# Patient Record
Sex: FEMALE | Race: WHITE | Employment: OTHER | URBAN - METROPOLITAN AREA
[De-identification: names, ages, dates, MRNs, and addresses within clinical notes are randomized per-mention and may not be internally consistent; named-entity substitution may affect disease eponyms.]

---

## 2017-01-22 PROBLEM — M79.672 PAIN IN LEFT FOOT: Status: ACTIVE | Noted: 2017-01-22

## 2018-02-17 ENCOUNTER — OFFICE VISIT (OUTPATIENT)
Dept: INTERNAL MEDICINE CLINIC | Facility: CLINIC | Age: 63
End: 2018-02-17

## 2018-02-17 ENCOUNTER — HOSPITAL ENCOUNTER (OUTPATIENT)
Dept: GENERAL RADIOLOGY | Age: 63
Discharge: HOME | End: 2018-02-17
Attending: INTERNAL MEDICINE | Admitting: INTERNAL MEDICINE
Payer: MEDICARE

## 2018-02-17 ENCOUNTER — HOSPITAL ENCOUNTER (OUTPATIENT)
Dept: GENERAL RADIOLOGY | Age: 63
Discharge: HOME | End: 2018-02-17
Attending: INTERNAL MEDICINE
Payer: MEDICARE

## 2018-02-17 VITALS
BODY MASS INDEX: 35 KG/M2 | WEIGHT: 200 LBS | SYSTOLIC BLOOD PRESSURE: 125 MMHG | RESPIRATION RATE: 18 BRPM | DIASTOLIC BLOOD PRESSURE: 86 MMHG | HEART RATE: 69 BPM

## 2018-02-17 DIAGNOSIS — G25.0 TREMOR, ESSENTIAL: ICD-10-CM

## 2018-02-17 DIAGNOSIS — N64.4 MASTODYNIA OF LEFT BREAST: ICD-10-CM

## 2018-02-17 DIAGNOSIS — M79.645 THUMB PAIN, LEFT: ICD-10-CM

## 2018-02-17 DIAGNOSIS — R05.9 COUGH: ICD-10-CM

## 2018-02-17 DIAGNOSIS — R53.83 OTHER FATIGUE: ICD-10-CM

## 2018-02-17 DIAGNOSIS — N63.0 BREAST NODULE: ICD-10-CM

## 2018-02-17 DIAGNOSIS — R05.9 COUGH: Primary | ICD-10-CM

## 2018-02-17 PROBLEM — F33.41 RECURRENT MAJOR DEPRESSIVE DISORDER, IN PARTIAL REMISSION (HCC): Status: ACTIVE | Noted: 2018-02-17

## 2018-02-17 PROBLEM — M79.672 PAIN IN LEFT FOOT: Status: RESOLVED | Noted: 2017-01-22 | Resolved: 2018-02-17

## 2018-02-17 PROBLEM — G44.229 CHRONIC TENSION-TYPE HEADACHE, NOT INTRACTABLE: Status: ACTIVE | Noted: 2018-02-17

## 2018-02-17 PROBLEM — F33.41 RECURRENT MAJOR DEPRESSIVE DISORDER, IN PARTIAL REMISSION: Status: ACTIVE | Noted: 2018-02-17

## 2018-02-17 PROCEDURE — 99204 OFFICE O/P NEW MOD 45 MIN: CPT | Performed by: INTERNAL MEDICINE

## 2018-02-17 PROCEDURE — 73140 X-RAY EXAM OF FINGER(S): CPT | Performed by: INTERNAL MEDICINE

## 2018-02-17 PROCEDURE — 71046 X-RAY EXAM CHEST 2 VIEWS: CPT | Performed by: INTERNAL MEDICINE

## 2018-02-17 PROCEDURE — G0463 HOSPITAL OUTPT CLINIC VISIT: HCPCS | Performed by: INTERNAL MEDICINE

## 2018-02-17 RX ORDER — IBUPROFEN 200 MG
200 TABLET ORAL EVERY 6 HOURS PRN
COMMUNITY

## 2018-02-17 RX ORDER — CYCLOBENZAPRINE HCL 10 MG
10 TABLET ORAL 3 TIMES DAILY PRN
Qty: 90 TABLET | Status: SHIPPED | OUTPATIENT
Start: 2018-02-17 | End: 2021-01-25

## 2018-02-17 NOTE — PROGRESS NOTES
HPI:    Patient ID: Kristi Santillan is a 65year old female presents to address several concerns.     HPI  Patient reports that last 4 months she has been coughing, occasionally phlegmonous productive with clear sputum, cough can present as a coughing spells sores, sore throat, tinnitus, trouble swallowing and voice change. Psychiatric/Behavioral: Negative for sleep disturbance and depressed mood. The patient is not nervous/anxious.          Current Outpatient Prescriptions:  ibuprofen 200 MG Oral Tab Take 2 table). No cranial nerve deficit or motor deficit.  Gait normal.              ASSESSMENT/PLAN:   Cough  (primary encounter diagnosis) etiology not clear, will get chest x-ray, possible postnasal drainage due to allergies, advised patient to start Xyzal 5 mg

## 2018-02-19 ENCOUNTER — TELEPHONE (OUTPATIENT)
Dept: INTERNAL MEDICINE CLINIC | Facility: CLINIC | Age: 63
End: 2018-02-19

## 2018-02-19 NOTE — TELEPHONE ENCOUNTER
Per Kaci schafer is there and they would like a copy of her blood request faxed to them at [phone].   Per Casimiro Norris will fax request.

## 2018-02-20 ENCOUNTER — HOSPITAL ENCOUNTER (OUTPATIENT)
Dept: MAMMOGRAPHY | Facility: HOSPITAL | Age: 63
Discharge: HOME | End: 2018-02-20
Attending: INTERNAL MEDICINE
Payer: MEDICARE

## 2018-02-20 ENCOUNTER — HOSPITAL ENCOUNTER (OUTPATIENT)
Dept: ULTRASOUND IMAGING | Facility: HOSPITAL | Age: 63
Discharge: HOME | End: 2018-02-20
Attending: INTERNAL MEDICINE
Payer: MEDICARE

## 2018-02-20 DIAGNOSIS — N64.4 MASTODYNIA OF LEFT BREAST: ICD-10-CM

## 2018-02-20 DIAGNOSIS — N63.0 BREAST NODULE: ICD-10-CM

## 2018-02-20 LAB
ABSOLUTE BASOPHILS: 41 CELLS/UL (ref 0–200)
ABSOLUTE EOSINOPHILS: 180 CELLS/UL (ref 15–500)
ABSOLUTE LYMPHOCYTES: 2593 CELLS/UL (ref 850–3900)
ABSOLUTE MONOCYTES: 499 CELLS/UL (ref 200–950)
ABSOLUTE NEUTROPHILS: 2488 CELLS/UL (ref 1500–7800)
ALBUMIN/GLOBULIN RATIO: 1.6 (CALC) (ref 1–2.5)
ALBUMIN: 4.3 G/DL (ref 3.6–5.1)
ALKALINE PHOSPHATASE: 60 U/L (ref 33–130)
ALT: 22 U/L (ref 6–29)
AST: 19 U/L (ref 10–35)
BASOPHILS: 0.7 %
BILIRUBIN, TOTAL: 0.5 MG/DL (ref 0.2–1.2)
BUN: 17 MG/DL (ref 7–25)
CALCIUM: 9.8 MG/DL (ref 8.6–10.4)
CARBON DIOXIDE: 29 MMOL/L (ref 20–31)
CHLORIDE: 104 MMOL/L (ref 98–110)
CREATININE: 0.85 MG/DL (ref 0.5–0.99)
EGFR IF AFRICN AM: 86 ML/MIN/1.73M2
EGFR IF NONAFRICN AM: 74 ML/MIN/1.73M2
EOSINOPHILS: 3.1 %
GLOBULIN: 2.7 G/DL (CALC) (ref 1.9–3.7)
GLUCOSE: 88 MG/DL (ref 65–99)
HEMATOCRIT: 40.2 % (ref 35–45)
HEMOGLOBIN: 13.3 G/DL (ref 11.7–15.5)
LYMPHOCYTES: 44.7 %
MCH: 28.3 PG (ref 27–33)
MCHC: 33.1 G/DL (ref 32–36)
MCV: 85.5 FL (ref 80–100)
MONOCYTES: 8.6 %
MPV: 10.2 FL (ref 7.5–12.5)
NEUTROPHILS: 42.9 %
PLATELET COUNT: 310 THOUSAND/UL (ref 140–400)
POTASSIUM: 4.8 MMOL/L (ref 3.5–5.3)
PROTEIN, TOTAL: 7 G/DL (ref 6.1–8.1)
RDW: 12.5 % (ref 11–15)
RED BLOOD CELL COUNT: 4.7 MILLION/UL (ref 3.8–5.1)
SODIUM: 140 MMOL/L (ref 135–146)
TSH W/REFLEX TO FT4: 2.29 MIU/L (ref 0.4–4.5)
WHITE BLOOD CELL COUNT: 5.8 THOUSAND/UL (ref 3.8–10.8)

## 2018-02-20 PROCEDURE — 77066 DX MAMMO INCL CAD BI: CPT | Performed by: INTERNAL MEDICINE

## 2018-02-20 PROCEDURE — 76642 ULTRASOUND BREAST LIMITED: CPT | Performed by: INTERNAL MEDICINE

## 2018-02-21 ENCOUNTER — TELEPHONE (OUTPATIENT)
Dept: FAMILY MEDICINE CLINIC | Facility: CLINIC | Age: 63
End: 2018-02-21

## 2018-02-21 NOTE — TELEPHONE ENCOUNTER
Notes Recorded by Salvatore Grace MA on 2/21/2018 at 1:24 PM CST  Left detailed vm notifying patient of normal X-rays results.

## 2018-02-21 NOTE — TELEPHONE ENCOUNTER
----- Message from Mckenzie Truong MD sent at 2/18/2018 10:33 PM CST -----  Call patient negative chest x-ray, negative chest x-ray of the finger

## 2018-02-21 NOTE — TELEPHONE ENCOUNTER
Pt called back regarding results and advised of normal for both chest x-ray and x-ray of finger. Pt reports no improvement in her condition, no worse either. She reports continued cough, chest wall pain, and left thumb pain.  No increased, changed, new,

## 2018-03-01 ENCOUNTER — OFFICE VISIT (OUTPATIENT)
Dept: DERMATOLOGY CLINIC | Facility: CLINIC | Age: 63
End: 2018-03-01

## 2018-03-01 DIAGNOSIS — D23.70 BENIGN NEOPLASM OF SKIN OF LOWER LIMB, INCLUDING HIP, UNSPECIFIED LATERALITY: ICD-10-CM

## 2018-03-01 DIAGNOSIS — D23.5 BENIGN NEOPLASM OF SKIN OF TRUNK, EXCEPT SCROTUM: ICD-10-CM

## 2018-03-01 DIAGNOSIS — L81.4 LENTIGO: ICD-10-CM

## 2018-03-01 DIAGNOSIS — L82.1 SEBORRHEIC KERATOSES: Primary | ICD-10-CM

## 2018-03-01 DIAGNOSIS — D23.30 BENIGN NEOPLASM OF SKIN OF FACE: ICD-10-CM

## 2018-03-01 DIAGNOSIS — D23.60 BENIGN NEOPLASM OF SKIN OF UPPER LIMB, INCLUDING SHOULDER, UNSPECIFIED LATERALITY: ICD-10-CM

## 2018-03-01 DIAGNOSIS — D23.4 BENIGN NEOPLASM OF SCALP AND SKIN OF NECK: ICD-10-CM

## 2018-03-01 PROCEDURE — G0463 HOSPITAL OUTPT CLINIC VISIT: HCPCS | Performed by: DERMATOLOGY

## 2018-03-01 PROCEDURE — 99202 OFFICE O/P NEW SF 15 MIN: CPT | Performed by: DERMATOLOGY

## 2018-03-12 NOTE — PROGRESS NOTES
Matt Vera is a 65year old female. HPI:     CC:  Patient presents with:  Lesion: New patient presents with lesions to outer labia x 10+ years. Waxes and wanes. More lesions appearing. Aggraved when washing and bleed at times.    Derm Problem: \"Moles\ Comment:Other reaction(s): IODINE  Penicillins               Sulfa Antibiotics         Tylenol [Acetaminop*        Past Medical History:   Diagnosis Date   • Bronchitis    • Colon abnormality     spastic   • Depression    • Tremor    • Ulcer    • VHL (von including scalp, head, neck, face,nails, hair, external eyes, including conjunctival mucosa, eyelids, lips external ears, back, chest,/ breasts, axillae,  abdomen, arms, legs, palms.      Multiple light to medium brown, well marginated, uniformly pigmented, nevi, seborrheic  keratoses, cherry angiomas:  Reassurance regarding other benign skin lesions. Signs and symptoms of skin cancer, ABCDE's of melanoma discussed with patient. Sunscreen use, sun protection, self exams reviewed.   Followup as noted RTC routine

## 2019-01-22 ENCOUNTER — NURSE TRIAGE (OUTPATIENT)
Dept: OTHER | Age: 64
End: 2019-01-22

## 2019-01-22 ENCOUNTER — OFFICE VISIT (OUTPATIENT)
Dept: INTERNAL MEDICINE CLINIC | Facility: CLINIC | Age: 64
End: 2019-01-22
Payer: MEDICARE

## 2019-01-22 VITALS
SYSTOLIC BLOOD PRESSURE: 129 MMHG | BODY MASS INDEX: 34 KG/M2 | HEART RATE: 103 BPM | DIASTOLIC BLOOD PRESSURE: 81 MMHG | TEMPERATURE: 99 F | OXYGEN SATURATION: 96 % | WEIGHT: 196 LBS | RESPIRATION RATE: 25 BRPM

## 2019-01-22 DIAGNOSIS — J40 BRONCHITIS: Primary | ICD-10-CM

## 2019-01-22 PROCEDURE — 99214 OFFICE O/P EST MOD 30 MIN: CPT | Performed by: INTERNAL MEDICINE

## 2019-01-22 PROCEDURE — G0463 HOSPITAL OUTPT CLINIC VISIT: HCPCS | Performed by: INTERNAL MEDICINE

## 2019-01-22 RX ORDER — DOXYCYCLINE HYCLATE 100 MG
100 TABLET ORAL 2 TIMES DAILY
Qty: 20 TABLET | Refills: 0 | Status: SHIPPED | OUTPATIENT
Start: 2019-01-22 | End: 2019-11-08

## 2019-01-22 RX ORDER — PROPRANOLOL HYDROCHLORIDE 10 MG/1
10 TABLET ORAL 3 TIMES DAILY
Qty: 90 TABLET | Refills: 1 | Status: SHIPPED | OUTPATIENT
Start: 2019-01-22 | End: 2019-10-16

## 2019-01-22 NOTE — TELEPHONE ENCOUNTER
Action Requested: Summary for Provider     []  Critical Lab, Recommendations Needed  [] Need Additional Advice  []   FYI    []   Need Orders  [] Need Medications Sent to Pharmacy  []  Other     SUMMARY: pt c/o cough x 2 weeks. Sputum clear.  Associate with

## 2019-01-23 NOTE — PROGRESS NOTES
HPI:    Patient ID: Ally Waldrop is a 59year old female.   Presents for evaluation of the cough    HPI  Patient reports that she started having cough and chest congestion about 2 weeks ago, initially had nasal congestion and postnasal drainage, now every cetirizine 10 MG Oral Tab Take 10 mg by mouth daily.  Disp:  Rfl:      Allergies:  Erythromycin            ANGIOEDEMA  Acetaminophen               Comment:Other reaction(s): ACETAMINOPHEN  Adhesive Tape             Gentamicin                  Comment:Othe Oral Tab 20 tablet 0     Sig: Take 1 tablet (100 mg total) by mouth 2 (two) times daily. • Propranolol HCl 10 MG Oral Tab 90 tablet 1     Sig: Take 1 tablet (10 mg total) by mouth 3 (three) times daily.        Imaging & Referrals:  None         [id number]

## 2019-10-15 NOTE — TELEPHONE ENCOUNTER
Patient is requesting a refill of medication Propranolol HCl 10 MG Oral Tab.  Patient states she contacted [facility] regarding her refill request and Pharmacy advised they sent refill request to Neurologist at [facility] when patient states request renetta

## 2019-10-16 RX ORDER — PROPRANOLOL HYDROCHLORIDE 10 MG/1
10 TABLET ORAL 3 TIMES DAILY
Qty: 90 TABLET | Refills: 0 | Status: SHIPPED | OUTPATIENT
Start: 2019-10-16 | End: 2019-11-12

## 2019-10-16 NOTE — TELEPHONE ENCOUNTER
Please advise. rx failed protocol.  Pt need appt and labs due  Hypertensive Medications  Protocol Criteria:  · Appointment scheduled in the past 6 months or in the next 3 months  · BMP or CMP in the past 12 months  · Creatinine result < 2  Recent Outpatient

## 2019-11-08 ENCOUNTER — TELEPHONE (OUTPATIENT)
Dept: INTERNAL MEDICINE CLINIC | Facility: CLINIC | Age: 64
End: 2019-11-08

## 2019-11-08 ENCOUNTER — HOSPITAL ENCOUNTER (OUTPATIENT)
Dept: GENERAL RADIOLOGY | Age: 64
Discharge: HOME | End: 2019-11-08
Attending: INTERNAL MEDICINE
Payer: MEDICARE

## 2019-11-08 ENCOUNTER — NURSE TRIAGE (OUTPATIENT)
Dept: OTHER | Age: 64
End: 2019-11-08

## 2019-11-08 ENCOUNTER — OFFICE VISIT (OUTPATIENT)
Dept: INTERNAL MEDICINE CLINIC | Facility: CLINIC | Age: 64
End: 2019-11-08
Payer: MEDICARE

## 2019-11-08 VITALS
HEART RATE: 103 BPM | BODY MASS INDEX: 36.05 KG/M2 | SYSTOLIC BLOOD PRESSURE: 118 MMHG | HEIGHT: 63.5 IN | DIASTOLIC BLOOD PRESSURE: 82 MMHG | WEIGHT: 206 LBS | RESPIRATION RATE: 25 BRPM

## 2019-11-08 DIAGNOSIS — M25.552 PAIN OF LEFT HIP JOINT: Primary | ICD-10-CM

## 2019-11-08 DIAGNOSIS — M25.552 PAIN OF LEFT HIP JOINT: ICD-10-CM

## 2019-11-08 DIAGNOSIS — G25.0 TREMOR, ESSENTIAL: ICD-10-CM

## 2019-11-08 PROCEDURE — 73502 X-RAY EXAM HIP UNI 2-3 VIEWS: CPT | Performed by: INTERNAL MEDICINE

## 2019-11-08 PROCEDURE — G0463 HOSPITAL OUTPT CLINIC VISIT: HCPCS | Performed by: INTERNAL MEDICINE

## 2019-11-08 PROCEDURE — 99214 OFFICE O/P EST MOD 30 MIN: CPT | Performed by: INTERNAL MEDICINE

## 2019-11-08 NOTE — TELEPHONE ENCOUNTER
Action Requested: Summary for Provider     []  Critical Lab, Recommendations Needed  [] Need Additional Advice  [x]   FYI    []   Need Orders  [] Need Medications Sent to Pharmacy  []  Other     SUMMARY: Patient calling with complaint of falling today and

## 2019-11-09 NOTE — PROGRESS NOTES
HPI:    Patient ID: Ally Waldrop is a 62year old female.   Presents for evaluation of the injuries    HPI  Patient reports that today when she was walking out of the pool she usually walks with a small steps because of Parkinson's disease, today decided 10 MG Oral Tab Take 1 tablet (10 mg total) by mouth 3 (three) times daily as needed for Muscle spasms. 90 tablet q   • Calcium Carbonate Antacid 500 MG Oral Chew Tab Chew 1 tablet by mouth daily. • cetirizine 10 MG Oral Tab Take 10 mg by mouth daily. bedtime careful walking and driving, if symptoms persist will need MRI of the hip orthopedic eval etc.  Tremor continue propranolol per patient medication is very helpful,  Involuntary movements, advised patient to revisit with neurology to be reevaluated

## 2019-11-14 RX ORDER — PROPRANOLOL HYDROCHLORIDE 10 MG/1
TABLET ORAL
Qty: 90 TABLET | Refills: 2 | Status: SHIPPED | OUTPATIENT
Start: 2019-11-14 | End: 2020-02-20

## 2019-11-14 NOTE — TELEPHONE ENCOUNTER
Please review; protocol failed. Overdue for labs    Recent Visits  Date Type Provider Dept   11/08/19 Office Visit Jamshid Wells MD [facility]   01/22/19 Office Visit Jamshid Wells MD [facility]   Showing recent visits within past 540 days with a meds authorizing provider and meeting all other requirements     Future Appointments  No visits were found meeting these conditions.    Showing future appointments within next 150 days with a meds authorizing provider and meeting all other requirements

## 2020-02-20 RX ORDER — PROPRANOLOL HYDROCHLORIDE 10 MG/1
TABLET ORAL
Qty: 270 TABLET | Refills: 1 | Status: SHIPPED | OUTPATIENT
Start: 2020-02-20 | End: 2020-06-24

## 2020-02-20 NOTE — TELEPHONE ENCOUNTER
Requested Prescriptions     Pending Prescriptions Disp Refills   • PROPRANOLOL HCL 10 MG Oral Tab [Pharmacy Med Name: PROPRANOLOL 10 MG TABLET] 270 tablet 1     Sig: TAKE 1 TABLET BY MOUTH THREE TIMES A DAY         Recent Visits  Date Type Provider Dept

## 2020-03-18 ENCOUNTER — TELEPHONE (OUTPATIENT)
Dept: INTERNAL MEDICINE CLINIC | Facility: CLINIC | Age: 65
End: 2020-03-18

## 2020-03-18 NOTE — TELEPHONE ENCOUNTER
Patient states she missed call from Dr. Lupis Terrazas, she said she received her voice mail. Patient states she called insurance and she was told she needed a referral by her primary doctor in order to be covered.

## 2020-03-18 NOTE — TELEPHONE ENCOUNTER
Left message for the patient that she should schedule appointment [phone] [facility] specialist does not matter what clinic.,  If optometrist referral center it should be sufficient

## 2020-03-18 NOTE — TELEPHONE ENCOUNTER
Pt calling as [facility] went to have regular eye exam on 3/11/20 since wears glasses and had noticed a black spot on left eye starting on 3/6/20 (mentions was hit by a volley ball on the face at a health club and black spot started hours later).  [facility] has ha

## 2020-03-25 NOTE — TELEPHONE ENCOUNTER
Spoke to patient 6 days ago, advised her that she should provide new insurance information, and referred her to speak to manage care, clarify need for referral and insurance coverage, placed referral for her to see Dr. Tawanda Winn, she states that her conditio

## 2020-06-18 ENCOUNTER — NURSE TRIAGE (OUTPATIENT)
Dept: INTERNAL MEDICINE CLINIC | Facility: CLINIC | Age: 65
End: 2020-06-18

## 2020-06-18 NOTE — TELEPHONE ENCOUNTER
Action Requested: Summary for Provider     []  Critical Lab, Recommendations Needed  [] Need Additional Advice  [x]   FYI    []   Need Orders  [] Need Medications Sent to Pharmacy  []  Other     SUMMARY: per patient Haylee Armendariz started having problems with speec

## 2020-06-24 RX ORDER — PROPRANOLOL HYDROCHLORIDE 10 MG/1
10 TABLET ORAL 3 TIMES DAILY
Qty: 270 TABLET | Refills: 1 | Status: SHIPPED | OUTPATIENT
Start: 2020-06-24 | End: 2021-02-28

## 2020-06-24 NOTE — TELEPHONE ENCOUNTER
Spoke with pt who states \"I am fine\"  Pt denies any further slurred speech, able to ambulate without difficulty,     Per pt \"I think I was dehydrated and tired.   I drank plenty of fluids and took a nap and then felt fine\"    Denies chest pain, difficul

## 2020-09-11 ENCOUNTER — TELEPHONE (OUTPATIENT)
Dept: CASE MANAGEMENT | Age: 65
End: 2020-09-11

## 2020-09-11 NOTE — TELEPHONE ENCOUNTER
Patient is eligible for a 2020 Medicare Annual Wellness visit. Discussed in detail w/patient. Appt scheduled 10/13/20.

## 2020-12-16 ENCOUNTER — TELEPHONE (OUTPATIENT)
Dept: INTERNAL MEDICINE CLINIC | Facility: CLINIC | Age: 65
End: 2020-12-16

## 2021-01-19 ENCOUNTER — OFFICE VISIT (OUTPATIENT)
Dept: INTERNAL MEDICINE CLINIC | Facility: CLINIC | Age: 66
End: 2021-01-19
Payer: MEDICARE

## 2021-01-19 ENCOUNTER — HOSPITAL ENCOUNTER (OUTPATIENT)
Dept: GENERAL RADIOLOGY | Age: 66
Discharge: HOME | End: 2021-01-19
Attending: INTERNAL MEDICINE
Payer: MEDICARE

## 2021-01-19 ENCOUNTER — NURSE TRIAGE (OUTPATIENT)
Dept: INTERNAL MEDICINE CLINIC | Facility: CLINIC | Age: 66
End: 2021-01-19

## 2021-01-19 VITALS
DIASTOLIC BLOOD PRESSURE: 83 MMHG | HEART RATE: 77 BPM | OXYGEN SATURATION: 97 % | WEIGHT: 200 LBS | HEIGHT: 63.5 IN | SYSTOLIC BLOOD PRESSURE: 128 MMHG | BODY MASS INDEX: 35 KG/M2

## 2021-01-19 DIAGNOSIS — R07.81 RIB PAIN ON LEFT SIDE: ICD-10-CM
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
## 2021-01-19 DIAGNOSIS — R07.81 RIB PAIN ON LEFT SIDE: Primary | ICD-10-CM

## 2021-01-19 DIAGNOSIS — R07.81 RIB PAIN: ICD-10-CM

## 2021-01-19 PROCEDURE — 3008F BODY MASS INDEX DOCD: CPT | Performed by: INTERNAL MEDICINE

## 2021-01-19 PROCEDURE — 3074F SYST BP LT 130 MM HG: CPT | Performed by: INTERNAL MEDICINE

## 2021-01-19 PROCEDURE — 3079F DIAST BP 80-89 MM HG: CPT | Performed by: INTERNAL MEDICINE

## 2021-01-19 PROCEDURE — 71111 X-RAY EXAM RIBS/CHEST4/> VWS: CPT | Performed by: INTERNAL MEDICINE

## 2021-01-19 PROCEDURE — 99213 OFFICE O/P EST LOW 20 MIN: CPT | Performed by: INTERNAL MEDICINE

## 2021-01-19 NOTE — PROGRESS NOTES
HPI:    Patient ID: Rl Hassan is a 60year old female. Patient presents with:  Fall: Stts she was walking her dog this morning and fell on her left side of the body.   Pain is located on her left rib cage, and chest.  Pain is noticed when leaning fo Gastrointestinal: Negative for abdominal pain, bowel incontinence, constipation, diarrhea, nausea and vomiting. Genitourinary: Negative for dysuria and frequency. Musculoskeletal: Negative for back pain, neck pain and neck stiffness.         Left side r Pulmonary/Chest: Effort normal and breath sounds normal. No respiratory distress. She has no decreased breath sounds. She has no wheezes. She has no rhonchi. She has no rales.                Left  Lower  Rib cage tenderness    posterior  Lower Ribs  Tendern Likely is a rib contusion , possible fracture-to evaluate with a chest x-ray first and then we can order further testing if necessary based on her symptoms and x-ray findings.      Patient is going downstairs to have the x-ray completed  She will have jurgen

## 2021-01-19 NOTE — TELEPHONE ENCOUNTER
Action Requested: Summary for Provider     []  Critical Lab, Recommendations Needed  [] Need Additional Advice  []   FYI    []   Need Orders  [] Need Medications Sent to Pharmacy  []  Other     SUMMARY:   Appointment made for today  1/19/21    The patien

## 2021-01-22 ENCOUNTER — TELEPHONE (OUTPATIENT)
Dept: INTERNAL MEDICINE CLINIC | Facility: CLINIC | Age: 66
End: 2021-01-22

## 2021-01-23 NOTE — TELEPHONE ENCOUNTER
To patient, she is in tremendous amount of pain, lifting left arm, leaning forward creates a lot of pain deep in the chest.  Advised that I can place binder order from my chart she is not on my chart, I advised her to create improvised binder at home, can

## 2021-01-23 NOTE — TELEPHONE ENCOUNTER
Called patient. LOV 1/19/21. Patient requested to speak with Dr. Jaziel Jessica. I scheduled patient 1/25/21.  2:40pm.    Patient states there is no improvement. Xray showed no fracture.   Patient feels she has more internal tissue tear (ligament/tendon?)   S

## 2021-01-23 NOTE — TELEPHONE ENCOUNTER
Patient is following up. Patient is requesting a call back today. Patient stated she still in pain. Please advise.

## 2021-01-25 ENCOUNTER — HOSPITAL ENCOUNTER (OUTPATIENT)
Dept: CT IMAGING | Age: 66
Discharge: HOME | End: 2021-01-25
Attending: INTERNAL MEDICINE
Payer: MEDICARE

## 2021-01-25 ENCOUNTER — TELEPHONE (OUTPATIENT)
Dept: INTERNAL MEDICINE CLINIC | Facility: CLINIC | Age: 66
End: 2021-01-25

## 2021-01-25 ENCOUNTER — OFFICE VISIT (OUTPATIENT)
Dept: INTERNAL MEDICINE CLINIC | Facility: CLINIC | Age: 66
End: 2021-01-25
Payer: MEDICARE

## 2021-01-25 VITALS
SYSTOLIC BLOOD PRESSURE: 110 MMHG | DIASTOLIC BLOOD PRESSURE: 77 MMHG | BODY MASS INDEX: 35 KG/M2 | RESPIRATION RATE: 20 BRPM | WEIGHT: 200 LBS | HEART RATE: 78 BPM

## 2021-01-25 DIAGNOSIS — R07.81 PLEURITIC CHEST PAIN: ICD-10-CM

## 2021-01-25 DIAGNOSIS — S20.212S: ICD-10-CM

## 2021-01-25 DIAGNOSIS — R07.81 PLEURITIC CHEST PAIN: Primary | ICD-10-CM

## 2021-01-25 PROCEDURE — 3078F DIAST BP <80 MM HG: CPT | Performed by: INTERNAL MEDICINE

## 2021-01-25 PROCEDURE — 71250 CT THORAX DX C-: CPT | Performed by: INTERNAL MEDICINE

## 2021-01-25 PROCEDURE — 99214 OFFICE O/P EST MOD 30 MIN: CPT | Performed by: INTERNAL MEDICINE

## 2021-01-25 PROCEDURE — 3074F SYST BP LT 130 MM HG: CPT | Performed by: INTERNAL MEDICINE

## 2021-01-25 RX ORDER — IBUPROFEN 600 MG/1
600 TABLET ORAL EVERY 8 HOURS PRN
Qty: 60 TABLET | Refills: 0 | Status: SHIPPED | OUTPATIENT
Start: 2021-01-25

## 2021-01-25 RX ORDER — OMEPRAZOLE 20 MG/1
20 CAPSULE, DELAYED RELEASE ORAL
Qty: 90 CAPSULE | Refills: 0 | Status: SHIPPED | OUTPATIENT
Start: 2021-01-25 | End: 2021-04-18

## 2021-01-25 RX ORDER — CYCLOBENZAPRINE HCL 10 MG
10 TABLET ORAL 3 TIMES DAILY PRN
Qty: 90 TABLET | Refills: 0 | Status: SHIPPED | OUTPATIENT
Start: 2021-01-25

## 2021-01-26 NOTE — PROGRESS NOTES
HPI:    Patient ID: Varun Acosta is a 60year old female.   Presents for follow-up pulmonary pain    HPI  Patient fell 8 days ago walking her dog, develop severe pain in the left lower ribs, when she fell she feels like she elbowed herself in the rib cage ACETAMINOPHEN  Adhesive Tape             Gentamicin                  Comment:Other reaction(s): GENTAMICIN  Iodine (Topical)            Comment:Other reaction(s): IODINE  Penicillins               Sulfa Antibiotics         Tylenol [Acetaminop*        Prescriptions Disp Refills   • ibuprofen 600 MG Oral Tab 60 tablet 0     Sig: Take 1 tablet (600 mg total) by mouth every 8 (eight) hours as needed for Pain.    • omeprazole 20 MG Oral Capsule Delayed Release 90 capsule 0     Sig: Take 1 capsule (20 mg tota

## 2021-02-28 RX ORDER — PROPRANOLOL HYDROCHLORIDE 10 MG/1
TABLET ORAL
Qty: 270 TABLET | Refills: 1 | Status: SHIPPED | OUTPATIENT
Start: 2021-02-28 | End: 2021-08-10

## 2021-03-23 ENCOUNTER — TELEPHONE (OUTPATIENT)
Dept: CASE MANAGEMENT | Age: 66
End: 2021-03-23

## 2021-03-23 NOTE — TELEPHONE ENCOUNTER
Patient is eligible for a 2021 Medicare Annual Wellness visit. Left message to call back [phone].

## 2021-04-18 RX ORDER — OMEPRAZOLE 20 MG/1
CAPSULE, DELAYED RELEASE ORAL
Qty: 90 CAPSULE | Refills: 0 | Status: SHIPPED | OUTPATIENT
Start: 2021-04-18 | End: 2021-07-12

## 2021-04-21 ENCOUNTER — OFFICE VISIT (OUTPATIENT)
Dept: INTERNAL MEDICINE CLINIC | Facility: CLINIC | Age: 66
End: 2021-04-21
Payer: MEDICARE

## 2021-04-21 VITALS
RESPIRATION RATE: 17 BRPM | DIASTOLIC BLOOD PRESSURE: 74 MMHG | WEIGHT: 200 LBS | BODY MASS INDEX: 35 KG/M2 | HEART RATE: 69 BPM | SYSTOLIC BLOOD PRESSURE: 110 MMHG | HEIGHT: 63.5 IN

## 2021-04-21 DIAGNOSIS — Z12.31 BREAST CANCER SCREENING BY MAMMOGRAM: ICD-10-CM

## 2021-04-21 DIAGNOSIS — R25.1 TREMOR OBSERVED ON EXAMINATION: ICD-10-CM

## 2021-04-21 DIAGNOSIS — F33.41 RECURRENT MAJOR DEPRESSIVE DISORDER, IN PARTIAL REMISSION (HCC): ICD-10-CM

## 2021-04-21 DIAGNOSIS — G44.229 CHRONIC TENSION-TYPE HEADACHE, NOT INTRACTABLE: ICD-10-CM

## 2021-04-21 DIAGNOSIS — Z00.00 PHYSICAL EXAM, ANNUAL: Primary | ICD-10-CM

## 2021-04-21 DIAGNOSIS — M17.11 PRIMARY OSTEOARTHRITIS OF RIGHT KNEE: ICD-10-CM

## 2021-04-21 DIAGNOSIS — R27.0 ATAXIA: ICD-10-CM

## 2021-04-21 DIAGNOSIS — Z91.81 AT RISK FOR FALLS: ICD-10-CM

## 2021-04-21 PROCEDURE — G0439 PPPS, SUBSEQ VISIT: HCPCS | Performed by: INTERNAL MEDICINE

## 2021-04-21 PROCEDURE — 99396 PREV VISIT EST AGE 40-64: CPT | Performed by: INTERNAL MEDICINE

## 2021-04-21 PROCEDURE — 3074F SYST BP LT 130 MM HG: CPT | Performed by: INTERNAL MEDICINE

## 2021-04-21 PROCEDURE — 96160 PT-FOCUSED HLTH RISK ASSMT: CPT | Performed by: INTERNAL MEDICINE

## 2021-04-21 PROCEDURE — 3008F BODY MASS INDEX DOCD: CPT | Performed by: INTERNAL MEDICINE

## 2021-04-21 PROCEDURE — 3078F DIAST BP <80 MM HG: CPT | Performed by: INTERNAL MEDICINE

## 2021-04-21 NOTE — PROGRESS NOTES
HPI:   Lord West is a 60year old female who presents for a Medicare Subsequent Annual Wellness visit (Pt already had Initial Annual Wellness). Left rib cage pain is not as severe still did not resolve completely since 2 months ago.   Chronic cough, cognitive assessment- see flowsheet entries    Functional Ability/Status   [address] has some abnormal functions as listed below:  She has difficulties Managing Money/Bills based on screening of functional status.    Managing money/bills: Need some hel requested submit copy of document when it is available     She does NOT have a Power of  for [employer] on file in [address] [place] [address] Rd.    Discussed with patient meaning of power of  for healthcare, provided with educational brochure and forms, requested spasms. ibuprofen 200 MG Oral Tab, Take 200 mg by mouth every 6 (six) hours as needed. Calcium Carbonate Antacid 500 MG Oral Chew Tab, Chew 1 tablet by mouth as needed. cetirizine 10 MG Oral Tab, Take 10 mg by mouth as needed.          MEDICAL INFORMAT calculated from the following:    Height as of this encounter: 5' 3.5\" (1.613 m). Weight as of this encounter: 200 lb (90.7 kg).     Medicare Hearing Assessment  (Required for AWV/SWV)    Hearing Screening    Time taken: 4/21/2021  1:32 PM  Entry User: intact. Conjunctiva/sclera: Conjunctivae normal.      Pupils: Pupils are equal, round, and reactive to light. Cardiovascular:      Rate and Rhythm: Normal rate and regular rhythm. Heart sounds: No murmur heard. No gallop.     Pulmonary:      E neurologist for failure evaluation of the problem  -     NEURO - INTERNAL    At risk for falls, advised patient to use cane or walker on the days when she feels unsteady to prevent  falls  Recurrent major depressive disorder, in partial remission/HCC/patie Fecal Occult Blood Annually No results found for: FOB No flowsheet data found. Glaucoma Screening      Ophthalmology Visit Annually: Diabetics, FHx Glaucoma, AA>50, > 65 No flowsheet data found.     Bone Density Screening      Dexascan Every

## 2021-05-04 ENCOUNTER — HOSPITAL ENCOUNTER (OUTPATIENT)
Dept: MAMMOGRAPHY | Age: 66
Discharge: HOME | End: 2021-05-04
Attending: INTERNAL MEDICINE
Payer: MEDICARE

## 2021-05-04 DIAGNOSIS — Z12.31 BREAST CANCER SCREENING BY MAMMOGRAM: ICD-10-CM

## 2021-05-04 DIAGNOSIS — Z00.00 PHYSICAL EXAM, ANNUAL: ICD-10-CM

## 2021-05-04 PROCEDURE — 77067 SCR MAMMO BI INCL CAD: CPT | Performed by: INTERNAL MEDICINE

## 2021-05-04 PROCEDURE — 77063 BREAST TOMOSYNTHESIS BI: CPT | Performed by: INTERNAL MEDICINE

## 2021-06-02 ENCOUNTER — OFFICE VISIT (OUTPATIENT)
Dept: NEUROLOGY | Facility: CLINIC | Age: 66
End: 2021-06-02
Payer: MEDICARE

## 2021-06-02 VITALS
DIASTOLIC BLOOD PRESSURE: 70 MMHG | RESPIRATION RATE: 16 BRPM | HEART RATE: 76 BPM | SYSTOLIC BLOOD PRESSURE: 112 MMHG | WEIGHT: 208 LBS | BODY MASS INDEX: 36 KG/M2

## 2021-06-02 DIAGNOSIS — R25.2 MUSCLE CRAMPS: ICD-10-CM

## 2021-06-02 DIAGNOSIS — R26.9 GAIT DISORDER: ICD-10-CM

## 2021-06-02 DIAGNOSIS — G25.0 BENIGN ESSENTIAL TREMOR SYNDROME: Primary | ICD-10-CM

## 2021-06-02 PROCEDURE — 3078F DIAST BP <80 MM HG: CPT | Performed by: OTHER

## 2021-06-02 PROCEDURE — 99204 OFFICE O/P NEW MOD 45 MIN: CPT | Performed by: OTHER

## 2021-06-02 PROCEDURE — 3074F SYST BP LT 130 MM HG: CPT | Performed by: OTHER

## 2021-06-02 NOTE — PROGRESS NOTES
HPI:    Patient ID: Capo Keyes is a 73year old female.   PCP: Dr Adelita Quinn    HPI   Abyb Young is a 73year old ambidextrous female with history of benign essential tremors and Lennox Bond Lindau syndrome who presents for evaluation of tremors and gait p Eyes: Negative. Respiratory: Negative. Cardiovascular: Negative. Gastrointestinal: Negative. Endocrine: Negative. Genitourinary: Negative. Musculoskeletal: Positive for gait problem. Skin: Negative. Allergic/Immunologic: Negative. motion. Neck supple. Cardiovascular: Normal rate, regular rhythm and normal heart sounds. Pulmonary/Chest: Effort normal and breath sounds normal.   Abdominal: Soft.  Bowel sounds are normal.   Psych: normal mood and affect    Mental Status Exam: Patien Sinemet but patient politely declined  She would like to continue Propranolol at the current dose 10 mg TID    Obtain all old records. Follow up in about 4-6 weeks        Thank you for allowing us to participate in your patient's care. Please do not hesitat

## 2021-06-02 NOTE — PROGRESS NOTES
Patient states she has had tremors since childhood but have gotten progressively worse over the past 18 years. Is taking Propranolol.

## 2021-06-08 ENCOUNTER — TELEPHONE (OUTPATIENT)
Dept: NEUROLOGY | Facility: CLINIC | Age: 66
End: 2021-06-08

## 2021-06-08 ENCOUNTER — HOSPITAL ENCOUNTER (OUTPATIENT)
Dept: MRI IMAGING | Facility: HOSPITAL | Age: 66
Discharge: HOME | End: 2021-06-08
Attending: Other
Payer: MEDICARE

## 2021-06-08 DIAGNOSIS — R26.9 GAIT DISORDER: ICD-10-CM

## 2021-06-08 DIAGNOSIS — G25.0 BENIGN ESSENTIAL TREMOR SYNDROME: ICD-10-CM

## 2021-06-08 DIAGNOSIS — R25.2 MUSCLE CRAMPS: ICD-10-CM

## 2021-06-08 PROCEDURE — A9575 INJ GADOTERATE MEGLUMI 0.1ML: HCPCS | Performed by: OTHER

## 2021-06-08 PROCEDURE — 70553 MRI BRAIN STEM W/O & W/DYE: CPT | Performed by: OTHER

## 2021-06-08 NOTE — TELEPHONE ENCOUNTER
Spoke to patient and notified her of below. She was understanding. She has a follow-up on 8/4/21 and will discuss next steps at that time.

## 2021-06-08 NOTE — TELEPHONE ENCOUNTER
----- Message from Mendel Morin, MD sent at 6/8/2021  4:16 PM CDT -----  Stable MRI brain. No new lesions or stroke noted.

## 2021-06-08 NOTE — TELEPHONE ENCOUNTER
----- Message from Tiffanie Escamilla MD sent at 6/8/2021  4:16 PM CDT -----  Stable MRI brain. No new lesions or stroke noted.

## 2021-07-12 RX ORDER — OMEPRAZOLE 20 MG/1
CAPSULE, DELAYED RELEASE ORAL
Qty: 90 CAPSULE | Refills: 0 | Status: SHIPPED | OUTPATIENT
Start: 2021-07-12 | End: 2021-10-10

## 2021-08-10 ENCOUNTER — OFFICE VISIT (OUTPATIENT)
Dept: NEUROLOGY | Facility: CLINIC | Age: 66
End: 2021-08-10
Payer: MEDICARE

## 2021-08-10 ENCOUNTER — LAB ENCOUNTER (OUTPATIENT)
Dept: LAB | Facility: HOSPITAL | Age: 66
End: 2021-08-10
Attending: Other
Payer: MEDICARE

## 2021-08-10 VITALS
SYSTOLIC BLOOD PRESSURE: 120 MMHG | DIASTOLIC BLOOD PRESSURE: 70 MMHG | HEIGHT: 63.5 IN | BODY MASS INDEX: 36.4 KG/M2 | WEIGHT: 208 LBS | RESPIRATION RATE: 16 BRPM | HEART RATE: 70 BPM

## 2021-08-10 DIAGNOSIS — G25.0 BENIGN ESSENTIAL TREMOR SYNDROME: Primary | ICD-10-CM

## 2021-08-10 DIAGNOSIS — R25.2 MUSCLE CRAMPS: ICD-10-CM

## 2021-08-10 DIAGNOSIS — M54.81 OCCIPITAL NEURALGIA OF LEFT SIDE: ICD-10-CM

## 2021-08-10 DIAGNOSIS — R26.9 GAIT DISORDER: ICD-10-CM

## 2021-08-10 LAB
HAV IGM SER QL: 2.7 MG/DL (ref 1.6–2.6)
POTASSIUM SERPL-SCNC: 3.9 MMOL/L (ref 3.5–5.1)
VIT B12 SERPL-MCNC: 634 PG/ML (ref 193–986)
VIT D+METAB SERPL-MCNC: 37.3 NG/ML (ref 30–100)

## 2021-08-10 PROCEDURE — 82607 VITAMIN B-12: CPT

## 2021-08-10 PROCEDURE — 82306 VITAMIN D 25 HYDROXY: CPT

## 2021-08-10 PROCEDURE — 3074F SYST BP LT 130 MM HG: CPT | Performed by: OTHER

## 2021-08-10 PROCEDURE — 3008F BODY MASS INDEX DOCD: CPT | Performed by: OTHER

## 2021-08-10 PROCEDURE — 36415 COLL VENOUS BLD VENIPUNCTURE: CPT

## 2021-08-10 PROCEDURE — 84132 ASSAY OF SERUM POTASSIUM: CPT

## 2021-08-10 PROCEDURE — 83735 ASSAY OF MAGNESIUM: CPT

## 2021-08-10 PROCEDURE — 99214 OFFICE O/P EST MOD 30 MIN: CPT | Performed by: OTHER

## 2021-08-10 PROCEDURE — 3078F DIAST BP <80 MM HG: CPT | Performed by: OTHER

## 2021-08-10 RX ORDER — PROPRANOLOL HYDROCHLORIDE 20 MG/1
20 TABLET ORAL 2 TIMES DAILY
Qty: 60 TABLET | Refills: 2 | Status: SHIPPED | OUTPATIENT
Start: 2021-08-10 | End: 2021-11-08

## 2021-08-10 NOTE — PROGRESS NOTES
HPI:    Patient ID: Luigi Coburn is a 73year old female. PCP: Dr Brad Garcia    HPI   Interim history: Patient presents here for follow-up for benign essential tremors, muscle cramps/spasms and headaches.   Patient reports since last office visit she has not encounter    HISTORY:  Past Medical History:   Diagnosis Date   • Bronchitis    • Colon abnormality     spastic   • Depression    • Tremor    • Ulcer    • 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[facility] [address]       Past Surgical History:   Procedure Laterality Date   • H Carbonate Antacid 500 MG Oral Chew Tab Chew 1 tablet by mouth as needed. • cetirizine 10 MG Oral Tab Take 10 mg by mouth as needed.          Allergies:  Acetaminophen           HIVES    Comment:Other reaction(s): ACETAMINOPHEN  Erythromycin ataxic and mild shuffling. TESTS/IMAGING:     MRI brain w and wo contrast  1.  Moderate changes of chronic small vessel disease in cerebral white matter have progressed since 2010.   2. Stable left temporal, parietal and basal ganglionic/thalamic venou

## 2021-08-10 NOTE — PROGRESS NOTES
LOV 6/2/21 Tremors & Gait f/u- Patient had MRI BRAIN done 6/8/21. Patient states her tremors are getting worse in BLE, HEAD, & BUE. Patient states her balance is the same as LOV. Patient is taking PROPRANOLOL HCL 10 MG Oral Tab TID.

## 2021-08-10 NOTE — PROGRESS NOTES
HPI:    Patient ID: Reny Plummer is a 73year old female.     HPI     Headache- posterior head to left eye sharp stabbing pain- days constant      HISTORY:  Past Medical History:   Diagnosis Date   • Bronchitis    • Colon abnormality     spastic   • Depre (Topical)        RASH    Comment:Other reaction(s): IODINE  PHYSICAL EXAM:   Physical Exam    General Appearance: Well nourished, well developed, no apparent distress. HEENT: Normocephalic and atraumatic. Normal sclera.  Moist mucus membrane  Neck: Norm Visit:  Requested Prescriptions      No prescriptions requested or ordered in this encounter       Imaging & Referrals:  None     SX#[id number]

## 2021-08-12 ENCOUNTER — TELEPHONE (OUTPATIENT)
Dept: NEUROLOGY | Facility: CLINIC | Age: 66
End: 2021-08-12

## 2021-08-12 DIAGNOSIS — M54.81 OCCIPITAL NEURALGIA OF LEFT SIDE: Primary | ICD-10-CM

## 2021-08-12 NOTE — TELEPHONE ENCOUNTER
No order available for nerve block from Dr. Agapito Sandra     Notified Dr. Agapito Sandra and nursing  for follow up

## 2021-08-12 NOTE — TELEPHONE ENCOUNTER
Contacted Human automated line Case/Reference # [id number] to initiate authorization for occipital nerve block CPT 81278+ to be done in office for 3 units.   Coverage is active    Status: Approved-authorization #[id number] valid 8/12/21-2/8/22 for 3

## 2021-08-17 ENCOUNTER — OFFICE VISIT (OUTPATIENT)
Dept: NEUROLOGY | Facility: CLINIC | Age: 66
End: 2021-08-17
Payer: MEDICARE

## 2021-08-17 DIAGNOSIS — M54.81 OCCIPITAL NEURALGIA OF LEFT SIDE: Primary | ICD-10-CM

## 2021-08-17 DIAGNOSIS — R25.2 SPASM: ICD-10-CM

## 2021-08-17 PROCEDURE — 64405 NJX AA&/STRD GR OCPL NRV: CPT | Performed by: OTHER

## 2021-08-17 RX ORDER — LIDOCAINE HYDROCHLORIDE 10 MG/ML
2 INJECTION, SOLUTION INFILTRATION; PERINEURAL ONCE
Status: COMPLETED | OUTPATIENT
Start: 2021-08-17 | End: 2021-08-17

## 2021-08-17 RX ORDER — TRIAMCINOLONE ACETONIDE 40 MG/ML
20 INJECTION, SUSPENSION INTRA-ARTICULAR; INTRAMUSCULAR ONCE
Status: COMPLETED | OUTPATIENT
Start: 2021-08-17 | End: 2021-08-17

## 2021-08-17 NOTE — PROCEDURES
Procedure: Left occipital nerve block     Consent: obtained after explanation of procedure detail, benefits and risks     Indication:  Left occipital neuralgia     Procedure description: The areas are prepped and cleaned with betadine scrub an

## 2021-09-11 RX ORDER — PROPRANOLOL HYDROCHLORIDE 10 MG/1
TABLET ORAL
Qty: 270 TABLET | Refills: 1 | OUTPATIENT
Start: 2021-09-11

## 2021-09-11 NOTE — TELEPHONE ENCOUNTER
Duplicate request, previously addressed. From 8/10/21 office visit with Dr Doris Gupta  . 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 Discussed MRI brain w and wo contrast result. Stable venous angioma in left BG and left parietal periventricular as well within left cerebellum and no evidence of hemangioblastoma or bleeding.   Increase dose of Propranolol to 20 mg BID for tremor control  May add Sinemet depending upon her progress

## 2021-09-15 ENCOUNTER — TELEPHONE (OUTPATIENT)
Dept: INTERNAL MEDICINE CLINIC | Facility: CLINIC | Age: 66
End: 2021-09-15

## 2021-09-15 DIAGNOSIS — M25.561 CHRONIC PAIN OF RIGHT KNEE: Primary | ICD-10-CM

## 2021-09-15 DIAGNOSIS — G89.29 CHRONIC PAIN OF RIGHT KNEE: Primary | ICD-10-CM

## 2021-09-15 NOTE — TELEPHONE ENCOUNTER
Referrals placed, unable to leave message for the patient please let her know to watch for referral approval from my chart

## 2021-09-15 NOTE — TELEPHONE ENCOUNTER
Pt calling to request referrals for Ortho and podiatry. She states she was told 6 years ago by Dr Darcy Quijano that she will soon need a right knee replacement since she has \"bone on bone\".  She states \"he removed bone from my knee and told me that I would h

## 2021-09-17 NOTE — TELEPHONE ENCOUNTER
Please see below, please advise patient if she can see this orthopedic specialist and will be covered by her insurance?

## 2021-09-17 NOTE — TELEPHONE ENCOUNTER
Advised patient of Dr Neno contreras. Patient would like an external orthopedic doctor that she researched.      Dr. Chase Ray in [address] [place] [address] who practices out of [facility] Referral pending for your review and approval.

## 2021-09-20 ENCOUNTER — TELEPHONE (OUTPATIENT)
Dept: NEUROLOGY | Facility: CLINIC | Age: 66
End: 2021-09-20

## 2021-09-20 NOTE — TELEPHONE ENCOUNTER
Dr. Dago Rojas is in-network with plan, referral is on OPEN status. Patient advised to contact [facility].

## 2021-09-20 NOTE — TELEPHONE ENCOUNTER
Called & spoke to pt who verified she had been using up an old prescription for Propranolol & she had forgotten she got new prescription filled last month. Pt to locate prescription bottle containing 90d supply dispensed 8/10/21.    Pt advised Dr Yan hubbard

## 2021-09-27 ENCOUNTER — TELEPHONE (OUTPATIENT)
Dept: INTERNAL MEDICINE CLINIC | Facility: CLINIC | Age: 66
End: 2021-09-27

## 2021-09-27 DIAGNOSIS — Z20.822 EXPOSURE TO COVID-19 VIRUS: Primary | ICD-10-CM

## 2021-09-27 RX ORDER — AZITHROMYCIN 250 MG/1
TABLET, FILM COATED ORAL
Qty: 6 TABLET | Refills: 0 | Status: SHIPPED | OUTPATIENT
Start: 2021-09-27 | End: 2021-10-02

## 2021-09-27 NOTE — TELEPHONE ENCOUNTER
Spoke to patient, will treat her with Zithromax. Advised her to get tested for Covid place order, she has symptoms for 2 days only.   Will get tested on Thursday if she is feeling like she is getting worse, I did reiterate importance of needing to be teste

## 2021-09-27 NOTE — TELEPHONE ENCOUNTER
Action Requested: Summary for Provider     []  Critical Lab, Recommendations Needed  [] Need Additional Advice  []   FYI    []   Need Orders  [] Need Medications Sent to Pharmacy  []  Other     SUMMARY: pt requests Dr Adama Goldstein call her.     Pt states \"I have

## 2021-09-29 ENCOUNTER — LAB ENCOUNTER (OUTPATIENT)
Dept: LAB | Age: 66
End: 2021-09-29
Attending: INTERNAL MEDICINE
Payer: MEDICARE

## 2021-09-29 DIAGNOSIS — Z20.822 EXPOSURE TO COVID-19 VIRUS: ICD-10-CM

## 2021-10-01 ENCOUNTER — TELEPHONE (OUTPATIENT)
Dept: CASE MANAGEMENT | Age: 66
End: 2021-10-01

## 2021-10-01 ENCOUNTER — HOSPITAL ENCOUNTER (OUTPATIENT)
Age: 66
Discharge: HOME | End: 2021-10-01
Payer: MEDICARE

## 2021-10-01 VITALS
SYSTOLIC BLOOD PRESSURE: 122 MMHG | TEMPERATURE: 99 F | DIASTOLIC BLOOD PRESSURE: 69 MMHG | HEART RATE: 75 BPM | RESPIRATION RATE: 18 BRPM | OXYGEN SATURATION: 95 %

## 2021-10-01 DIAGNOSIS — U07.1 COVID-19: Primary | ICD-10-CM

## 2021-10-01 PROCEDURE — 99204 OFFICE O/P NEW MOD 45 MIN: CPT

## 2021-10-01 PROCEDURE — 99214 OFFICE O/P EST MOD 30 MIN: CPT

## 2021-10-01 NOTE — TELEPHONE ENCOUNTER
The patient calling to ask about her covid test results   Detected. I informed the patient     Please advise. Would the patient qualify for the MAB? The patient stated she continues to cough and denies shortness of breath when not coughing.    She stat

## 2021-10-01 NOTE — TELEPHONE ENCOUNTER
Pt received PAB infusion at [address] [place] [address] on 10/1/21 for COVID-19. Please follow-up with pt for post-infusion assessment and home monitoring if needed. Thank you.

## 2021-10-01 NOTE — ED PROVIDER NOTES
Patient Seen in: [facility]      History   Patient presents with:  Cough    Stated Complaint:     Subjective:   HPI    This is a 76-year-old female who presents for cough patient states that on 9/27 she called her primary care office after sh 122/69   Pulse 75   Temp 98.7 °F (37.1 °C) (Temporal)   Resp 18   SpO2 95%         Physical Exam  Vitals and nursing note reviewed. Constitutional:       General: She is awake. She is not in acute distress. Appearance: Normal appearance.  She is not i and oriented to person, place, and time. Psychiatric:         Mood and Affect: Mood normal.         Behavior: Behavior normal. Behavior is cooperative. Thought Content:  Thought content normal.         Judgment: Judgment normal.       ED Course .    The patient/caregiver has been given the “Fact Sheet for Patients, Parents and Caregivers”, informed of alternatives to receiving authorized Regen-COV, and informed that casirivimab and imdevimab are unapproved drugs that are authorized for use under

## 2021-10-01 NOTE — ED INITIAL ASSESSMENT (HPI)
Cough and congestion since Saturday, dx with covid sept 29, was given zpack , last dose today, denies fever, denies sob, pt unvaccinated

## 2021-10-01 NOTE — TELEPHONE ENCOUNTER
In my opinion she she should be seen at [facility]  they have  ability to assess  severity of illness and  do Ab infusions if indicated, check pulse ox , x rays etc

## 2021-10-01 NOTE — TELEPHONE ENCOUNTER
Pt contacted and informed her of Dr. Lopez Goods message.   She verbalized understanding and agrees with plan

## 2021-10-04 NOTE — TELEPHONE ENCOUNTER
Please do not sign encounter      Home Monitoring Day 5 of 7. What  was your temp today? - 97.6    How did you take your temp?     with a tympanic thermometer      What was your pulse ox today?   No Pulse oximeter      Are you feeling short of breath tod

## 2021-10-07 NOTE — TELEPHONE ENCOUNTER
Patient does not have inpatient/ED order for home monitoring. Per department process only required one follow up call post PAB infusion. Closing encounter.

## 2021-10-10 RX ORDER — OMEPRAZOLE 20 MG/1
CAPSULE, DELAYED RELEASE ORAL
Qty: 90 CAPSULE | Refills: 0 | Status: SHIPPED | OUTPATIENT
Start: 2021-10-10

## 2021-10-11 NOTE — TELEPHONE ENCOUNTER
The patient is calling to stated she is very weak now and having a hard time walking. She stated she entire body is in pain:  Joint, hips and started before covid but now is worse. Denies a fever, shortness of breath.    She stated is still exhausted an

## 2021-10-12 NOTE — TELEPHONE ENCOUNTER
Home Monitoring Day Discontinued     What  was your temp today? -98.3     How did you take your temp?     with a tympanic thermometer      What was your pulse ox today?  none  RN educated patient about pulse oximeter, how to use it, numbers in normal ran

## 2021-10-13 ENCOUNTER — NURSE TRIAGE (OUTPATIENT)
Dept: INTERNAL MEDICINE CLINIC | Facility: CLINIC | Age: 66
End: 2021-10-13

## 2021-10-13 NOTE — TELEPHONE ENCOUNTER
Action Requested: Summary for Provider     []  Critical Lab, Recommendations Needed  [] Need Additional Advice  []   FYI    []   Need Orders  [x] Need Medications Sent to Pharmacy  []  Other     SUMMARY: Patient c/o thick white coating on entire mouth; abdiel

## 2021-11-08 RX ORDER — PROPRANOLOL HYDROCHLORIDE 20 MG/1
TABLET ORAL
Qty: 180 TABLET | Refills: 0 | Status: SHIPPED | OUTPATIENT
Start: 2021-11-08

## 2021-11-08 NOTE — TELEPHONE ENCOUNTER
Refill request for propranolol 20 mg, BID, #180, no refills    LOV: 8/17/21  NOV: None  Last refilled on 8/10/21 for 60 tabs with 2 refills

## 2021-11-09 ENCOUNTER — TELEPHONE (OUTPATIENT)
Dept: INTERNAL MEDICINE CLINIC | Facility: CLINIC | Age: 66
End: 2021-11-09

## 2021-11-09 DIAGNOSIS — Z20.822 EXPOSURE TO COVID-19 VIRUS: Primary | ICD-10-CM

## 2021-11-09 NOTE — TELEPHONE ENCOUNTER
Had antibody infusion treatment ~ 9-28-21. Has an orthopedic appointment 12/1/2021. Ortho office is requiring a Covid test prior to her visit.     Patient asking where she can go for a free Covid test. Advised to contact her insurance company or to resear

## 2021-11-09 NOTE — TELEPHONE ENCOUNTER
I placed order for Covid testing for [place], patient needs to make an appointment, this test placed as a testing prior to procedure as we do before procedures at the hospital.  Not sure how orthopedic office works usually at least 48 hours prior to the v

## 2021-11-09 NOTE — TELEPHONE ENCOUNTER
Patient called back. Informed order in chart if she chooses to do it at our [employer]    Central Scheduling  [phone] to schedule your test.

## 2021-11-27 ENCOUNTER — LAB ENCOUNTER (OUTPATIENT)
Dept: LAB | Age: 66
End: 2021-11-27
Attending: INTERNAL MEDICINE
Payer: MEDICARE

## 2021-11-27 DIAGNOSIS — Z20.822 EXPOSURE TO COVID-19 VIRUS: ICD-10-CM

## 2021-11-30 ENCOUNTER — TELEPHONE (OUTPATIENT)
Dept: INTERNAL MEDICINE CLINIC | Facility: CLINIC | Age: 66
End: 2021-11-30

## 2021-11-30 NOTE — TELEPHONE ENCOUNTER
Patient calling regarding her Covid results. Advised that it was negative. She would like to  a copy of her results today. [facility] and they will have it ready for her.       Collected 11/27/2021  9:01 AM     Status: Final resu

## 2022-01-14 ENCOUNTER — TELEPHONE (OUTPATIENT)
Dept: CASE MANAGEMENT | Age: 67
End: 2022-01-14

## 2022-01-14 NOTE — TELEPHONE ENCOUNTER
Advised patient of Darcy's note. Patient verbalized understanding. She will call back for an appointment.

## 2022-03-22 ENCOUNTER — TELEPHONE (OUTPATIENT)
Dept: INTERNAL MEDICINE CLINIC | Facility: CLINIC | Age: 67
End: 2022-03-22

## 2022-05-18 ENCOUNTER — MED REC SCAN ONLY (OUTPATIENT)
Dept: INTERNAL MEDICINE CLINIC | Facility: CLINIC | Age: 67
End: 2022-05-18

## 2022-05-18 ENCOUNTER — OFFICE VISIT (OUTPATIENT)
Dept: INTERNAL MEDICINE CLINIC | Facility: CLINIC | Age: 67
End: 2022-05-18
Payer: MEDICARE

## 2022-05-18 VITALS
BODY MASS INDEX: 36.4 KG/M2 | HEART RATE: 84 BPM | WEIGHT: 208 LBS | SYSTOLIC BLOOD PRESSURE: 112 MMHG | DIASTOLIC BLOOD PRESSURE: 77 MMHG | HEIGHT: 63.5 IN

## 2022-05-18 DIAGNOSIS — F33.41 RECURRENT MAJOR DEPRESSIVE DISORDER, IN PARTIAL REMISSION (HCC): ICD-10-CM

## 2022-05-18 DIAGNOSIS — R07.89 OTHER CHEST PAIN: ICD-10-CM

## 2022-05-18 DIAGNOSIS — M25.552 HIP PAIN, CHRONIC, LEFT: ICD-10-CM

## 2022-05-18 DIAGNOSIS — M25.561 CHRONIC PAIN OF BOTH KNEES: ICD-10-CM

## 2022-05-18 DIAGNOSIS — G89.29 CHRONIC PAIN OF BOTH KNEES: ICD-10-CM

## 2022-05-18 DIAGNOSIS — G89.29 HIP PAIN, CHRONIC, LEFT: ICD-10-CM

## 2022-05-18 DIAGNOSIS — G45.9 TIA (TRANSIENT ISCHEMIC ATTACK): Primary | ICD-10-CM

## 2022-05-18 DIAGNOSIS — M25.562 CHRONIC PAIN OF BOTH KNEES: ICD-10-CM

## 2022-05-18 PROCEDURE — 3074F SYST BP LT 130 MM HG: CPT | Performed by: INTERNAL MEDICINE

## 2022-05-18 PROCEDURE — 3078F DIAST BP <80 MM HG: CPT | Performed by: INTERNAL MEDICINE

## 2022-05-18 PROCEDURE — 99214 OFFICE O/P EST MOD 30 MIN: CPT | Performed by: INTERNAL MEDICINE

## 2022-05-18 PROCEDURE — 3008F BODY MASS INDEX DOCD: CPT | Performed by: INTERNAL MEDICINE

## 2022-07-11 ENCOUNTER — TELEPHONE (OUTPATIENT)
Dept: INTERNAL MEDICINE CLINIC | Facility: CLINIC | Age: 67
End: 2022-07-11

## 2022-07-29 ENCOUNTER — TELEPHONE (OUTPATIENT)
Dept: INTERNAL MEDICINE CLINIC | Facility: CLINIC | Age: 67
End: 2022-07-29

## 2022-09-14 ENCOUNTER — TELEPHONE (OUTPATIENT)
Dept: INTERNAL MEDICINE CLINIC | Facility: CLINIC | Age: 67
End: 2022-09-14

## 2022-10-08 ENCOUNTER — APPOINTMENT (OUTPATIENT)
Dept: GENERAL RADIOLOGY | Facility: HOSPITAL | Age: 67
End: 2022-10-08
Attending: EMERGENCY MEDICINE
Payer: MEDICARE

## 2022-10-08 ENCOUNTER — HOSPITAL ENCOUNTER (EMERGENCY)
Facility: HOSPITAL | Age: 67
Discharge: HOME | End: 2022-10-08
Attending: EMERGENCY MEDICINE
Payer: MEDICARE

## 2022-10-08 VITALS
RESPIRATION RATE: 18 BRPM | HEART RATE: 89 BPM | WEIGHT: 203 LBS | BODY MASS INDEX: 35 KG/M2 | OXYGEN SATURATION: 99 % | SYSTOLIC BLOOD PRESSURE: 159 MMHG | TEMPERATURE: 99 F | DIASTOLIC BLOOD PRESSURE: 105 MMHG

## 2022-10-08 DIAGNOSIS — S80.01XA CONTUSION OF RIGHT KNEE, INITIAL ENCOUNTER: Primary | ICD-10-CM

## 2022-10-08 PROCEDURE — 99283 EMERGENCY DEPT VISIT LOW MDM: CPT

## 2022-10-08 PROCEDURE — 73560 X-RAY EXAM OF KNEE 1 OR 2: CPT | Performed by: EMERGENCY MEDICINE

## 2022-10-09 NOTE — ED INITIAL ASSESSMENT (HPI)
AOx4. Complaints of right knee pain after fall 30 mins pta. Denies head injury.  Unable to bear weight

## 2022-10-11 ENCOUNTER — OFFICE VISIT (OUTPATIENT)
Dept: INTERNAL MEDICINE CLINIC | Facility: CLINIC | Age: 67
End: 2022-10-11
Payer: MEDICARE

## 2022-10-11 ENCOUNTER — TELEPHONE (OUTPATIENT)
Dept: ORTHOPEDICS CLINIC | Facility: CLINIC | Age: 67
End: 2022-10-11

## 2022-10-11 ENCOUNTER — TELEPHONE (OUTPATIENT)
Dept: INTERNAL MEDICINE CLINIC | Facility: CLINIC | Age: 67
End: 2022-10-11

## 2022-10-11 VITALS
HEART RATE: 92 BPM | BODY MASS INDEX: 35.87 KG/M2 | SYSTOLIC BLOOD PRESSURE: 135 MMHG | HEIGHT: 63.5 IN | DIASTOLIC BLOOD PRESSURE: 80 MMHG | OXYGEN SATURATION: 98 % | WEIGHT: 205 LBS

## 2022-10-11 DIAGNOSIS — G89.29 CHRONIC PAIN OF RIGHT KNEE: Primary | ICD-10-CM

## 2022-10-11 DIAGNOSIS — R25.1 TREMOR DUE TO DISORDER OF CNS: ICD-10-CM

## 2022-10-11 DIAGNOSIS — G96.9 TREMOR DUE TO DISORDER OF CNS: ICD-10-CM

## 2022-10-11 DIAGNOSIS — M25.561 ACUTE PAIN OF RIGHT KNEE: ICD-10-CM

## 2022-10-11 DIAGNOSIS — M25.561 CHRONIC PAIN OF RIGHT KNEE: Primary | ICD-10-CM

## 2022-10-11 DIAGNOSIS — G25.9 MOVEMENT DISORDER: ICD-10-CM

## 2022-10-11 PROCEDURE — 3075F SYST BP GE 130 - 139MM HG: CPT | Performed by: INTERNAL MEDICINE

## 2022-10-11 PROCEDURE — 1125F AMNT PAIN NOTED PAIN PRSNT: CPT | Performed by: INTERNAL MEDICINE

## 2022-10-11 PROCEDURE — 3079F DIAST BP 80-89 MM HG: CPT | Performed by: INTERNAL MEDICINE

## 2022-10-11 PROCEDURE — 99214 OFFICE O/P EST MOD 30 MIN: CPT | Performed by: INTERNAL MEDICINE

## 2022-10-11 PROCEDURE — 3008F BODY MASS INDEX DOCD: CPT | Performed by: INTERNAL MEDICINE

## 2022-10-11 NOTE — TELEPHONE ENCOUNTER
Called pt and appt was overbooked on 10/19 @ 1pm with Dr Missy Morales. Address given.  [facility]

## 2022-10-11 NOTE — TELEPHONE ENCOUNTER
Cam you ask dr Madelin Zarate to see this pt  Acute injury to the knee  supperimposed on chronic pain,

## 2022-10-11 NOTE — TELEPHONE ENCOUNTER
Provider requesting patient be seen by you for knee contusion. Please advise if she can be added on tomorrow or next week.

## 2022-10-11 NOTE — TELEPHONE ENCOUNTER
I don't have time tomorrow unless thee is a cancellation. Many of my slots are already double booked. I can squeeze her in next week, however.

## 2022-10-11 NOTE — TELEPHONE ENCOUNTER
Patient calling to make an appointment for right knee. Please see TE from today Dr. Rita Thomas reached out to doctor.  Please advise

## 2022-10-13 ENCOUNTER — LAB ENCOUNTER (OUTPATIENT)
Dept: LAB | Age: 67
End: 2022-10-13
Attending: INTERNAL MEDICINE
Payer: MEDICARE

## 2022-10-13 DIAGNOSIS — M25.561 CHRONIC PAIN OF RIGHT KNEE: ICD-10-CM

## 2022-10-13 DIAGNOSIS — M25.561 ACUTE PAIN OF RIGHT KNEE: ICD-10-CM

## 2022-10-13 DIAGNOSIS — G89.29 CHRONIC PAIN OF RIGHT KNEE: ICD-10-CM

## 2022-10-13 LAB
ALBUMIN SERPL-MCNC: 3.7 G/DL (ref 3.4–5)
ALBUMIN/GLOB SERPL: 1 {RATIO} (ref 1–2)
ALP LIVER SERPL-CCNC: 67 U/L
ALT SERPL-CCNC: 32 U/L
ANION GAP SERPL CALC-SCNC: 7 MMOL/L (ref 0–18)
AST SERPL-CCNC: 20 U/L (ref 15–37)
BASOPHILS # BLD AUTO: 0.06 X10(3) UL (ref 0–0.2)
BASOPHILS NFR BLD AUTO: 0.9 %
BILIRUB SERPL-MCNC: 0.7 MG/DL (ref 0.1–2)
BUN BLD-MCNC: 22 MG/DL (ref 7–18)
BUN/CREAT SERPL: 23.9 (ref 10–20)
CALCIUM BLD-MCNC: 9.6 MG/DL (ref 8.5–10.1)
CHLORIDE SERPL-SCNC: 108 MMOL/L (ref 98–112)
CO2 SERPL-SCNC: 27 MMOL/L (ref 21–32)
CREAT BLD-MCNC: 0.92 MG/DL
DEPRECATED RDW RBC AUTO: 42.5 FL (ref 35.1–46.3)
EOSINOPHIL # BLD AUTO: 0.23 X10(3) UL (ref 0–0.7)
EOSINOPHIL NFR BLD AUTO: 3.3 %
ERYTHROCYTE [DISTWIDTH] IN BLOOD BY AUTOMATED COUNT: 12.9 % (ref 11–15)
FASTING STATUS PATIENT QL REPORTED: NO
GFR SERPLBLD BASED ON 1.73 SQ M-ARVRAT: 69 ML/MIN/1.73M2 (ref 60–?)
GLOBULIN PLAS-MCNC: 3.8 G/DL (ref 2.8–4.4)
GLUCOSE BLD-MCNC: 82 MG/DL (ref 70–99)
HCT VFR BLD AUTO: 41 %
HGB BLD-MCNC: 13.3 G/DL
IMM GRANULOCYTES # BLD AUTO: 0.02 X10(3) UL (ref 0–1)
IMM GRANULOCYTES NFR BLD: 0.3 %
LYMPHOCYTES # BLD AUTO: 1.98 X10(3) UL (ref 1–4)
LYMPHOCYTES NFR BLD AUTO: 28.6 %
MCH RBC QN AUTO: 29.2 PG (ref 26–34)
MCHC RBC AUTO-ENTMCNC: 32.4 G/DL (ref 31–37)
MCV RBC AUTO: 90.1 FL
MONOCYTES # BLD AUTO: 0.66 X10(3) UL (ref 0.1–1)
MONOCYTES NFR BLD AUTO: 9.5 %
NEUTROPHILS # BLD AUTO: 3.98 X10 (3) UL (ref 1.5–7.7)
NEUTROPHILS # BLD AUTO: 3.98 X10(3) UL (ref 1.5–7.7)
NEUTROPHILS NFR BLD AUTO: 57.4 %
OSMOLALITY SERPL CALC.SUM OF ELEC: 296 MOSM/KG (ref 275–295)
PLATELET # BLD AUTO: 266 10(3)UL (ref 150–450)
POTASSIUM SERPL-SCNC: 4.5 MMOL/L (ref 3.5–5.1)
PROT SERPL-MCNC: 7.5 G/DL (ref 6.4–8.2)
RBC # BLD AUTO: 4.55 X10(6)UL
SODIUM SERPL-SCNC: 142 MMOL/L (ref 136–145)
WBC # BLD AUTO: 6.9 X10(3) UL (ref 4–11)

## 2022-10-13 PROCEDURE — 80053 COMPREHEN METABOLIC PANEL: CPT

## 2022-10-13 PROCEDURE — 36415 COLL VENOUS BLD VENIPUNCTURE: CPT

## 2022-10-13 PROCEDURE — 85025 COMPLETE CBC W/AUTO DIFF WBC: CPT

## 2022-10-19 ENCOUNTER — OFFICE VISIT (OUTPATIENT)
Dept: ORTHOPEDICS CLINIC | Facility: CLINIC | Age: 67
End: 2022-10-19
Payer: MEDICARE

## 2022-10-19 ENCOUNTER — HOSPITAL ENCOUNTER (OUTPATIENT)
Dept: GENERAL RADIOLOGY | Facility: HOSPITAL | Age: 67
Discharge: HOME | End: 2022-10-19
Attending: ORTHOPAEDIC SURGERY
Payer: MEDICARE

## 2022-10-19 VITALS — DIASTOLIC BLOOD PRESSURE: 75 MMHG | HEART RATE: 73 BPM | SYSTOLIC BLOOD PRESSURE: 125 MMHG

## 2022-10-19 DIAGNOSIS — M17.11 PRIMARY OSTEOARTHRITIS OF RIGHT KNEE: ICD-10-CM

## 2022-10-19 DIAGNOSIS — R52 PAIN: ICD-10-CM

## 2022-10-19 DIAGNOSIS — R52 PAIN: Primary | ICD-10-CM

## 2022-10-19 PROCEDURE — 1125F AMNT PAIN NOTED PAIN PRSNT: CPT | Performed by: ORTHOPAEDIC SURGERY

## 2022-10-19 PROCEDURE — 3078F DIAST BP <80 MM HG: CPT | Performed by: ORTHOPAEDIC SURGERY

## 2022-10-19 PROCEDURE — 73562 X-RAY EXAM OF KNEE 3: CPT | Performed by: ORTHOPAEDIC SURGERY

## 2022-10-19 PROCEDURE — 3074F SYST BP LT 130 MM HG: CPT | Performed by: ORTHOPAEDIC SURGERY

## 2022-10-19 PROCEDURE — 20610 DRAIN/INJ JOINT/BURSA W/O US: CPT | Performed by: ORTHOPAEDIC SURGERY

## 2022-10-19 PROCEDURE — 99203 OFFICE O/P NEW LOW 30 MIN: CPT | Performed by: ORTHOPAEDIC SURGERY

## 2022-10-19 RX ORDER — TRIAMCINOLONE ACETONIDE 40 MG/ML
40 INJECTION, SUSPENSION INTRA-ARTICULAR; INTRAMUSCULAR ONCE
Status: COMPLETED | OUTPATIENT
Start: 2022-10-19 | End: 2022-10-19

## 2022-10-19 RX ADMIN — TRIAMCINOLONE ACETONIDE 40 MG: 40 INJECTION, SUSPENSION INTRA-ARTICULAR; INTRAMUSCULAR at 15:55:00

## 2022-10-19 NOTE — PROGRESS NOTES
Per verbal order from Dr. Dyer Yazoo, draw up and 4ml of 0.5% Marcaine and 1ml of Kenalog 40 for injection into right knee. Le Malhotra RN  Patient provided education handout for cortisone injection.

## 2022-11-02 ENCOUNTER — HOSPITAL ENCOUNTER (OUTPATIENT)
Dept: MRI IMAGING | Facility: HOSPITAL | Age: 67
Discharge: HOME | End: 2022-11-02
Attending: INTERNAL MEDICINE
Payer: MEDICARE

## 2022-11-02 DIAGNOSIS — M25.561 ACUTE PAIN OF RIGHT KNEE: ICD-10-CM

## 2022-11-02 DIAGNOSIS — M25.561 CHRONIC PAIN OF RIGHT KNEE: ICD-10-CM

## 2022-11-02 DIAGNOSIS — G89.29 CHRONIC PAIN OF RIGHT KNEE: ICD-10-CM

## 2022-11-02 PROCEDURE — 73723 MRI JOINT LWR EXTR W/O&W/DYE: CPT | Performed by: INTERNAL MEDICINE

## 2022-11-02 PROCEDURE — A9575 INJ GADOTERATE MEGLUMI 0.1ML: HCPCS | Performed by: INTERNAL MEDICINE

## 2022-11-02 RX ORDER — GADOTERATE MEGLUMINE 376.9 MG/ML
20 INJECTION INTRAVENOUS
Status: COMPLETED | OUTPATIENT
Start: 2022-11-02 | End: 2022-11-02

## 2022-11-02 RX ADMIN — GADOTERATE MEGLUMINE 19 ML: 376.9 INJECTION INTRAVENOUS at 17:47:00

## 2022-11-10 ENCOUNTER — TELEPHONE (OUTPATIENT)
Dept: INTERNAL MEDICINE CLINIC | Facility: CLINIC | Age: 67
End: 2022-11-10

## 2022-11-10 RX ORDER — OMEPRAZOLE 20 MG/1
20 CAPSULE, DELAYED RELEASE ORAL
Qty: 90 CAPSULE | Refills: 1 | Status: SHIPPED | OUTPATIENT
Start: 2022-11-10

## 2022-11-11 ENCOUNTER — TELEPHONE (OUTPATIENT)
Dept: INTERNAL MEDICINE CLINIC | Facility: CLINIC | Age: 67
End: 2022-11-11

## 2022-11-11 NOTE — TELEPHONE ENCOUNTER
Spoke to patient, advised to call insurance and find out list of neurologists and participate in her plan, patient also inquired if movement disorder clinic from [facility] is part of her plan.   She will get back to us when she has more information

## 2022-11-11 NOTE — TELEPHONE ENCOUNTER
Pt returned to call, stated Dr Randall Jacome called her last night with MRI results, will be out of home today but may call back and leave a detailed message

## 2022-11-16 ENCOUNTER — HOSPITAL ENCOUNTER (OUTPATIENT)
Dept: CV DIAGNOSTICS | Facility: HOSPITAL | Age: 67
Discharge: HOME | End: 2022-11-16
Attending: INTERNAL MEDICINE
Payer: MEDICARE

## 2022-11-16 ENCOUNTER — HOSPITAL ENCOUNTER (OUTPATIENT)
Dept: ULTRASOUND IMAGING | Facility: HOSPITAL | Age: 67
Discharge: HOME | End: 2022-11-16
Attending: INTERNAL MEDICINE
Payer: MEDICARE

## 2022-11-16 DIAGNOSIS — G45.9 TIA (TRANSIENT ISCHEMIC ATTACK): ICD-10-CM

## 2022-11-16 PROCEDURE — 93880 EXTRACRANIAL BILAT STUDY: CPT | Performed by: INTERNAL MEDICINE

## 2022-11-16 PROCEDURE — 93306 TTE W/DOPPLER COMPLETE: CPT | Performed by: INTERNAL MEDICINE

## 2022-11-17 ENCOUNTER — TELEPHONE (OUTPATIENT)
Dept: INTERNAL MEDICINE CLINIC | Facility: CLINIC | Age: 67
End: 2022-11-17

## 2022-11-17 NOTE — TELEPHONE ENCOUNTER
Patient returning [address] [place] call. Patient asked for nurse to read the results from [address] Carotid arteries and the Echocardiogram. 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 Patient thanked nurse for reading results and looks forward to call from [address] [place] [address] for her advice. Patient aware [address] [place] [address] is out of the office until 11/21/22.

## 2022-11-18 NOTE — TELEPHONE ENCOUNTER
Spoke to patient at length reviewed carotid Doppler ultrasound negative for significant stenosis. 2D Doppler echocardiogram shows possible Foramen ovale that requires further evaluation with NUVIA which will be performed by cardiologist.  Provided patient with the name of the cardiologist and phone number to make appointment, stressed importance of following for his history of TIA versus serious issue that needs to be addressed. Patient stated that she will follow through, she takes aspirin every day.

## 2022-11-23 ENCOUNTER — OFFICE VISIT (OUTPATIENT)
Dept: ORTHOPEDICS CLINIC | Facility: CLINIC | Age: 67
End: 2022-11-23
Payer: MEDICARE

## 2022-11-23 DIAGNOSIS — E66.01 CLASS 2 SEVERE OBESITY DUE TO EXCESS CALORIES WITH SERIOUS COMORBIDITY AND BODY MASS INDEX (BMI) OF 35.0 TO 35.9 IN ADULT (HCC): ICD-10-CM

## 2022-11-23 DIAGNOSIS — M17.11 PRIMARY OSTEOARTHRITIS OF RIGHT KNEE: Primary | ICD-10-CM

## 2022-11-23 PROCEDURE — 99214 OFFICE O/P EST MOD 30 MIN: CPT | Performed by: ORTHOPAEDIC SURGERY

## 2022-11-23 PROCEDURE — 1125F AMNT PAIN NOTED PAIN PRSNT: CPT | Performed by: ORTHOPAEDIC SURGERY

## 2022-11-29 ENCOUNTER — TELEPHONE (OUTPATIENT)
Dept: INTERNAL MEDICINE CLINIC | Facility: CLINIC | Age: 67
End: 2022-11-29

## 2022-11-29 ENCOUNTER — TELEPHONE (OUTPATIENT)
Dept: ORTHOPEDICS CLINIC | Facility: CLINIC | Age: 67
End: 2022-11-29

## 2022-11-29 NOTE — TELEPHONE ENCOUNTER
One right knee Durolane injection has been approved via [employer] online. Auth # [id number] is valid from 11/29/2022 to 2/27/2023. Okay to schedule.

## 2022-11-29 NOTE — TELEPHONE ENCOUNTER
Patient is eligible for a 2023 Medicare Annual Wellness visit. This visit can be scheduled any time in the calendar year. Discussed in detail w/patient. Appt scheduled for 1/24/23.

## 2022-12-01 NOTE — TELEPHONE ENCOUNTER
Per pt is worried she might be allergic, asking what kind of testing she can have done to find out. Please advise, OK to leave detailed VM.

## 2023-01-24 ENCOUNTER — OFFICE VISIT (OUTPATIENT)
Dept: INTERNAL MEDICINE CLINIC | Facility: CLINIC | Age: 68
End: 2023-01-24

## 2023-01-24 VITALS
HEIGHT: 63.5 IN | WEIGHT: 200 LBS | SYSTOLIC BLOOD PRESSURE: 138 MMHG | DIASTOLIC BLOOD PRESSURE: 86 MMHG | OXYGEN SATURATION: 98 % | BODY MASS INDEX: 35 KG/M2 | HEART RATE: 66 BPM

## 2023-01-24 DIAGNOSIS — Z91.81 AT RISK FOR FALLS: ICD-10-CM

## 2023-01-24 DIAGNOSIS — F41.9 ANXIETY: ICD-10-CM

## 2023-01-24 DIAGNOSIS — F33.41 RECURRENT MAJOR DEPRESSIVE DISORDER, IN PARTIAL REMISSION (HCC): ICD-10-CM

## 2023-01-24 DIAGNOSIS — Q21.12 PFO (PATENT FORAMEN OVALE): ICD-10-CM

## 2023-01-24 DIAGNOSIS — M17.11 PRIMARY OSTEOARTHRITIS OF RIGHT KNEE: ICD-10-CM

## 2023-01-24 DIAGNOSIS — R25.1 TREMOR: ICD-10-CM

## 2023-01-24 DIAGNOSIS — Z00.00 ENCOUNTER FOR MEDICARE ANNUAL WELLNESS EXAM: Primary | ICD-10-CM

## 2023-01-24 DIAGNOSIS — E66.01 CLASS 2 SEVERE OBESITY DUE TO EXCESS CALORIES WITH SERIOUS COMORBIDITY AND BODY MASS INDEX (BMI) OF 35.0 TO 35.9 IN ADULT (HCC): ICD-10-CM

## 2023-01-24 DIAGNOSIS — G44.89 OTHER HEADACHE SYNDROME: ICD-10-CM

## 2023-01-24 PROBLEM — E66.812 CLASS 2 SEVERE OBESITY DUE TO EXCESS CALORIES WITH SERIOUS COMORBIDITY AND BODY MASS INDEX (BMI) OF 35.0 TO 35.9 IN ADULT (HCC): Status: ACTIVE | Noted: 2023-01-24

## 2023-02-09 ENCOUNTER — TELEPHONE (OUTPATIENT)
Dept: INTERNAL MEDICINE CLINIC | Facility: CLINIC | Age: 68
End: 2023-02-09

## 2023-02-09 NOTE — TELEPHONE ENCOUNTER
[id number] - [place], [place] - [address], [phone], [phone]     Additional Information    Associated Reports   View Encounter   Priority and Order Details     Outpatient Medication Detail     Disp Refills Start End    omeprazole 20 MG Oral Capsule Delayed Release 90 capsule 1 11/10/2022     Sig - Route:  Take 1 capsule (20 mg total) by mouth before breakfast. - Oral    Sent to pharmacy as: Omeprazole 20 MG Oral Capsule Delayed Release (PriLOSEC)    E-Prescribing Status: Receipt confirmed by pharmacy (11/10/2022 10:35 AM CST)      Patient has refills remaining

## 2023-04-03 ENCOUNTER — OFFICE VISIT (OUTPATIENT)
Dept: INTERNAL MEDICINE CLINIC | Facility: CLINIC | Age: 68
End: 2023-04-03

## 2023-04-03 ENCOUNTER — NURSE TRIAGE (OUTPATIENT)
Dept: INTERNAL MEDICINE CLINIC | Facility: CLINIC | Age: 68
End: 2023-04-03

## 2023-04-03 VITALS
HEART RATE: 76 BPM | HEIGHT: 63.5 IN | DIASTOLIC BLOOD PRESSURE: 75 MMHG | BODY MASS INDEX: 35.52 KG/M2 | WEIGHT: 203 LBS | OXYGEN SATURATION: 97 % | SYSTOLIC BLOOD PRESSURE: 126 MMHG

## 2023-04-03 DIAGNOSIS — J04.0 LARYNGITIS: Primary | ICD-10-CM

## 2023-04-03 DIAGNOSIS — R05.1 ACUTE COUGH: ICD-10-CM

## 2023-04-03 DIAGNOSIS — J01.40 ACUTE PANSINUSITIS, RECURRENCE NOT SPECIFIED: ICD-10-CM

## 2023-04-03 PROCEDURE — 3078F DIAST BP <80 MM HG: CPT | Performed by: NURSE PRACTITIONER

## 2023-04-03 PROCEDURE — 3074F SYST BP LT 130 MM HG: CPT | Performed by: NURSE PRACTITIONER

## 2023-04-03 PROCEDURE — 3008F BODY MASS INDEX DOCD: CPT | Performed by: NURSE PRACTITIONER

## 2023-04-03 PROCEDURE — 99213 OFFICE O/P EST LOW 20 MIN: CPT | Performed by: NURSE PRACTITIONER

## 2023-04-03 PROCEDURE — 1125F AMNT PAIN NOTED PAIN PRSNT: CPT | Performed by: NURSE PRACTITIONER

## 2023-04-03 RX ORDER — ALBUTEROL SULFATE 90 UG/1
2 AEROSOL, METERED RESPIRATORY (INHALATION) EVERY 4 HOURS PRN
Qty: 18 G | Refills: 1 | Status: SHIPPED | OUTPATIENT
Start: 2023-04-03

## 2023-04-03 RX ORDER — DOXYCYCLINE HYCLATE 100 MG/1
100 CAPSULE ORAL 2 TIMES DAILY
Qty: 14 CAPSULE | Refills: 0 | Status: SHIPPED | OUTPATIENT
Start: 2023-04-03 | End: 2023-04-10

## 2023-04-03 RX ORDER — BENZONATATE 200 MG/1
200 CAPSULE ORAL 3 TIMES DAILY PRN
Qty: 30 CAPSULE | Refills: 0 | Status: SHIPPED | OUTPATIENT
Start: 2023-04-03

## 2023-08-18 ENCOUNTER — TELEPHONE (OUTPATIENT)
Dept: INTERNAL MEDICINE CLINIC | Facility: CLINIC | Age: 68
End: 2023-08-18

## 2023-08-18 RX ORDER — EPINEPHRINE 0.3 MG/.3ML
0.3 INJECTION SUBCUTANEOUS ONCE
Qty: 1 EACH | Refills: 0 | Status: SHIPPED | OUTPATIENT
Start: 2023-08-18 | End: 2023-08-18

## 2023-08-18 RX ORDER — EPINEPHRINE 0.3 MG/.3ML
0.3 INJECTION SUBCUTANEOUS ONCE
Status: CANCELLED | OUTPATIENT
Start: 2023-08-18 | End: 2023-08-18

## 2023-08-18 NOTE — TELEPHONE ENCOUNTER
Spoke to patient, verified . Informed patient that doctor sent epipen to [facility] pharmacy. Patient verbalized understanding.

## 2023-08-18 NOTE — TELEPHONE ENCOUNTER
Patient calling to request an Epi pen states she has not had a refill for a very long time as she avoids areas with Bees and wasp. States she has  a wedding on Sunday and would like a refill.      Please advise

## 2023-08-24 ENCOUNTER — OFFICE VISIT (OUTPATIENT)
Dept: INTERNAL MEDICINE CLINIC | Facility: CLINIC | Age: 68
End: 2023-08-24

## 2023-08-24 ENCOUNTER — NURSE TRIAGE (OUTPATIENT)
Dept: INTERNAL MEDICINE CLINIC | Facility: CLINIC | Age: 68
End: 2023-08-24

## 2023-08-24 VITALS
SYSTOLIC BLOOD PRESSURE: 128 MMHG | DIASTOLIC BLOOD PRESSURE: 84 MMHG | OXYGEN SATURATION: 97 % | WEIGHT: 194.88 LBS | HEIGHT: 63.5 IN | HEART RATE: 99 BPM | BODY MASS INDEX: 34.1 KG/M2 | RESPIRATION RATE: 16 BRPM

## 2023-08-24 DIAGNOSIS — J01.90 ACUTE NON-RECURRENT SINUSITIS, UNSPECIFIED LOCATION: Primary | ICD-10-CM

## 2023-08-24 PROCEDURE — 1125F AMNT PAIN NOTED PAIN PRSNT: CPT | Performed by: INTERNAL MEDICINE

## 2023-08-24 PROCEDURE — 1159F MED LIST DOCD IN RCRD: CPT | Performed by: INTERNAL MEDICINE

## 2023-08-24 PROCEDURE — 3074F SYST BP LT 130 MM HG: CPT | Performed by: INTERNAL MEDICINE

## 2023-08-24 PROCEDURE — 3008F BODY MASS INDEX DOCD: CPT | Performed by: INTERNAL MEDICINE

## 2023-08-24 PROCEDURE — 1160F RVW MEDS BY RX/DR IN RCRD: CPT | Performed by: INTERNAL MEDICINE

## 2023-08-24 PROCEDURE — 99213 OFFICE O/P EST LOW 20 MIN: CPT | Performed by: INTERNAL MEDICINE

## 2023-08-24 PROCEDURE — 3079F DIAST BP 80-89 MM HG: CPT | Performed by: INTERNAL MEDICINE

## 2023-08-24 RX ORDER — DOXYCYCLINE 100 MG/1
100 CAPSULE ORAL 2 TIMES DAILY
Qty: 14 CAPSULE | Refills: 0 | Status: SHIPPED | OUTPATIENT
Start: 2023-08-24 | End: 2023-08-31

## 2023-08-24 RX ORDER — EPINEPHRINE 0.3 MG/.3ML
0.3 INJECTION SUBCUTANEOUS ONCE
COMMUNITY
Start: 2023-08-18

## 2023-08-24 NOTE — TELEPHONE ENCOUNTER
Please reply to pool: EM RN TRIAGE  Action Requested: Summary for Provider     []  Critical Lab, Recommendations Needed  [] Need Additional Advice  [x]   FYI    []   Need Orders  [] Need Medications Sent to Pharmacy  []  Other     SUMMARY: Patient called states symptoms started Thursday night. She vomited large amount of Mucous. Yesterday she had a 99.0 F temp, baseline is 97.0 F. Today she woke up and not feeling better, low-grade fever is still present. L ear pain --> 3/10. Sinus area hurts -->3/10 -- denies any tenderness, however some redness around cheeks and eyes. Nasal congestion and discharge is clear. Some hoarseness present. Throat is not sore and glands are swollen on both sides of neck. PND present. She has been drinking some tea and Honey. Has not taken a Covid test. Same day office visit offered --> agreeable and scheduled. Home Care Advice discussed, per protocol. Patient verbalized understanding. No further questions or concerns at this time.     Reason for call: Sinusitis  Onset: Thursday    Future Appointments   Date Time Provider Dayna Nj   8/24/2023  4:00 PM Yesika Burr MD [address]     Reason for Disposition   Redness or swelling on the cheek, forehead, or around the eye    Protocols used: Sinus Pain and Congestion-A-OH

## 2023-09-12 ENCOUNTER — TELEPHONE (OUTPATIENT)
Dept: INTERNAL MEDICINE CLINIC | Facility: CLINIC | Age: 68
End: 2023-09-12

## 2023-09-12 ENCOUNTER — OFFICE VISIT (OUTPATIENT)
Dept: INTERNAL MEDICINE CLINIC | Facility: CLINIC | Age: 68
End: 2023-09-12

## 2023-09-12 ENCOUNTER — HOSPITAL ENCOUNTER (OUTPATIENT)
Dept: GENERAL RADIOLOGY | Age: 68
Discharge: HOME | End: 2023-09-12
Attending: NURSE PRACTITIONER
Payer: MEDICARE

## 2023-09-12 ENCOUNTER — NURSE TRIAGE (OUTPATIENT)
Dept: INTERNAL MEDICINE CLINIC | Facility: CLINIC | Age: 68
End: 2023-09-12

## 2023-09-12 VITALS
DIASTOLIC BLOOD PRESSURE: 68 MMHG | HEART RATE: 76 BPM | SYSTOLIC BLOOD PRESSURE: 112 MMHG | BODY MASS INDEX: 34 KG/M2 | HEIGHT: 63.5 IN

## 2023-09-12 DIAGNOSIS — R05.9 COUGH, UNSPECIFIED TYPE: ICD-10-CM

## 2023-09-12 DIAGNOSIS — R05.9 COUGH, UNSPECIFIED TYPE: Primary | ICD-10-CM

## 2023-09-12 DIAGNOSIS — R09.82 POST-NASAL DRAINAGE: ICD-10-CM

## 2023-09-12 DIAGNOSIS — J04.0 LARYNGITIS: ICD-10-CM

## 2023-09-12 LAB
CONTROL LINE PRESENT WITH A CLEAR BACKGROUND (YES/NO): YES YES/NO
KIT LOT #: 6668 NUMERIC
STREP GRP A CUL-SCR: NEGATIVE

## 2023-09-12 PROCEDURE — 3078F DIAST BP <80 MM HG: CPT | Performed by: NURSE PRACTITIONER

## 2023-09-12 PROCEDURE — 1125F AMNT PAIN NOTED PAIN PRSNT: CPT | Performed by: NURSE PRACTITIONER

## 2023-09-12 PROCEDURE — 71046 X-RAY EXAM CHEST 2 VIEWS: CPT | Performed by: NURSE PRACTITIONER

## 2023-09-12 PROCEDURE — 87880 STREP A ASSAY W/OPTIC: CPT | Performed by: NURSE PRACTITIONER

## 2023-09-12 PROCEDURE — 1159F MED LIST DOCD IN RCRD: CPT | Performed by: NURSE PRACTITIONER

## 2023-09-12 PROCEDURE — 1160F RVW MEDS BY RX/DR IN RCRD: CPT | Performed by: NURSE PRACTITIONER

## 2023-09-12 PROCEDURE — 99214 OFFICE O/P EST MOD 30 MIN: CPT | Performed by: NURSE PRACTITIONER

## 2023-09-12 PROCEDURE — 3074F SYST BP LT 130 MM HG: CPT | Performed by: NURSE PRACTITIONER

## 2023-09-12 PROCEDURE — 3008F BODY MASS INDEX DOCD: CPT | Performed by: NURSE PRACTITIONER

## 2023-09-12 RX ORDER — ECHINACEA PURPUREA EXTRACT 125 MG
2 TABLET ORAL AS NEEDED
Qty: 60 ML | Refills: 0 | Status: SHIPPED | OUTPATIENT
Start: 2023-09-12

## 2023-09-12 RX ORDER — BENZONATATE 200 MG/1
200 CAPSULE ORAL 3 TIMES DAILY PRN
Qty: 30 CAPSULE | Refills: 0 | Status: SHIPPED | OUTPATIENT
Start: 2023-09-12

## 2023-09-12 RX ORDER — FLUTICASONE PROPIONATE 50 MCG
2 SPRAY, SUSPENSION (ML) NASAL DAILY
Qty: 15.8 ML | Refills: 0 | Status: SHIPPED | OUTPATIENT
Start: 2023-09-12 | End: 2024-09-06

## 2023-09-13 ENCOUNTER — TELEPHONE (OUTPATIENT)
Dept: PULMONOLOGY | Facility: CLINIC | Age: 68
End: 2023-09-13

## 2023-09-14 ENCOUNTER — OFFICE VISIT (OUTPATIENT)
Dept: OTOLARYNGOLOGY | Facility: CLINIC | Age: 68
End: 2023-09-14

## 2023-09-14 VITALS — BODY MASS INDEX: 34 KG/M2 | WEIGHT: 194 LBS

## 2023-09-14 DIAGNOSIS — A49.1: Primary | ICD-10-CM

## 2023-09-14 DIAGNOSIS — R49.0 DYSPHONIA: Primary | ICD-10-CM

## 2023-09-14 PROCEDURE — 1160F RVW MEDS BY RX/DR IN RCRD: CPT | Performed by: OTOLARYNGOLOGY

## 2023-09-14 PROCEDURE — 99203 OFFICE O/P NEW LOW 30 MIN: CPT | Performed by: OTOLARYNGOLOGY

## 2023-09-14 PROCEDURE — 1159F MED LIST DOCD IN RCRD: CPT | Performed by: OTOLARYNGOLOGY

## 2023-09-14 PROCEDURE — 31575 DIAGNOSTIC LARYNGOSCOPY: CPT | Performed by: OTOLARYNGOLOGY

## 2023-09-14 RX ORDER — DOXYCYCLINE HYCLATE 100 MG/1
100 CAPSULE ORAL 2 TIMES DAILY
Qty: 14 CAPSULE | Refills: 0 | Status: SHIPPED | OUTPATIENT
Start: 2023-09-14 | End: 2023-09-21

## 2023-09-15 RX ORDER — GLYCOPYRROLATE 1 MG/1
1 TABLET ORAL 3 TIMES DAILY
Qty: 90 TABLET | Refills: 1 | Status: SHIPPED | OUTPATIENT
Start: 2023-09-15

## 2023-09-15 RX ORDER — AZELASTINE 1 MG/ML
2 SPRAY, METERED NASAL 2 TIMES DAILY
Qty: 30 ML | Refills: 3 | Status: SHIPPED | OUTPATIENT
Start: 2023-09-15

## 2023-09-15 RX ORDER — LORATADINE 10 MG/1
10 TABLET ORAL DAILY
Qty: 30 TABLET | Refills: 3 | Status: SHIPPED | OUTPATIENT
Start: 2023-09-15

## 2023-09-15 RX ORDER — MONTELUKAST SODIUM 10 MG/1
10 TABLET ORAL NIGHTLY
Qty: 30 TABLET | Refills: 3 | Status: SHIPPED | OUTPATIENT
Start: 2023-09-15

## 2023-09-18 ENCOUNTER — TELEPHONE (OUTPATIENT)
Dept: INTERNAL MEDICINE CLINIC | Facility: CLINIC | Age: 68
End: 2023-09-18

## 2023-09-18 NOTE — TELEPHONE ENCOUNTER
Pt calling for her results and results reviewed and recommendations given. Pt requesting pertussis results and noted this lab was not completed. Pt states she thought it was a throat swab and advised it is a serum test.  Pt will go to lab to complete. Pt states she does not use my chart and if Cj REESE can have someone call her when those results are in,    SHRAVAN Thomas  9/18/2023  8:23 AM CDT Back to Top      Hello Jayleen,     Your throat culture is negative for Strep A, which confirms our negative result in the clinic (rapid test). However it is positive for Group B strep. This is typically treated with penicillin. Due to your multiple medication allergies, we would do another round of doxycycline (Rx sent to your pharmacy). I would encourage to take probiotics (activia yogurt, culturelle tablets available over the counter) while using antibiotics. I see you are scheduled with the pulmonologist on 10/03/2023, please keep that appt. I hope you are starting to feel better.  Thank you,     MARY ANN Moreno  Written by SHRAVAN Aleman on 9/14/2023 12:43 PM CDT

## 2023-09-21 ENCOUNTER — TELEPHONE (OUTPATIENT)
Dept: INTERNAL MEDICINE CLINIC | Facility: CLINIC | Age: 68
End: 2023-09-21

## 2023-09-21 DIAGNOSIS — R09.82 POST-NASAL DRAINAGE: ICD-10-CM

## 2023-09-21 DIAGNOSIS — Z09 FOLLOW-UP VISIT AFTER COMPLETION OF TREATMENT: ICD-10-CM

## 2023-09-21 DIAGNOSIS — J04.0 LARYNGITIS: Primary | ICD-10-CM

## 2023-09-21 NOTE — TELEPHONE ENCOUNTER
Spoke with the patient,verified full name and     Stated she will wait for pertussis testing, stated she has improve slightly, through the call patient coughing with voice comes and goes     She is on second round of antibiotics as Dr. Viet Brady changed the medications    Still needs referral for follow up with ENT    Pend for review/approval

## 2023-09-21 NOTE — TELEPHONE ENCOUNTER
SHRAVAN Canas  9/18/2023  8:23 AM CDT Back to Top      Dominic Clemens,     Your throat culture is negative for Strep A, which confirms our negative result in the clinic (rapid test). However it is positive for Group B strep. This is typically treated with penicillin. Due to your multiple medication allergies, we would do another round of doxycycline (Rx sent to your pharmacy). I would encourage to take probiotics (activia yogurt, culturelle tablets available over the counter) while using antibiotics. I see you are scheduled with the pulmonologist on 10/03/2023, please keep that appt. I hope you are starting to feel better. Thank you,     MARY ANN Lucio  Written by SHRAVAN Canas on 9/14/2023 12:43 PM CDT     Patient not active on mychart, advised of result above. Warren Hightower: Patient states she was not told to complete blood draw so she has not tested for Bordetella, she is asking if this would be necessary since she is already on an antibiotic for Strep B. Patient states she also needs referral for follow up visit with ENT. Pended for review.

## 2023-10-03 ENCOUNTER — OFFICE VISIT (OUTPATIENT)
Dept: PULMONOLOGY | Facility: CLINIC | Age: 68
End: 2023-10-03

## 2023-10-03 VITALS
DIASTOLIC BLOOD PRESSURE: 81 MMHG | HEART RATE: 83 BPM | OXYGEN SATURATION: 97 % | BODY MASS INDEX: 35.08 KG/M2 | HEIGHT: 63 IN | WEIGHT: 198 LBS | SYSTOLIC BLOOD PRESSURE: 129 MMHG

## 2023-10-03 DIAGNOSIS — R05.2 SUBACUTE COUGH: Primary | ICD-10-CM

## 2023-10-03 DIAGNOSIS — J32.9 PURULENT POSTNASAL DRAINAGE: ICD-10-CM

## 2023-10-03 DIAGNOSIS — J40 FREQUENT EPISODES OF BRONCHITIS: ICD-10-CM

## 2023-10-03 PROCEDURE — 1160F RVW MEDS BY RX/DR IN RCRD: CPT | Performed by: PHYSICIAN ASSISTANT

## 2023-10-03 PROCEDURE — 99203 OFFICE O/P NEW LOW 30 MIN: CPT | Performed by: PHYSICIAN ASSISTANT

## 2023-10-03 PROCEDURE — 3074F SYST BP LT 130 MM HG: CPT | Performed by: PHYSICIAN ASSISTANT

## 2023-10-03 PROCEDURE — 3079F DIAST BP 80-89 MM HG: CPT | Performed by: PHYSICIAN ASSISTANT

## 2023-10-03 PROCEDURE — 1159F MED LIST DOCD IN RCRD: CPT | Performed by: PHYSICIAN ASSISTANT

## 2023-10-03 PROCEDURE — 3008F BODY MASS INDEX DOCD: CPT | Performed by: PHYSICIAN ASSISTANT

## 2023-10-03 PROCEDURE — 1126F AMNT PAIN NOTED NONE PRSNT: CPT | Performed by: PHYSICIAN ASSISTANT

## 2023-10-03 RX ORDER — DOXYCYCLINE HYCLATE 100 MG/1
100 CAPSULE ORAL 2 TIMES DAILY
COMMUNITY

## 2023-10-03 RX ORDER — MULTIVIT-MIN/IRON FUM/FOLIC AC 7.5 MG-4
1 TABLET ORAL DAILY
COMMUNITY

## 2023-10-04 ENCOUNTER — TELEPHONE (OUTPATIENT)
Dept: INTERNAL MEDICINE CLINIC | Facility: CLINIC | Age: 68
End: 2023-10-04

## 2023-10-04 ENCOUNTER — TELEPHONE (OUTPATIENT)
Dept: PULMONOLOGY | Facility: CLINIC | Age: 68
End: 2023-10-04

## 2023-10-04 DIAGNOSIS — J40 BRONCHITIS, NOT SPECIFIED AS ACUTE OR CHRONIC: Primary | ICD-10-CM

## 2023-10-04 NOTE — TELEPHONE ENCOUNTER
Patient calling regarding referrals for ENT    Patient saw ENT Dr. Juan Macedo 9/14/23 next appointment 10/12/23  Informed following is in her chart  Referral dated 9/12/23 with 3 visits \"approved\"  Referral dated 9/21/23 with 3 visits  \"open\"

## 2023-10-05 NOTE — TELEPHONE ENCOUNTER
Manage care pt wants to ensure that prior auth is not required for PFT, pt states she called insurance and was advised that it is required. Please advise

## 2023-10-12 ENCOUNTER — OFFICE VISIT (OUTPATIENT)
Dept: OTOLARYNGOLOGY | Facility: CLINIC | Age: 68
End: 2023-10-12

## 2023-10-12 DIAGNOSIS — R49.0 DYSPHONIA: Primary | ICD-10-CM

## 2023-10-12 PROCEDURE — 1160F RVW MEDS BY RX/DR IN RCRD: CPT | Performed by: OTOLARYNGOLOGY

## 2023-10-12 PROCEDURE — 1159F MED LIST DOCD IN RCRD: CPT | Performed by: OTOLARYNGOLOGY

## 2023-10-12 PROCEDURE — 99213 OFFICE O/P EST LOW 20 MIN: CPT | Performed by: OTOLARYNGOLOGY

## 2023-10-12 NOTE — PROGRESS NOTES
Alexandr Sommer is a 80year old female. Patient presents with: Follow - Up: Dysphonia reevaluation  Written rx requested if needed  AVS report requested via mailed  Pt would like to review medications      HISTORY OF PRESENT ILLNESS  He presents with a history of chronic throat issues over the past several weeks. She is on doxycycline and as she lost her voice almost completely. Does note nasal congestion and postnasal discharge denies any reflux. Has to sleep sitting up essentially to keep from choking on the postnasal discharge. No other significant allergy medications. Allergy history? No nasal mucopurulence at this time. 10/12/23 Last visit noted to have significant erythema of the laryngeal structures. She was started on Singulair loratadine Astelin nasal spray as well as glycopyrrolate. She is weaned herself off of all medications down to just glycopyrrolate once daily which seems to be helping with both her postnasal discharge her voice and her reflux. Noted to have a bad tooth that was eroded into the bone which was removed within days of starting her medications that I prescribed and she noted improvement in her symptoms throat symptoms since having this tooth removed.     Social History    Socioeconomic History      Marital status: Single    Tobacco Use      Smoking status: Never      Smokeless tobacco: Never    Vaping Use      Vaping Use: Never used    Substance and Sexual Activity      Alcohol use: No      Drug use: Never      Sexual activity: Not Currently    Other Topics      Concerns:        Pt has a pacemaker: No        Pt has a defibrillator: No        Reaction to local anesthetic: No        Caffeine Concern: Yes          4 cups of coffee/day        Exercise: Yes          water exercise      Family History   Problem Relation Age of Onset    Breast Cancer Sister 28    Breast Cancer Maternal Aunt 61    Breast Cancer Paternal Aunt 61       Past Medical History:   Diagnosis Date Bronchitis     Colon abnormality     spastic    Depression     Seizure disorder (HCC)     TIA (transient ischemic attack)     Tremor     Ulcer     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[facility] [address]        Past Surgical History:   Procedure Laterality Date    HYSTERECTOMY  1984    WRIST FRACTURE SURGERY      growth removed [address] [facility] Neg/Pos Details   Constitutional Negative Fatigue, fever and weight loss. ENMT Negative Drooling. Eyes Negative Blurred vision and vision changes. Respiratory Negative Dyspnea and wheezing. Cardio Negative Chest pain, irregular heartbeat/palpitations and syncope. GI Negative Abdominal pain and diarrhea. Endocrine Negative Cold intolerance and heat intolerance. Neuro Negative Tremors. Psych Negative Anxiety and depression. Integumentary Negative Frequent skin infections, pigment change and rash. Hema/Lymph Negative Easy bleeding and easy bruising. PHYSICAL EXAM    There were no vitals taken for this visit. Constitutional Normal Overall appearance - Normal.   Psychiatric Normal Orientation - Oriented to time, place, person & situation. Appropriate mood and affect. Neck Exam Normal Inspection - Normal. Palpation - Normal. Parotid gland - Normal. Thyroid gland - Normal.   Eyes Normal Conjunctiva - Right: Normal, Left: Normal. Pupil - Right: Normal, Left: Normal. Fundus - Right: Normal, Left: Normal.   Neurological Normal Memory - Normal. Cranial nerves - Cranial nerves II through XII grossly intact. Head/Face Normal Facial features - Normal. Eyebrows - Normal. Skull - Normal.        Nasopharynx Normal External nose - Normal. Lips/teeth/gums - Normal. Tonsils - Normal. Oropharynx - Normal.   Ears Normal Inspection - Right: Normal, Left: Normal. Canal - Right: Normal, Left: Normal. TM - Right: Normal, Left: Normal.   Skin Normal Inspection - Normal.        Lymph Detail Normal Submental. Submandibular.  Anterior cervical. Posterior cervical. Supraclavicular. Nose/Mouth/Throat Normal External nose - Normal. Lips/teeth/gums - Normal. Tonsils - Normal. Oropharynx - Normal.   Nose/Mouth/Throat Normal Nares - Right: Normal Left: Normal. Septum -Normal  Turbinates - Right: Normal, Left: Normal.       Current Outpatient Medications:     doxycycline 100 MG Oral Cap, Take 1 capsule (100 mg total) by mouth 2 (two) times daily. , Disp: , Rfl:     Multiple Vitamins-Minerals (MULTI-VITAMIN/MINERALS) Oral Tab, Take 1 tablet by mouth daily. , Disp: , Rfl:     fluticasone propionate 50 MCG/ACT Nasal Suspension, 2 sprays by Each Nare route daily. , Disp: 15.8 mL, Rfl: 0    EPINEPHrine 0.3 MG/0.3ML Injection Solution Auto-injector, Inject 0.3 mL (1 each total) into the skin one time. , Disp: , Rfl:     omeprazole 20 MG Oral Capsule Delayed Release, Take 1 capsule (20 mg total) by mouth before breakfast., Disp: 90 capsule, Rfl: 1    PROPRANOLOL 20 MG Oral Tab, TAKE 1 TABLET BY MOUTH TWICE A DAY, Disp: 180 tablet, Rfl: 0    cyclobenzaprine 10 MG Oral Tab, Take 1 tablet (10 mg total) by mouth 3 (three) times daily as needed for Muscle spasms. , Disp: 90 tablet, Rfl: 0    ibuprofen 200 MG Oral Tab, Take 1 tablet (200 mg total) by mouth every 6 (six) hours as needed. , Disp: , Rfl:     Calcium Carbonate Antacid 500 MG Oral Chew Tab, Chew 1 tablet (500 mg total) by mouth as needed. , Disp: , Rfl:     cetirizine 10 MG Oral Tab, Take 1 tablet (10 mg total) by mouth as needed. , Disp: , Rfl:     montelukast 10 MG Oral Tab, Take 1 tablet (10 mg total) by mouth nightly. (Patient not taking: Reported on 10/12/2023), Disp: 30 tablet, Rfl: 3    azelastine 0.1 % Nasal Solution, 2 sprays by Nasal route 2 (two) times daily. (Patient not taking: Reported on 10/12/2023), Disp: 30 mL, Rfl: 3    glycopyrrolate 1 MG Oral Tab, Take 1 tablet (1 mg total) by mouth 3 (three) times daily. (Patient not taking: Reported on 10/12/2023), Disp: 90 tablet, Rfl: 1  ASSESSMENT AND PLAN    1. Dysphonia  Down to just 1 tablet of glycopyrrolate and doing well. Wean to every other day and see if she can wean completely. Return to see me as needed in the future or for refills return to see me in a year. This note was prepared using [employer] [place] [employer] voice recognition dictation software. As a result errors may occur. When identified these errors have been corrected. While every attempt is made to correct errors during dictation discrepancies may still exist    Skyler Campos MD    10/12/2023    12:59 PM

## 2023-10-12 NOTE — TELEPHONE ENCOUNTER
Good Morning    Adrienne MA [facility] - [id number]    Called Adrienne at phone# [phone] & spoke to Veena     CPT 03931,54635,[id number] & [id number]    No prior auth needed for all CPT codes provided.      A referral is required with medical intake team. Phone# [phone] & spoke to Paulina EVANS# [phone]    PFT Testing - DOS 10/17/23    DX J40     Auth# [id number]    Valid  10/17/23 thru 11/15/23    Call ref# [id number]    Thank you    Darcy  [facility]     77 y/o male, with a PmHx of Parox Afib on Xarelto, CHF (Has a Life Vest), Peg Tube, CVA, BPH, HLD, HTN, Gout, presented with a c/o syncope with head trauma. Pt is being admitted to telemetry for r/o syncope. 77 y/o male, with a PmHx of Parox Afib on Xarelto, CHF (Has a Life Vest), Peg Tube, CVA, BPH, HLD, HTN, Gout, presented to the [facility] ED with a c/o syncope with head trauma. Pt states this afternoon he was walking to the bathroom and then doesn't remember what had happened. He was found on a carpeted floor by his son-in-law and nephew. Pt denies any fever, chills, chest pain, HA, dizziness, blurred vision, n/v, sob.  Pt has no complaints at this time. He appears comfortable at this time and has no recollection of what had happened. He is being admitted to telemetry for syncope.

## 2023-10-19 ENCOUNTER — HOSPITAL ENCOUNTER (OUTPATIENT)
Dept: RESPIRATORY THERAPY | Facility: HOSPITAL | Age: 68
Discharge: HOME | End: 2023-10-19
Attending: PHYSICIAN ASSISTANT
Payer: MEDICARE

## 2023-10-19 PROBLEM — J40 FREQUENT EPISODES OF BRONCHITIS: Status: ACTIVE | Noted: 2023-10-19

## 2023-10-19 PROCEDURE — 94726 PLETHYSMOGRAPHY LUNG VOLUMES: CPT | Performed by: INTERNAL MEDICINE

## 2023-10-19 PROCEDURE — 94729 DIFFUSING CAPACITY: CPT | Performed by: INTERNAL MEDICINE

## 2023-10-19 PROCEDURE — 94060 EVALUATION OF WHEEZING: CPT | Performed by: INTERNAL MEDICINE

## 2023-10-20 NOTE — PROCEDURES
[facility]     Pulmonary Function Test     Alexandr Sommer Patient Status:  Outpatient    1955 MRN [id number]   Date of Exam 10/19/23 PCP Mello Marcial MD           Spirometry   FEV1: 2.35 106%  FVC: 3.06 108%  FEV1/FVC: 0.77    Lung Volume   T.83 99%  RV : 1.74 84%    Diffusion Capacity   DLCO: 15.6 76%    Flow Volume Loop       Impression   No evidence of obstructive defect seen without significant postbronchodilator response observed. Normal lung volumes.   Mild reduction in diffusion capacity    Carroll Sero, [address]

## 2023-11-30 RX ORDER — IBUPROFEN 600 MG/1
600 TABLET ORAL EVERY 8 HOURS PRN
Qty: 180 TABLET | Refills: 3 | Status: SHIPPED | OUTPATIENT
Start: 2023-11-30

## 2023-11-30 RX ORDER — PROPRANOLOL HYDROCHLORIDE 20 MG/1
20 TABLET ORAL 2 TIMES DAILY
Qty: 180 TABLET | Refills: 3 | Status: SHIPPED | OUTPATIENT
Start: 2023-11-30

## 2023-11-30 RX ORDER — OMEPRAZOLE 20 MG/1
20 CAPSULE, DELAYED RELEASE ORAL
Qty: 90 CAPSULE | Refills: 3 | Status: SHIPPED | OUTPATIENT
Start: 2023-11-30

## 2023-11-30 NOTE — TELEPHONE ENCOUNTER
Pt would like a refill on her  Omeprazole, ibuprofen 600 MG (not listed)  and propanolol medication. Per the patient she is out of medication. Pt is requesting a 90 day supply and additional refills. [facility] [place] [facility])     Current Outpatient Medications   Medication Sig Dispense Refill    omeprazole 20 MG Oral Capsule Delayed Release Take 1 capsule (20 mg total) by mouth before breakfast. 90 capsule 1    PROPRANOLOL 20 MG Oral Tab TAKE 1 TABLET BY MOUTH TWICE A  tablet 0    ibuprofen 200 MG Oral Tab Take 1 tablet (200 mg total) by mouth every 6 (six) hours as needed.

## 2023-11-30 NOTE — TELEPHONE ENCOUNTER
Prescription pending. FYI Propranolol was last prescribed by Dr Phylicia Estrada and Ibuprofen was on past medication list last prescription was 1/8/2023 for 90 day supply. Ok to dispense?

## 2023-12-13 RX ORDER — MONTELUKAST SODIUM 10 MG/1
10 TABLET ORAL NIGHTLY
Qty: 90 TABLET | Refills: 1 | Status: SHIPPED | OUTPATIENT
Start: 2023-12-13

## 2024-01-09 NOTE — TELEPHONE ENCOUNTER
Pt was informed during the visit (and we had a long discussion regarding her symptoms and testing) that we would order testing for pertussis. At this point if she is improving we can hold off on testing.  If cough persists she should complete the test. Pt with original presentation to [facility] with L foot wound and cellulitis, transferred to [facility] for L angioplasty  - doppler with occlusion of the popliteal artery on the left - however patient has strong DP pulse, US likely not accurate per vascular - angioplasty cancelled  - L foot NM bone scan ordered to eval for OM, unable to obtain MRI due to metal in arm as per son  - s/p vanc/zosyn -> vanc/cefepime -> cefepime  - WBC trending downwards, now WNL  - afebrile, VS stable  - cont cefepime 1g q24 renal dosing  - will hold off on ordering Bcx as pt has completed several days of abx tx; consider Bcx if pt has new fever  - cont heparin gtt as INR subtherapeutic and pt with mechanical AVR, goal INR ~3  - will cont to dose Coumadin tonight  - cont minced and moist diet per  chart review  - ID consulted Dr. Orosco, recs appreciated  - Vascular consulted recs appreciated  - Cardio consulted for cardiac clearance recs appreciated Pt with original presentation to [facility] with L foot wound and cellulitis, transferred to [facility] for L angioplasty  - doppler with occlusion of the popliteal artery on the left - however patient has strong DP pulse, US likely not accurate per vascular - angioplasty cancelled  - L foot NM bone scan ordered to eval for OM, unable to obtain MRI due to metal in arm as per son  - s/p vanc/zosyn -> vanc/cefepime -> cefepime  - WBC trending downwards, now WNL  - afebrile, VS stable  - cont cefepime 1g q24 renal dosing  - will hold off on ordering Bcx as pt has completed several days of abx tx; consider Bcx if pt has new fever  - cont heparin gtt as INR subtherapeutic and pt with mechanical AVR, goal INR ~3  - will cont to dose Coumadin tonight  - cont minced and moist diet per  chart review  - ID consulted Dr. Orosco, recs appreciated  - Vascular consulted recs appreciated  - Cardio consulted for cardiac clearance recs appreciated Pt with original presentation to [facility] with L foot wound and cellulitis, transferred to [facility] for L angioplasty  - doppler with occlusion of the popliteal artery on the left - however patient has strong DP pulse, US likely not accurate per vascular - angioplasty cancelled  - L foot NM bone scan ordered to eval for OM, unable to obtain MRI due to metal in arm as per son  - s/p vanc/zosyn -> vanc/cefepime -> cefepime  - WBC trending downwards, now WNL  - afebrile, VS stable  - cont cefepime 1g q24 renal dosing  - will hold off on ordering Bcx as pt has completed several days of abx tx; consider Bcx if pt has new fever  - Will dc heparin gtt as inr is 2.3 and patient has had bouts of hematuria; ok per cardio  - will cont to dose Coumadin tonight  - cont minced and moist diet per  chart review  - ID consulted Dr. Orosco, recs appreciated  - Vascular consulted recs appreciated  - Cardio consulted for cardiac clearance recs appreciated Pt with original presentation to [facility] with L foot wound and cellulitis, transferred to [facility] for L angioplasty  - doppler with occlusion of the popliteal artery on the left - however patient has strong DP pulse, US likely not accurate per vascular - angioplasty cancelled  - L foot NM bone scan ordered to eval for OM, unable to obtain MRI due to metal in arm as per son  - s/p vanc/zosyn -> vanc/cefepime -> cefepime  - WBC trending downwards, now WNL  - afebrile, VS stable  - cont cefepime 1g q24 renal dosing  - will hold off on ordering Bcx as pt has completed several days of abx tx; consider Bcx if pt has new fever  - Will dc heparin gtt as inr is 2.3 and patient has had bouts of hematuria; ok per cardio  - will cont to dose Coumadin tonight  - cont minced and moist diet per  chart review  - ID consulted Dr. Orosco, recs appreciated  - Vascular consulted recs appreciated  - Cardio consulted for cardiac clearance recs appreciated Pt with original presentation to [facility] with L foot wound and cellulitis, transferred to [facility] for L angioplasty  - doppler with occlusion of the popliteal artery on the left - however patient has strong DP pulse, US likely not accurate per vascular - angioplasty cancelled  - L foot NM bone scan ordered to eval for OM, unable to obtain MRI due to metal in arm as per son  - s/p vanc/zosyn -> vanc/cefepime -> cefepime  - WBC trending downwards, now WNL  - afebrile, VS stable  - cont cefepime 1g q24 renal dosing  - will hold off on ordering Bcx as pt has completed several days of abx tx; consider Bcx if pt has new fever  - Will dc heparin gtt as inr is 2.3 and patient has had bouts of hematuria; per cardio, OK as INR >2 two days consecutively and also patient with hematuria  - will cont to dose Coumadin tonight  - cont minced and moist diet per  chart review  - ID consulted Dr. Orosco, recs appreciated  - Vascular consulted recs appreciated  - Cardio consulted for cardiac clearance recs appreciated Pt with original presentation to [facility] with L foot wound and cellulitis, transferred to [facility] for L angioplasty  - doppler with occlusion of the popliteal artery on the left - however patient has strong DP pulse, US likely not accurate per vascular - angioplasty cancelled  - L foot NM bone scan ordered to eval for OM, unable to obtain MRI due to metal in arm as per son  - s/p vanc/zosyn -> vanc/cefepime -> cefepime  - WBC trending downwards, now WNL  - afebrile, VS stable  - cont cefepime 1g q24 renal dosing  - will hold off on ordering Bcx as pt has completed several days of abx tx; consider Bcx if pt has new fever  - Will dc heparin gtt as inr is 2.3 and patient has had bouts of hematuria; per cardio, OK as INR >2 two days consecutively and also patient with hematuria  - will cont to dose Coumadin tonight  - cont minced and moist diet per  chart review  - ID consulted Dr. Orosco, recs appreciated  - Vascular consulted recs appreciated  - Cardio consulted for cardiac clearance recs appreciated Pt with original presentation to [facility] with L foot wound and cellulitis, transferred to [facility] for L angioplasty  - doppler with occlusion of the popliteal artery on the left - however patient has strong DP pulse, US likely not accurate per vascular - angioplasty cancelled  - L foot NM bone scan ordered to eval for OM, unable to obtain MRI due to metal in arm as per son  - s/p vanc/zosyn -> vanc/cefepime -> cefepime q12  - WBC trending downwards, now WNL  - afebrile, VS stable  - cont cefepime 1g q24 renal dosing  - will hold off on ordering Bcx as pt has completed several days of abx tx; consider Bcx if pt has new fever  - Will dc heparin gtt as inr is 2.3 and patient has had bouts of hematuria; per cardio, OK as INR >2 two days consecutively and also patient with hematuria  - will cont to dose Coumadin tonight  - cont minced and moist diet per  chart review  - ID consulted Dr. Orosco, recs appreciated  - Vascular consulted recs appreciated  - Cardio consulted for cardiac clearance recs appreciated Pt with original presentation to [facility] with L foot wound and cellulitis, transferred to [facility] for L angioplasty  - doppler with occlusion of the popliteal artery on the left - however patient has strong DP pulse, US likely not accurate per vascular - angioplasty cancelled  - L foot NM bone scan ordered to eval for OM, unable to obtain MRI due to metal in arm as per son  - s/p vanc/zosyn -> vanc/cefepime -> cefepime q12  - WBC trending downwards, now WNL  - afebrile, VS stable  - cont cefepime 1g q24 renal dosing  - will hold off on ordering Bcx as pt has completed several days of abx tx; consider Bcx if pt has new fever  - Will dc heparin gtt as inr is 2.3 and patient has had bouts of hematuria; per cardio, OK as INR >2 two days consecutively and also patient with hematuria  - will cont to dose Coumadin tonight  - cont minced and moist diet per  chart review  - ID consulted Dr. Orosco, recs appreciated  - Vascular consulted recs appreciated  - Cardio consulted for cardiac clearance recs appreciated

## 2024-01-31 ENCOUNTER — TELEPHONE (OUTPATIENT)
Dept: INTERNAL MEDICINE CLINIC | Facility: CLINIC | Age: 69
End: 2024-01-31

## 2024-03-13 ENCOUNTER — TELEPHONE (OUTPATIENT)
Dept: INTERNAL MEDICINE CLINIC | Facility: CLINIC | Age: 69
End: 2024-03-13

## 2024-03-13 NOTE — TELEPHONE ENCOUNTER
Patient is eligible for a 2024 Medicare Annual Wellness visit with PCP or NP.  This visit can be scheduled any time in the calendar year.   Left message to call back.

## 2024-04-08 ENCOUNTER — TELEPHONE (OUTPATIENT)
Dept: INTERNAL MEDICINE CLINIC | Facility: CLINIC | Age: 69
End: 2024-04-08

## 2024-06-07 ENCOUNTER — HOSPITAL ENCOUNTER (OUTPATIENT)
Age: 69
Discharge: HOME | End: 2024-06-07
Payer: MEDICARE

## 2024-06-07 ENCOUNTER — APPOINTMENT (OUTPATIENT)
Dept: GENERAL RADIOLOGY | Age: 69
End: 2024-06-07
Attending: NURSE PRACTITIONER
Payer: MEDICARE

## 2024-06-07 VITALS
TEMPERATURE: 99 F | OXYGEN SATURATION: 97 % | DIASTOLIC BLOOD PRESSURE: 79 MMHG | RESPIRATION RATE: 18 BRPM | HEART RATE: 89 BPM | SYSTOLIC BLOOD PRESSURE: 142 MMHG

## 2024-06-07 DIAGNOSIS — M79.672 PAIN OF LEFT HEEL: Primary | ICD-10-CM

## 2024-06-07 PROCEDURE — 73650 X-RAY EXAM OF HEEL: CPT | Performed by: NURSE PRACTITIONER

## 2024-06-07 PROCEDURE — 99214 OFFICE O/P EST MOD 30 MIN: CPT

## 2024-06-07 PROCEDURE — 73610 X-RAY EXAM OF ANKLE: CPT | Performed by: NURSE PRACTITIONER

## 2024-06-07 PROCEDURE — 99213 OFFICE O/P EST LOW 20 MIN: CPT

## 2024-06-07 NOTE — ED INITIAL ASSESSMENT (HPI)
Patient arrived ambulatory to room c/o left foot pain. Patient states she tripped on uneven pavement. Denies falling. Patient c/o pain to the foot and ankle. Worsens with movement.

## 2024-06-07 NOTE — ED PROVIDER NOTES
Patient Seen in: [facility] [place]      History     Chief Complaint   Patient presents with    Foot Pain     Stated Complaint: L foot injury    Subjective: This is a 69-year-old female, past medical history of seizure disorder, TIA, parkinsonian tremor, presents to immediate care with neighbor for evaluation of left lower extremity injury.  Patient states she tripped on uneven sidewalk around 4 PM but did not fall.  She is having pain to ankle and calcaneal area.  She does have previous ankle sprains and ligament injuries to this lower extremity.  8 out of 10 with weightbearing.  Took Motrin prior to arrival.  No obvious asymmetry or deformity.  No numbness or tingling.  GCS 15  The history is provided by the patient and a friend.           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[facility])              Past Surgical History:   Procedure Laterality Date    Hysterectomy  1984    Wrist fracture surgery      growth removed 1995                Social History     Socioeconomic History    Marital status: Single   Tobacco Use    Smoking status: Never    Smokeless tobacco: Never   Vaping Use    Vaping status: Never Used   Substance and Sexual Activity    Alcohol use: No    Drug use: Never    Sexual activity: Not Currently   Other Topics Concern    Pt has a pacemaker No    Pt has a defibrillator No    Reaction to local anesthetic No    Caffeine Concern Yes     Comment: 4 cups of coffee/day    Exercise Yes     Comment: water exercise              Review of Systems   Musculoskeletal:  Positive for arthralgias, gait problem, joint swelling and myalgias.   Neurological:  Negative for dizziness, tremors, facial asymmetry, weakness, light-headedness, numbness and headaches.       Positive for stated complaint: L foot injury  Other systems are as noted in  HPI.  Constitutional and vital signs reviewed.      All other systems reviewed and negative except as noted above.    Physical Exam     ED Triage Vitals [06/07/24 1734]   /79   Pulse 89   Resp 18   Temp 99 °F (37.2 °C)   Temp src    SpO2 97 %   O2 Device None (Room air)       Current Vitals:   Vital Signs  BP: 142/79  Pulse: 89  Resp: 18  Temp: 99 °F (37.2 °C)    Oxygen Therapy  SpO2: 97 %  O2 Device: None (Room air)            Physical Exam  Constitutional:       Appearance: Normal appearance. She is not ill-appearing or toxic-appearing.   Musculoskeletal:         General: Tenderness present. No swelling, deformity or signs of injury.      Left ankle: No swelling, deformity, ecchymosis or lacerations. Tenderness present over the lateral malleolus, medial malleolus and posterior TF ligament.      Left Achilles Tendon: Tenderness present. No defects. Aviles's test negative.        Legs:    Skin:     General: Skin is warm and dry.      Capillary Refill: Capillary refill takes less than 2 seconds.   Neurological:      General: No focal deficit present.      Mental Status: She is alert and oriented to person, place, and time.               ED Course   Labs Reviewed - No data to display  XR ANKLE (MIN 3 VIEWS), LEFT (CPT=73610)    Result Date: 6/7/2024  CONCLUSION: No acute fracture or dislocation.    Dictated by (CST): Neo Mckee MD on 6/07/2024 at 6:24 PM     Finalized by (CST): Neo Mckee MD on 6/07/2024 at 6:25 PM          XR HEEL (CALCANEUS) (MIN 2 VIEWS), LEFT (CPT=73650)    Result Date: 6/7/2024  CONCLUSION:   No acute fracture or dislocation.    Dictated by (CST): Neo Mckee MD on 6/07/2024 at 6:24 PM     Finalized by (CST): Neo Mckee MD on 6/07/2024 at 6:24 PM                          MDM        Differentials considered include: Calcaneal fracture, malleolus fracture, ligament injury/high ankle sprain, Achilles tendon injury, tendinitis.    This is a 69-year-old female,  presents to immediate care after tripping on uneven sidewalk.  Patient has previously injured lower extremity before with ligament injuries but never complete tear.  Patient is complaining of pain to ankle and heel as well as Achilles.    No obvious asymmetry or formation exam.  Patient ambulatory to immediate care without difficulty.    No pain to dorsal aspect of foot.  Patient has minimal pain to medial malleolus.  However, positive calcaneal pain and positive Achilles pain.  Negative Aviles test.  There is no swelling or ecchymosis over Achilles tendon.  Unlikely Achilles tendon injury.  However, cannot completely rule out Achilles tendinitis.    X-rays obtained of ankle and heel.  No acute fracture on independent or potation of x-ray.  Patient is aware of findings.  She does have a walking boot at home which she prefers to wear over any ankle stirrup.  However, requesting new Ace bandage wrap.  Patient is aware of RICE therapy.  She does have a walker at home, encourage patient to ambulate with walker for the next 2 weeks.    Patient was given the name of a podiatrist that she can follow-up with for further evaluation.  Patient was                             Medical Decision Making  Amount and/or Complexity of Data Reviewed  Radiology: ordered and independent interpretation performed. Decision-making details documented in ED Course.        Disposition and Plan     Clinical Impression:  1. Pain of left heel         Disposition:  Discharge  6/7/2024  6:30 pm    Follow-up:  Wendy Kenney DPM  [address],  [place] [address]  [phone]    Call in 7 days  This is a podiatrist that I would like you to follow-up with for further evaluation    Jill Coker MD  [address] [place] [address] [phone]      As needed          Medications Prescribed:  Current Discharge Medication List

## 2024-06-07 NOTE — DISCHARGE INSTRUCTIONS
As discussed, x-rays of ankle and heel are without evidence of fracture.  Likely ligament injury versus sprain versus tendinitis.  You may wear orthopedic device for the next 7 to 10 days.  Please ambulate with walker.  RICE therapy: Rest, ice, compression, elevation.  Tylenol and/or Motrin for any pain alleviation.    I have given you the name of a podiatrist that I would like you to follow-up with for further evaluation.  You may also follow-up with your previous orthopedic specialist if desired.

## 2024-06-10 ENCOUNTER — TELEPHONE (OUTPATIENT)
Dept: INTERNAL MEDICINE CLINIC | Facility: CLINIC | Age: 69
End: 2024-06-10

## 2024-06-10 ENCOUNTER — OFFICE VISIT (OUTPATIENT)
Dept: INTERNAL MEDICINE CLINIC | Facility: CLINIC | Age: 69
End: 2024-06-10

## 2024-06-10 VITALS
HEART RATE: 73 BPM | RESPIRATION RATE: 18 BRPM | DIASTOLIC BLOOD PRESSURE: 82 MMHG | SYSTOLIC BLOOD PRESSURE: 146 MMHG | WEIGHT: 202 LBS | BODY MASS INDEX: 36 KG/M2

## 2024-06-10 DIAGNOSIS — F33.41 RECURRENT MAJOR DEPRESSIVE DISORDER, IN PARTIAL REMISSION (HCC): ICD-10-CM

## 2024-06-10 DIAGNOSIS — G21.8 OTHER SECONDARY PARKINSONISM (HCC): ICD-10-CM

## 2024-06-10 DIAGNOSIS — G89.29 CHRONIC PAIN OF BOTH KNEES: ICD-10-CM

## 2024-06-10 DIAGNOSIS — M25.562 CHRONIC PAIN OF BOTH KNEES: ICD-10-CM

## 2024-06-10 DIAGNOSIS — R25.1 TREMOR: ICD-10-CM

## 2024-06-10 DIAGNOSIS — M25.561 CHRONIC PAIN OF BOTH KNEES: ICD-10-CM

## 2024-06-10 DIAGNOSIS — M25.572 ACUTE LEFT ANKLE PAIN: Primary | ICD-10-CM

## 2024-06-10 DIAGNOSIS — R21 RASH AND OTHER NONSPECIFIC SKIN ERUPTION: ICD-10-CM

## 2024-06-10 DIAGNOSIS — M25.50 ARTHRALGIA OF MULTIPLE JOINTS: ICD-10-CM

## 2024-06-10 PROCEDURE — 1159F MED LIST DOCD IN RCRD: CPT | Performed by: INTERNAL MEDICINE

## 2024-06-10 PROCEDURE — 3079F DIAST BP 80-89 MM HG: CPT | Performed by: INTERNAL MEDICINE

## 2024-06-10 PROCEDURE — 3077F SYST BP >= 140 MM HG: CPT | Performed by: INTERNAL MEDICINE

## 2024-06-10 PROCEDURE — 99213 OFFICE O/P EST LOW 20 MIN: CPT | Performed by: INTERNAL MEDICINE

## 2024-06-10 PROCEDURE — 1125F AMNT PAIN NOTED PAIN PRSNT: CPT | Performed by: INTERNAL MEDICINE

## 2024-06-11 NOTE — TELEPHONE ENCOUNTER
I placed external referral for this patient to see a movement disorder clinic at [facility], please let me know if it is possible

## 2024-06-12 ENCOUNTER — TELEPHONE (OUTPATIENT)
Dept: ORTHOPEDICS CLINIC | Facility: CLINIC | Age: 69
End: 2024-06-12

## 2024-06-12 ENCOUNTER — TELEPHONE (OUTPATIENT)
Dept: INTERNAL MEDICINE CLINIC | Facility: CLINIC | Age: 69
End: 2024-06-12

## 2024-06-12 DIAGNOSIS — M25.562 PAIN IN BOTH KNEES, UNSPECIFIED CHRONICITY: Primary | ICD-10-CM

## 2024-06-12 DIAGNOSIS — M25.561 PAIN IN BOTH KNEES, UNSPECIFIED CHRONICITY: Primary | ICD-10-CM

## 2024-06-12 NOTE — TELEPHONE ENCOUNTER
Will need to know who patient is seeing or the NPI number for the facility to process the referral request.

## 2024-06-12 NOTE — TELEPHONE ENCOUNTER
Pt is seeing for nena knee pain. Please advise if imaging is needed.   Future Appointments   Date Time Provider Department Center   6/17/2024 10:20 AM Naya Childs MD [facility] [place]   6/28/2024  9:30 AM Wendy Kenney DPM [facility] [place]

## 2024-06-14 NOTE — PROGRESS NOTES
Subjective:     Patient ID: Jayleen Gillette is a 69 year old female.  Presents for follow-up of ankle injury and other concerns    HPI  Patient reports that 3 days ago when she was walking she stepped on uneven surface and twisted right ankle almost fell.  [place] a pop.  She was seen at immediate care center x-ray showed no fracture.  Ankle was swollen and very painful now it is slightly less edema still painful especially in the heel on putting pressure and walking.  She tried to wear a boot she had at home it is cumbersome causes instability.  She is unstable and feeling unbalanced a lot has tremors of the hand and hands and involuntary movements they are progressing.  She has not seen neurologist in a long time was flattening several times but never preceded.  She wants to be seen at movement disorder clinic for [facility] where she was evaluated once before.  She is not happy with our neurology team continues to be bothered by headaches.  Which are chronic in her case.  She does not drive long distances anymore.  She has been taking propranolol 10 mg twice a day to treat tremors medication somewhat helpful  Also feeling more depressed than previously, not suicidal, no he had suicidal attempt in the past.  Wants to see psychiatrist and looking for a physician who is a part of her Medicare plan.    Current Outpatient Medications   Medication Sig Dispense Refill    MONTELUKAST 10 MG Oral Tab TAKE 1 TABLET BY MOUTH EVERY DAY AT NIGHT 90 tablet 1    omeprazole 20 MG Oral Capsule Delayed Release Take 1 capsule (20 mg total) by mouth before breakfast. 90 capsule 3    propranolol 20 MG Oral Tab Take 1 tablet (20 mg total) by mouth 2 (two) times daily. 180 tablet 3    ibuprofen 600 MG Oral Tab Take 1 tablet (600 mg total) by mouth every 8 (eight) hours as needed for Pain. 180 tablet 3    doxycycline 100 MG Oral Cap Take 1 capsule (100 mg total) by mouth 2 (two) times daily.       Multiple Vitamins-Minerals (MULTI-VITAMIN/MINERALS) Oral Tab Take 1 tablet by mouth daily.      azelastine 0.1 % Nasal Solution 2 sprays by Nasal route 2 (two) times daily. 30 mL 3    glycopyrrolate 1 MG Oral Tab Take 1 tablet (1 mg total) by mouth 3 (three) times daily. 90 tablet 1    fluticasone propionate 50 MCG/ACT Nasal Suspension 2 sprays by Each Nare route daily. 15.8 mL 0    EPINEPHrine 0.3 MG/0.3ML Injection Solution Auto-injector Inject 0.3 mL (1 each total) into the skin one time.      cyclobenzaprine 10 MG Oral Tab Take 1 tablet (10 mg total) by mouth 3 (three) times daily as needed for Muscle spasms. 90 tablet 0    ibuprofen 200 MG Oral Tab Take 1 tablet (200 mg total) by mouth every 6 (six) hours as needed.      Calcium Carbonate Antacid 500 MG Oral Chew Tab Chew 1 tablet (500 mg total) by mouth as needed.      cetirizine 10 MG Oral Tab Take 1 tablet (10 mg total) by mouth as needed.       Allergies:  Allergies   Allergen Reactions    Acetaminophen HIVES     Other reaction(s): ACETAMINOPHEN    Erythromycin ANGIOEDEMA    Gentamicin HIVES     Other reaction(s): GENTAMICIN    Neomycin HIVES    Penicillins HIVES    Sulfa Antibiotics NAUSEA AND VOMITING    Adhesive Tape     Iodine (Topical) RASH     Other reaction(s): IODINE       Past Medical History:    Bronchitis    Colon abnormality    spastic    Depression    Seizure disorder (HCC)    TIA (transient ischemic attack)    Tremor    Ulcer    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HCC)      Past Surgical History:   Procedure Laterality Date    Hysterectomy  1984    Wrist fracture surgery      growth removed 1995      Family History   Problem Relation Age of Onset    Breast Cancer Sister 35    Breast Cancer Maternal Aunt 60    Breast Cancer Paternal Aunt 60      Social History:   Social History     Socioeconomic History    Marital status: Single   Tobacco Use    Smoking status: Never    Smokeless tobacco: Never   Vaping Use    Vaping status: Never Used    Substance and Sexual Activity    Alcohol use: No    Drug use: Never    Sexual activity: Not Currently   Other Topics Concern    Pt has a pacemaker No    Pt has a defibrillator No    Reaction to local anesthetic No    Caffeine Concern Yes     Comment: 4 cups of coffee/day    Exercise Yes     Comment: water exercise        /82 (BP Location: Left arm, Patient Position: Sitting, Cuff Size: large)   Pulse 73   Resp 18   Wt 202 lb (91.6 kg)   BMI 35.78 kg/m²    Physical Exam  Constitutional:       Appearance: Normal appearance.      Comments: Patient has difficulty walking putting pressure on the left heel   Eyes:      Extraocular Movements: Extraocular movements intact.      Pupils: Pupils are equal, round, and reactive to light.   Cardiovascular:      Rate and Rhythm: Normal rate and regular rhythm.   Musculoskeletal:      Cervical back: Normal range of motion and neck supple.   Skin:     General: Skin is warm.      Coloration: Skin is not jaundiced.   Neurological:      General: No focal deficit present.      Mental Status: She is alert and oriented to person, place, and time. Mental status is at baseline.      Motor: Tremor present.      Coordination: Coordination is intact.      Gait: Gait abnormal.      Comments: Tremor of the head and right hand observed during office visit   Psychiatric:         Mood and Affect: Mood normal.         Behavior: Behavior normal.         Thought Content: Thought content normal.         Judgment: Judgment normal.         Assessment & Plan:   1. Acute left ankle pain due to strain, possible ligamental injury see podiatry, wear ACE wrap for now keep leg elevated minimal walking until seen by specialist   2. Chronic pain of both knees see orthopedic specialist   3. Rash and other nonspecific skin eruption referred patient to see dermatology   4. Arthralgia of multiple joints patient will do labs inflammatory markers see rheumatology as well   5. Tremor    6. Recurrent major  depressive disorder, in partial remission (HCC) referred patient to [facility]   7. Other secondary parkinsonism ([facility]) neurology referral for [facility] advised patient to call clinic and see if they participate in her insurance plan and let us know     Asked patient to return for Medicare annual physical      Meds This Visit:  Requested Prescriptions      No prescriptions requested or ordered in this encounter       Imaging & Referrals:  PODIATRY - INTERNAL  ORTHOPEDIC - INTERNAL  DERM - INTERNAL  RHEUMATOLOGY - INTERNAL  NEURO - INTERNAL  NEURO - EXTERNAL

## 2024-06-17 ENCOUNTER — OFFICE VISIT (OUTPATIENT)
Dept: ORTHOPEDICS CLINIC | Facility: CLINIC | Age: 69
End: 2024-06-17
Payer: MEDICARE

## 2024-06-17 ENCOUNTER — TELEPHONE (OUTPATIENT)
Dept: ORTHOPEDICS CLINIC | Facility: CLINIC | Age: 69
End: 2024-06-17

## 2024-06-17 VITALS — BODY MASS INDEX: 35.79 KG/M2 | WEIGHT: 202 LBS | HEIGHT: 63 IN

## 2024-06-17 DIAGNOSIS — M17.11 PRIMARY OSTEOARTHRITIS OF RIGHT KNEE: Primary | ICD-10-CM

## 2024-06-17 RX ORDER — TRIAMCINOLONE ACETONIDE 40 MG/ML
40 INJECTION, SUSPENSION INTRA-ARTICULAR; INTRAMUSCULAR ONCE
Status: COMPLETED | OUTPATIENT
Start: 2024-06-17 | End: 2024-06-17

## 2024-06-17 RX ADMIN — TRIAMCINOLONE ACETONIDE 40 MG: 40 INJECTION, SUSPENSION INTRA-ARTICULAR; INTRAMUSCULAR at 11:18:00

## 2024-06-17 NOTE — PROGRESS NOTES
[facility] New Consult      Chief Complaint   Patient presents with    Knee Pain     INES KNEE PAIN;ONSET: approximately few years ago  -right knee worse than the left  -9 years ago right knee surgery        HPI:  The patient is a 69 year old female referred for orthopaedic consultation by Dr. Jill Coker with complaints of chronic right knee pain.  She relates a remote history of undergoing arthroscopy to this knee with good results.  Over the years, she has begun to struggle with increasing pains for what was reportedly arthritic change found at the time of surgery.  Localized to the anterior and medial aspect with resultant limp.  She feels there may have been some compensatory load to this right knee from her recent left foot and ankle injury which is slowly improving.    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 90 capsule 3    propranolol 20 MG Oral Tab Take 1 tablet (20 mg total) by mouth 2 (two) times daily. 180 tablet 3    ibuprofen 600 MG Oral Tab Take 1 tablet (600 mg total) by mouth every 8 (eight) hours as needed for Pain. 180 tablet 3    doxycycline 100 MG Oral Cap Take 1 capsule (100 mg total) by mouth 2 (two) times daily.      Multiple Vitamins-Minerals (MULTI-VITAMIN/MINERALS) Oral Tab Take 1 tablet by mouth daily.      azelastine 0.1 % Nasal Solution 2 sprays by Nasal route 2 (two) times daily. 30 mL 3    glycopyrrolate 1 MG Oral Tab Take 1 tablet (1  mg total) by mouth 3 (three) times daily. 90 tablet 1    fluticasone propionate 50 MCG/ACT Nasal Suspension 2 sprays by Each Nare route daily. 15.8 mL 0    EPINEPHrine 0.3 MG/0.3ML Injection Solution Auto-injector Inject 0.3 mL (1 each total) into the skin one time.      cyclobenzaprine 10 MG Oral Tab Take 1 tablet (10 mg total) by mouth 3 (three) times daily as needed for Muscle spasms. 90 tablet 0    ibuprofen 200 MG Oral Tab Take 1 tablet (200 mg total) by mouth every 6 (six) hours as needed.      Calcium Carbonate Antacid 500 MG Oral Chew Tab Chew 1 tablet (500 mg total) by mouth as needed.      cetirizine 10 MG Oral Tab Take 1 tablet (10 mg total) by mouth as needed.       Allergies   Allergen Reactions    Acetaminophen HIVES     Other reaction(s): ACETAMINOPHEN    Erythromycin ANGIOEDEMA    Gentamicin HIVES     Other reaction(s): GENTAMICIN    Neomycin HIVES    Penicillins HIVES    Sulfa Antibiotics NAUSEA AND VOMITING    Adhesive Tape     Iodine (Topical) RASH     Other reaction(s): IODINE     Family History   Problem Relation Age of Onset    Breast Cancer Sister 35    Breast Cancer Maternal Aunt 60    Breast Cancer Paternal Aunt 60     Social History     Occupational History    Not on file   Tobacco Use    Smoking status: Never    Smokeless tobacco: Never   Vaping Use    Vaping status: Never Used   Substance and Sexual Activity    Alcohol use: No    Drug use: Never    Sexual activity: Not Currently        ROS:  Complete ROS reviewed by me and non-contributory to the chief complaint except as mentioned above.    Physical Exam:    Ht 5' 3\" (1.6 m)   Wt 202 lb (91.6 kg)   BMI 35.78 kg/m²   Constitutional: Well developed, well nourished 69 year old female  Psychological: NAD, alert and appropriate  Respiratory: Breathing comfortably on room air with RR of 10-14  Cardiac: Palpable distal pulses with pink warm extremities distally  Right knee:  Inspection reveals no significant discoloration or deformity.   Palpation reveals no significant warmth or effusion.  Range of motion is adequate with trace flexion contracture and adequate flexion to at least 115 degrees.  Medial joint line is tender to palpation.  Collaterals are stable on varus valgus stress.  Lachman and posterior drawer are negative.  Popliteal space is nontender.  No significant distal edema is noted.  Neurovascular status is intact on sensory, motor and perfusion assessment distally.      Imaging: Previous imaging studies including 3 views of the right knee from a 10/8/2022 and an MRI of the right knee from 11/2/2022 confirms tricompartmental osteoarthritic changes with suggestion of complex tearing of the medial meniscus.      Assessment/Diagnoses:  Diagnoses and all orders for this visit:    Primary osteoarthritis of right knee  -     DRAIN/INJECT LARGE JOINT/BURSA  -     triamcinolone acetonide (Kenalog-40) 40 MG/ML injection 40 mg      Plan:  I reviewed imaging and exam findings with the patient.  The diagnosis is degenerative joint disease of the right knee.  Although this is likely been a chronic condition, her symptoms may have been flared by compensatory load to this right leg from a recent foot and ankle injury.  We discussed the etiology, natural history and treatment options in detail.  Treatments include activity modification, weight loss, anti-inflammatory use and possible injections.  In the long term, the patient's symptoms may progress and warrant consideration of total knee arthroplasty, but only if symptoms become severe.  For now, non-surgical treatment options are recommended.  We discussed the option for knee corticosteroid injection along with the potential risks, benefits and alternatives.  In light of progressive symptoms, the patient elects to proceed and gives written consent.  All questions were answered and the patient verbalized understanding and appreciation.  Follow-up if needed.    Knee Cortisone Injection Procedure:  After  discussing risks, benefits and alternatives to cortisone injection including but not limited to needle infection, steroid flare or failed improvement, the patient gave written and verbal consent to proceed.  Using meticulous sterile technique, I injected 40 mg of Kenalog mixed with 4 cc of 1% Xylocaine at the lateral patellofemoral joint of the right knee to minimal resistance.  The patient tolerated this well and a Band-Aid was applied.  Instructions were given to contact us if the patient experiences any adverse effects.      Naya Childs MD, [facility]  Orthopaedic Surgery   Sports Medicine/Knee and Shoulder  [facility]  t: [phone]  f: [phone]               This document was partially prepared using Dragon Medical voice recognition software.  Although every attempt is made to correct errors during dictation, discrepancies may still exist.

## 2024-06-17 NOTE — TELEPHONE ENCOUNTER
Pt is asking if she can be called if xray's are needed for any appt's because she does not use mychart and she have to get xray's approved from her ins.

## 2024-06-20 ENCOUNTER — TELEPHONE (OUTPATIENT)
Dept: INTERNAL MEDICINE CLINIC | Facility: CLINIC | Age: 69
End: 2024-06-20

## 2024-06-20 NOTE — TELEPHONE ENCOUNTER
LEVI Howard-- patient is on wait list for neuro providers.  Nurse navigators, please reach out to patient to assist with finding behavioral health provider. Thank you.    Spoke to patient (name and  of patient verified). She is on two wait lists for two different neurology providers: Dr. Gaines and Dr. Patricia Napoles  Referral for Dr. Gaines was approved, only has waiting list, no appointments available to schedule with.  Patient plans to wait to see which wait list comes through first and will request a new referral if needed.  Patient advised to call Centralized Services Referral & Prior-Authorization Department at [phone] with any questions, verbalizes understanding.  Has called 6 psychiatry offices and no one is taking new patients. Discussed nurse navigator behavioral health referral. Patient agreeable   Prefers all calls to home phone: [phone]

## 2024-06-24 ENCOUNTER — TELEPHONE (OUTPATIENT)
Age: 69
End: 2024-06-24

## 2024-06-24 NOTE — TELEPHONE ENCOUNTER
Dominic Clemens,  I am glad that we were able to connect today. Here are some therapy resources that may be a good fit for you. Please verify your insurance coverage with any providers that you may choose to call and schedule with directly. If distance is a concern, please inquire about virtual options. If there is anything else I can assist with, please give me a call at [phone]. If you need more immediate assistance, or assistance outside of business hours, please contact the [facility] 24/7 helpline at [phone].  Dina Mckinnon LCSW or Dulce Cunningham LCSW  [facility]  [address] [place] [address]  Phone: [phone]    Ken Mehta PsyD  [facility]  [employer]   [address]  [place] [address]  Phone: [phone]    Walter Graham PsyD or Serina Zuluaga PsyD, [facility]  [facility]  [address]  [address] [address]  Phone: [phone]    Gildardo Moya PsyD  [address] [place] [address], [address]  [place] [address]  Phone: [phone]      Alicia Lazar (she/her/hers)  Patient Care Navigator Mental Health   [facility]/Mental Health Division  [phone] or 24/7 help line: [phone]  [facility]

## 2024-06-28 ENCOUNTER — OFFICE VISIT (OUTPATIENT)
Dept: ORTHOPEDICS CLINIC | Facility: CLINIC | Age: 69
End: 2024-06-28

## 2024-06-28 VITALS — HEIGHT: 63 IN | WEIGHT: 202 LBS | BODY MASS INDEX: 35.79 KG/M2

## 2024-06-28 DIAGNOSIS — S93.402A SPRAIN OF LEFT ANKLE, UNSPECIFIED LIGAMENT, INITIAL ENCOUNTER: Primary | ICD-10-CM

## 2024-06-28 DIAGNOSIS — M25.476 EFFUSION OF SUBTALAR JOINT: ICD-10-CM

## 2024-06-28 DIAGNOSIS — R26.9 GAIT ABNORMALITY: ICD-10-CM

## 2024-06-28 DIAGNOSIS — M25.373 ANKLE INSTABILITY, UNSPECIFIED LATERALITY: ICD-10-CM

## 2024-06-28 DIAGNOSIS — G25.0 TREMOR, ESSENTIAL: ICD-10-CM

## 2024-06-28 DIAGNOSIS — E66.3 PATIENT OVERWEIGHT: ICD-10-CM

## 2024-06-28 PROCEDURE — 1160F RVW MEDS BY RX/DR IN RCRD: CPT | Performed by: PODIATRIST

## 2024-06-28 PROCEDURE — 99203 OFFICE O/P NEW LOW 30 MIN: CPT | Performed by: PODIATRIST

## 2024-06-28 PROCEDURE — 3008F BODY MASS INDEX DOCD: CPT | Performed by: PODIATRIST

## 2024-06-28 PROCEDURE — 1159F MED LIST DOCD IN RCRD: CPT | Performed by: PODIATRIST

## 2024-06-28 NOTE — PROGRESS NOTES
[facility] New Patient Note    CC:   Chief Complaint   Patient presents with    Ankle Pain     Left ankle pain; Onset:06/07/24  -patient fell and was seen at the immediate care   -patient had another fall 3/4 days   -she does notice some swelling in the left ankle   -has used the RICE method but has seen no improvement  -also has pain in her toes in her left foot       HPI: The patient is a 69 year old female who presents today with complaints of left ankle pain and instability    Fell and had UC visit - xray was negative  She had mistepped on uneven sidewalk  She is concerned about ST injury  Many many sprains of left ankle        Swelling.  No improvement with RICE     Hx of Parkinsons tremor, TIA,     Used boot from home on left but aggravated right knee      Past Medical History:    Bronchitis    Colon abnormality    spastic    Depression    Seizure disorder ([facility])    TIA (transient ischemic attack)    Tremor    Ulcer    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[facility])     Past Surgical History:   Procedure Laterality Date    Hysterectomy  1984    Wrist fracture surgery      growth removed 1995     Current Outpatient Medications   Medication Sig Dispense Refill    MONTELUKAST 10 MG Oral Tab TAKE 1 TABLET BY MOUTH EVERY DAY AT NIGHT 90 tablet 1    omeprazole 20 MG Oral Capsule Delayed Release Take 1 capsule (20 mg total) by mouth before breakfast. 90 capsule 3    propranolol 20 MG Oral Tab Take 1 tablet (20 mg total) by mouth 2 (two) times daily. 180 tablet 3    ibuprofen 600 MG Oral Tab Take 1 tablet (600 mg total) by mouth every 8 (eight) hours as needed for Pain. 180 tablet 3    doxycycline 100 MG Oral Cap Take 1 capsule (100 mg total) by mouth 2 (two) times daily.      Multiple Vitamins-Minerals (MULTI-VITAMIN/MINERALS) Oral Tab Take 1 tablet by mouth daily.      azelastine 0.1 % Nasal Solution 2 sprays by Nasal route 2 (two) times daily. 30 mL 3    glycopyrrolate 1 MG Oral Tab Take 1 tablet (1 mg total) by  mouth 3 (three) times daily. 90 tablet 1    fluticasone propionate 50 MCG/ACT Nasal Suspension 2 sprays by Each Nare route daily. 15.8 mL 0    EPINEPHrine 0.3 MG/0.3ML Injection Solution Auto-injector Inject 0.3 mL (1 each total) into the skin one time.      cyclobenzaprine 10 MG Oral Tab Take 1 tablet (10 mg total) by mouth 3 (three) times daily as needed for Muscle spasms. 90 tablet 0    ibuprofen 200 MG Oral Tab Take 1 tablet (200 mg total) by mouth every 6 (six) hours as needed.      Calcium Carbonate Antacid 500 MG Oral Chew Tab Chew 1 tablet (500 mg total) by mouth as needed.      cetirizine 10 MG Oral Tab Take 1 tablet (10 mg total) by mouth as needed.       Allergies   Allergen Reactions    Acetaminophen HIVES     Other reaction(s): ACETAMINOPHEN    Erythromycin ANGIOEDEMA    Gentamicin HIVES     Other reaction(s): GENTAMICIN    Latex HIVES and RASH    Neomycin HIVES    Penicillins HIVES    Sulfa Antibiotics NAUSEA AND VOMITING    Adhesive Tape     Corn-Related Products RASH    Iodine (Topical) RASH     Other reaction(s): IODINE     Family History   Problem Relation Age of Onset    Breast Cancer Sister 35    Breast Cancer Maternal Aunt 60    Breast Cancer Paternal Aunt 60     Social History     Occupational History    Not on file   Tobacco Use    Smoking status: Never    Smokeless tobacco: Never   Vaping Use    Vaping status: Never Used   Substance and Sexual Activity    Alcohol use: No    Drug use: Never    Sexual activity: Not Currently        ROS:  Complete ROS reviewed by me and non-contributory to the chief complaint except as mentioned above.    Physical Exam:    Ht 5' 3\" (1.6 m)   Wt 202 lb (91.6 kg)   BMI 35.78 kg/m²     Exam left LE  Pain with AJ dorsiflexion  Pain medial heel and with STJ motion  Mild swelling    Sensation is intact sharp versus dull.  She can feel light touch to the tips of the toes  Palpable pedal pulses.  Hair growth is present.  Skin is supple and feet are well perfused and  warm.    Strength is 5 out of 5 all muscle groups    Full range of motion and nonpainful motion of the ankle joint, subtalar joint, MP joints, and midfoot joints. (Pain with stj motion left)            Imaging:  3 views left ankle-ankle mortise clear     2 views left heel  Questionable fracture off medial subtalar joint, axial calcaneal view.  personally viewed, independently interpreted and radiology report read.      Assessment/Diagnoses:  Diagnoses and all orders for this visit:    Sprain of left ankle, unspecified ligament, initial encounter  -     [facility] MRI ANKLE, LEFT (CPT=73721); Future    Effusion of subtalar joint  -     z [facility] MRI ANKLE, LEFT (CPT=73721); Future    Ankle instability, unspecified laterality  -     z [facility] MRI ANKLE, LEFT (CPT=73721); Future    Tremor, essential    Patient overweight    Gait abnormality        Plan:  I reviewed imaging and exam findings with the patient.  Findings on xray concerning for a fracture of the heel.  Also she is still pretty painful 3 weeks after incident.  I am recommending MRI to further evaluate the ankle and subtalar joints  She has knee pain and also hx of falls and sprains of her ankle    MRI of the ankle is being ordered to help determine other pathology such as internal derangement, bone bruising, ligamentous damage, occult fracture, tendonitis, osteochondral lesions among others.  Follow up after MRI to go over results and formulate a plan.       Boot making her knee worse  Will wait on device until mri result  RICE espec rest and off foot since she cannot use boot with knee pain    Will call with results of MRI  Pt Not on my chart      Wendy Kenney DPM  [facility]      This document was partially prepared using Dragon Medical voice recognition software.

## 2024-07-01 ENCOUNTER — TELEPHONE (OUTPATIENT)
Dept: INTERNAL MEDICINE CLINIC | Facility: CLINIC | Age: 69
End: 2024-07-01

## 2024-07-01 NOTE — TELEPHONE ENCOUNTER
Patient would like order for MRI of left ankle faxed to [facility]. The patient does not have fax #. Please advise

## 2024-07-01 NOTE — TELEPHONE ENCOUNTER
Called patient and informed (as she was aware of) MRI ordered by TASHI Bishop .  Patient said [facility] has MRI order - no need to fax  Location of [facility]  Ph# [phone]  Informed to call back if needs MRI ordered from PCP

## 2024-07-05 ENCOUNTER — TELEPHONE (OUTPATIENT)
Dept: CASE MANAGEMENT | Age: 69
End: 2024-07-05

## 2024-07-05 DIAGNOSIS — S93.402A SPRAIN OF LEFT ANKLE, UNSPECIFIED LIGAMENT, INITIAL ENCOUNTER: Primary | ICD-10-CM

## 2024-07-05 DIAGNOSIS — M25.373 ANKLE INSTABILITY, UNSPECIFIED LATERALITY: ICD-10-CM

## 2024-07-05 NOTE — TELEPHONE ENCOUNTER
Patient called to check on status of MRI of ankle left.    The MRI order has not been released.     Please release order to generate a referral.    The patient is scheduled 7/10/24   [facility]  [address] [place] [address]   Phone [phone]  Fax [phone]    Thank you

## 2024-07-09 NOTE — TELEPHONE ENCOUNTER
Pt initially called wrong office. Spoke with [facility], they have the 6/28/24 [facility] order already scanned in. No new order needed.

## 2024-07-10 ENCOUNTER — TELEPHONE (OUTPATIENT)
Dept: CASE MANAGEMENT | Age: 69
End: 2024-07-10

## 2024-07-10 DIAGNOSIS — M25.572 ACUTE LEFT ANKLE PAIN: Primary | ICD-10-CM

## 2024-07-10 NOTE — TELEPHONE ENCOUNTER
Dr. Coker,     Patient called requesting referral to Dr. Kenney.    Pended referral please review diagnosis and sign off if you agree.    Thank you.  Karma Levin  [facility]

## 2024-07-11 ENCOUNTER — TELEPHONE (OUTPATIENT)
Dept: CASE MANAGEMENT | Age: 69
End: 2024-07-11

## 2024-07-11 DIAGNOSIS — F32.A DEPRESSION, UNSPECIFIED DEPRESSION TYPE: Primary | ICD-10-CM

## 2024-07-11 NOTE — TELEPHONE ENCOUNTER
Dr. Coker,     Patient is requesting an referral for an appointment today for depression, at [facility].       Thank you

## 2024-07-17 ENCOUNTER — OFFICE VISIT (OUTPATIENT)
Dept: ORTHOPEDICS CLINIC | Facility: CLINIC | Age: 69
End: 2024-07-17
Payer: MEDICARE

## 2024-07-17 DIAGNOSIS — M25.373 ANKLE INSTABILITY, UNSPECIFIED LATERALITY: ICD-10-CM

## 2024-07-17 DIAGNOSIS — R26.9 GAIT ABNORMALITY: ICD-10-CM

## 2024-07-17 DIAGNOSIS — M21.42 ACQUIRED PES PLANUS OF LEFT FOOT: ICD-10-CM

## 2024-07-17 DIAGNOSIS — S93.692D TEAR OF LEFT PLANTAR CALCANEONAVICULAR LIGAMENT, SUBSEQUENT ENCOUNTER: ICD-10-CM

## 2024-07-17 DIAGNOSIS — S93.499A PARTIAL TEAR OF LIGAMENT OF LATERAL ASPECT OF ANKLE: ICD-10-CM

## 2024-07-17 DIAGNOSIS — M95.8 OSTEOCHONDRAL DEFECT OF TALUS: ICD-10-CM

## 2024-07-17 DIAGNOSIS — M77.9 TENDONITIS: Primary | ICD-10-CM

## 2024-07-17 PROCEDURE — 1159F MED LIST DOCD IN RCRD: CPT | Performed by: PODIATRIST

## 2024-07-17 PROCEDURE — 1160F RVW MEDS BY RX/DR IN RCRD: CPT | Performed by: PODIATRIST

## 2024-07-17 PROCEDURE — 99214 OFFICE O/P EST MOD 30 MIN: CPT | Performed by: PODIATRIST

## 2024-07-17 NOTE — PROGRESS NOTES
[facility] Progress Note    Subjective:     Mrs Gillette is here to go over her MRI results and with continued discomfort.  The boot was not helpful because it aggravated her knee.        Objective:     Exam we see collapse of the left foot arch and ankle valgus which has recently developed.  Maintained arch on the right.  She does have tenderness about the lateral ankle and tenderness along the talonavicular joint and along the posterior tibial tendon.  Tendon is weak but it is intact with mild swelling.          Imaging: MRI left ankle        1. Nonvisualization compatible with anterior talofibular ligament tear. Mild posterior tibiofibular, posterior talofibular, and calcaneofibular ligament sprain.   2. Attenuated appearance of the superomedial portion of the spring ligament suggests chronic partial tear. Mild tibiospring ligament sprain. Suggestion of pes planus.   3. Intermediate grade interstitial/longitudinal tear of the Achilles tendon, superimposed upon severe tendinosis.   4. Mild posterior tibial tendinosis and tenosynovitis. Mild peroneus brevis tendinosis. Mild extensor digitorum common tenosynovitis.   5. 2 x 3 mm high-grade chondral defect at the medial talar dome versus focally pronounced cartilage loss. Other scattered mild degenerative osteoarthritic changes.   6. Partial effacement of the sinus tarsi due to trace joint fluid and a small ganglion cyst.   7. Mild plantar fasciitis with a plantar calcaneal spur.   8. Ganglion cysts at the dorsal neck of the talus, dorsal to the navicular medial cuneiform joint near the tibialis anterior tendon, medial to the calcaneocuboid joint, and along the tarsal tunnel.               Assessment/Plan:     Diagnoses and all orders for this visit:    Tendonitis  -     Physical Therapy Referral - External    Partial tear of ligament of lateral aspect of ankle  -     Physical Therapy Referral - External    Tear of left plantar calcaneonavicular ligament,  subsequent encounter  -     Physical Therapy Referral - External    Osteochondral defect of talus  -     Physical Therapy Referral - External    Acquired pes planus of left foot  -     Physical Therapy Referral - External      We discussed the findings on the MRI.  The chondral defect is likely old.  She has acquired injury involving flattening of the foot.  She has a tear of the spring ligament which supports the arch and also tenosynovitis of the posterior tibial tendon with PT tendon dysfunction this is all affecting her arch and she is at risk of further tear.  We discussed each of the findings and the significance.  Unfortunately she cannot be in the boot  We discussed surgical versus nonsurgical options.  We are opting for nonsurgical at this point and this will involve physical therapy as well as a ankle brace and good supportive new balance shoe that she has.  This is in and out of the house and no barefoot.  We fit her for an ankle brace today and long-term she may benefit from an AFO.    She does have an old orthotic and is going to bring that in at the next visit for me to check out  Next visit will be after PT                                Wendy Kenney DPM  [facility]    [employer]ann[employer] speech recognition software was used to prepare this note. If a word or phrase is confusing, it is likely do to a failure of recognition. Please contact me with any questions or clarifications.

## 2024-07-23 ENCOUNTER — HOSPITAL ENCOUNTER (OUTPATIENT)
Dept: GENERAL RADIOLOGY | Age: 69
Discharge: HOME | End: 2024-07-23
Attending: INTERNAL MEDICINE
Payer: MEDICARE

## 2024-07-23 ENCOUNTER — TELEPHONE (OUTPATIENT)
Dept: RHEUMATOLOGY | Facility: CLINIC | Age: 69
End: 2024-07-23

## 2024-07-23 ENCOUNTER — OFFICE VISIT (OUTPATIENT)
Dept: RHEUMATOLOGY | Facility: CLINIC | Age: 69
End: 2024-07-23

## 2024-07-23 ENCOUNTER — LAB ENCOUNTER (OUTPATIENT)
Dept: LAB | Age: 69
End: 2024-07-23
Attending: INTERNAL MEDICINE
Payer: MEDICARE

## 2024-07-23 VITALS — BODY MASS INDEX: 34 KG/M2 | HEIGHT: 65 IN

## 2024-07-23 DIAGNOSIS — M15.9 OSTEOARTHRITIS OF MULTIPLE JOINTS, UNSPECIFIED OSTEOARTHRITIS TYPE: ICD-10-CM

## 2024-07-23 DIAGNOSIS — R25.9 UNSPECIFIED ABNORMAL INVOLUNTARY MOVEMENTS: ICD-10-CM

## 2024-07-23 DIAGNOSIS — M25.50 POLYARTHRALGIA: ICD-10-CM

## 2024-07-23 DIAGNOSIS — M15.9 OSTEOARTHRITIS OF MULTIPLE JOINTS, UNSPECIFIED OSTEOARTHRITIS TYPE: Primary | ICD-10-CM

## 2024-07-23 LAB
ALT SERPL-CCNC: 20 U/L
AST SERPL-CCNC: 22 U/L (ref ?–34)
BASOPHILS # BLD AUTO: 0.05 X10(3) UL (ref 0–0.2)
BASOPHILS NFR BLD AUTO: 0.5 %
CREAT BLD-MCNC: 0.97 MG/DL
DEPRECATED RDW RBC AUTO: 43.5 FL (ref 35.1–46.3)
EGFRCR SERPLBLD CKD-EPI 2021: 64 ML/MIN/1.73M2 (ref 60–?)
EOSINOPHIL # BLD AUTO: 0.16 X10(3) UL (ref 0–0.7)
EOSINOPHIL NFR BLD AUTO: 1.5 %
ERYTHROCYTE [DISTWIDTH] IN BLOOD BY AUTOMATED COUNT: 13.3 % (ref 11–15)
HCT VFR BLD AUTO: 38.2 %
HGB BLD-MCNC: 12.6 G/DL
IMM GRANULOCYTES # BLD AUTO: 0.02 X10(3) UL (ref 0–1)
IMM GRANULOCYTES NFR BLD: 0.2 %
LYMPHOCYTES # BLD AUTO: 2.07 X10(3) UL (ref 1–4)
LYMPHOCYTES NFR BLD AUTO: 19.5 %
MCH RBC QN AUTO: 29.4 PG (ref 26–34)
MCHC RBC AUTO-ENTMCNC: 33 G/DL (ref 31–37)
MCV RBC AUTO: 89 FL
MONOCYTES # BLD AUTO: 1.03 X10(3) UL (ref 0.1–1)
MONOCYTES NFR BLD AUTO: 9.7 %
NEUTROPHILS # BLD AUTO: 7.27 X10 (3) UL (ref 1.5–7.7)
NEUTROPHILS # BLD AUTO: 7.27 X10(3) UL (ref 1.5–7.7)
NEUTROPHILS NFR BLD AUTO: 68.6 %
PLATELET # BLD AUTO: 273 10(3)UL (ref 150–450)
RBC # BLD AUTO: 4.29 X10(6)UL
RHEUMATOID FACT SERPL-ACNC: 5.1 IU/ML (ref ?–14)
WBC # BLD AUTO: 10.6 X10(3) UL (ref 4–11)

## 2024-07-23 PROCEDURE — 86431 RHEUMATOID FACTOR QUANT: CPT | Performed by: INTERNAL MEDICINE

## 2024-07-23 PROCEDURE — 1160F RVW MEDS BY RX/DR IN RCRD: CPT | Performed by: INTERNAL MEDICINE

## 2024-07-23 PROCEDURE — 99214 OFFICE O/P EST MOD 30 MIN: CPT | Performed by: INTERNAL MEDICINE

## 2024-07-23 PROCEDURE — 73130 X-RAY EXAM OF HAND: CPT | Performed by: INTERNAL MEDICINE

## 2024-07-23 PROCEDURE — 84460 ALANINE AMINO (ALT) (SGPT): CPT

## 2024-07-23 PROCEDURE — 1125F AMNT PAIN NOTED PAIN PRSNT: CPT | Performed by: INTERNAL MEDICINE

## 2024-07-23 PROCEDURE — 84450 TRANSFERASE (AST) (SGOT): CPT

## 2024-07-23 PROCEDURE — 36415 COLL VENOUS BLD VENIPUNCTURE: CPT

## 2024-07-23 PROCEDURE — G2211 COMPLEX E/M VISIT ADD ON: HCPCS | Performed by: INTERNAL MEDICINE

## 2024-07-23 PROCEDURE — 82565 ASSAY OF CREATININE: CPT

## 2024-07-23 PROCEDURE — 1159F MED LIST DOCD IN RCRD: CPT | Performed by: INTERNAL MEDICINE

## 2024-07-23 PROCEDURE — 85025 COMPLETE CBC W/AUTO DIFF WBC: CPT

## 2024-07-23 PROCEDURE — 86200 CCP ANTIBODY: CPT | Performed by: INTERNAL MEDICINE

## 2024-07-23 NOTE — PROGRESS NOTES
RHEUMATOLOGY CLINIC- NEW PATIENT    Jayleen Gillette is a 69 year old female.    ASSESSMENT/PLAN:       ICD-10-CM    1. Osteoarthritis of multiple joints, unspecified osteoarthritis type  M15.9 AST (SGOT)     CBC With Differential With Platelet     Creatinine, Serum     ALT(SGPT)     Cyclic Citrullinate Pep. IGG     Rheumatoid Arthritis Factor     XR HAND (MIN 3 VIEWS), RIGHT (CPT=73130)     XR HAND (MIN 3 VIEWS), LEFT (CPT=73130)      2. Polyarthralgia  M25.50       3. Unspecified abnormal involuntary movements  R25.9 CBC With Differential With Platelet        DISCUSSION:  Patient presents as a new outpatient referral for polyarthralgias.  Prior imaging noted, as below, with degenerative, wear-and-tear changes.  No features at this time diagnostic of an underlying connective tissue disease nor inflammatory arthritis including no synovitis on exam.  Rather, I suspect her symptoms are more mechanical in etiology.  However, per patient request, will order additional autoimmune workup, as above as well as x-rays for independent review.  I did discuss possible Tylenol-arthritis and topical Voltaren, however, patient notes allergic reactions to these in the past.    PLAN:  -Patient to obtain the above nonfasting lab work including RA antibodies  - Will check bilateral hand x-rays for independent review  - Further treatment recommendations pending results.  If unremarkable, can consider CSI's.  - Consult/evaluation communicated with referring physician/provider.    I will contact patient on receipt of above results.  Follow-up as needed.    Otto Harris DO  7/23/2024   12:21 PM  There is a longitudinal care relationship with me, the care plan reflects the ongoing nature of the continuous relationship of care, and the medical record indicates that there is ongoing treatment of a serious/complex medical condition which I am currently managing.  is Applicable.         HPI:     7/23/2024 Initial Consult: I had the  pleasure of seeing Jayleen Gillette on 7/23/2024 as a new outpatient consultation for polyarthralgias. The patient was originally referred by Dr. Jill Coker.     69 year old female w/ PMH depression, essential tremor, tension type headaches, endometriosis, PUD who presents to clinic today. Patient reports polyarthralgias for several years including: Bilateral knee pain described as \"feels crunchy \", ankle, hips, left shoulder, lateral thumbs, wrists.  Symptoms are worse with movement and activity.  For example, her knee pain is worse with weightbearing such as climbing stairs.  Recently received a knee CSI with orthopedics that was helpful.  Also has been treating with as needed ibuprofen that is slightly helpful as well but noted with occasional stomach upset.      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 90 capsule 3    propranolol 20 MG Oral Tab Take 1 tablet (20 mg total) by mouth 2 (two) times daily. 180 tablet 3    ibuprofen 600 MG Oral Tab Take 1 tablet (600 mg total) by mouth every 8 (eight) hours as needed for Pain. 180 tablet 3    Multiple Vitamins-Minerals (MULTI-VITAMIN/MINERALS) Oral Tab Take 1 tablet by mouth daily.      cyclobenzaprine 10 MG Oral Tab Take 1 tablet (10 mg total) by mouth 3 (three) times daily as needed for Muscle spasms. 90 tablet 0    Calcium Carbonate Antacid 500 MG Oral  Chew Tab Chew 1 tablet (500 mg total) by mouth as needed.      cetirizine 10 MG Oral Tab Take 1 tablet (10 mg total) by mouth as needed.      MONTELUKAST 10 MG Oral Tab TAKE 1 TABLET BY MOUTH EVERY DAY AT NIGHT (Patient not taking: Reported on 7/17/2024) 90 tablet 1    doxycycline 100 MG Oral Cap Take 1 capsule (100 mg total) by mouth 2 (two) times daily. (Patient not taking: Reported on 7/23/2024)      azelastine 0.1 % Nasal Solution 2 sprays by Nasal route 2 (two) times daily. (Patient not taking: Reported on 7/17/2024) 30 mL 3    glycopyrrolate 1 MG Oral Tab Take 1 tablet (1 mg total) by mouth 3 (three) times daily. (Patient not taking: Reported on 7/17/2024) 90 tablet 1    fluticasone propionate 50 MCG/ACT Nasal Suspension 2 sprays by Each Nare route daily. (Patient not taking: Reported on 7/17/2024) 15.8 mL 0    EPINEPHrine 0.3 MG/0.3ML Injection Solution Auto-injector Inject 0.3 mL (1 each total) into the skin one time.      ibuprofen 200 MG Oral Tab Take 1 tablet (200 mg total) by mouth every 6 (six) hours as needed. (Patient not taking: Reported on 7/23/2024)         Allergies:  Allergies   Allergen Reactions    Acetaminophen HIVES     Other reaction(s): ACETAMINOPHEN    Erythromycin ANGIOEDEMA    Gentamicin HIVES     Other reaction(s): GENTAMICIN    Latex HIVES and RASH    Neomycin HIVES    Penicillins HIVES    Sulfa Antibiotics NAUSEA AND VOMITING    Adhesive Tape     Corn-Related Products RASH    Iodine (Topical) RASH     Other reaction(s): IODINE         ROS:   Review of Systems   Constitutional:  Negative for chills and fever.   HENT:  Negative for congestion, hearing loss, mouth sores, nosebleeds and trouble swallowing.    Eyes:  Negative for photophobia, pain, redness and visual disturbance.   Respiratory:  Negative for cough and shortness of breath.    Cardiovascular:  Negative for chest pain, palpitations and leg swelling.   Gastrointestinal:  Negative for abdominal pain, blood in stool, diarrhea  and nausea.   Endocrine: Negative for cold intolerance and heat intolerance.   Genitourinary:  Negative for dysuria, frequency and hematuria.   Musculoskeletal:  Positive for arthralgias and gait problem. Negative for back pain, joint swelling, myalgias, neck pain and neck stiffness.   Skin:  Negative for color change and pallor.   Neurological:  Negative for dizziness, weakness, numbness and headaches.   Psychiatric/Behavioral:  Negative for confusion and dysphoric mood.         PHYSICAL EXAM:     Constitutional:  Well developed, Well nourished, No acute distress  HENT:  Normocephalic, Atraumatic, Bilateral external ears normal, Oropharynx moist, No oral exudates.  Neck: Normal range of motion, No tenderness, Supple, No stridor.  Eyes:  PERRL, EOMI, Conjunctiva normal, No discharge.  Respiratory:  Normal breath sounds, No respiratory distress, No wheezing.  Cardiovascular:  Normal heart rate, Normal rhythm, No murmurs, No rubs, No gallops.  GI:  Bowel sounds normal, Soft, No tenderness, No masses, No pulsatile masses.  : No CVA tenderness.  Musculoskeletal:  A comprehensive 28 count joint exam was done and was negative for swelling or tenderness except as noted. Inspections for misalignment, asymmetry, crepitation, defects, tenderness, masses, nodules, effusions, range of motion, and stability in the upper and lower extremities bilaterally are all normal unless noted.           Integument:  Warm, Dry, No erythema, No rash.  Lymphatic:  No lymphadenopathy noted.  Neurologic:  Alert & oriented x 3, Normal motor function, Normal sensory function, No focal deficits noted.  Psychiatric:  Affect normal, Judgment normal, Mood normal.    LABS:     Prior lab work reviewed and notable for:     10/13/2022  CMP with BUN 22, CR 0.92, LFTs not elevated, no gamma gap noted  CBC without cytopenias or leukocytosis  Imagin/10/24 L Ankle MRI:   IMPRESSION:   1. Nonvisualization compatible with anterior talofibular  ligament tear. Mild posterior tibiofibular, posterior talofibular, and calcaneofibular ligament sprain.   2. Attenuated appearance of the superomedial portion of the spring ligament suggests chronic partial tear. Mild tibiospring ligament sprain. Suggestion of pes planus.   3. Intermediate grade interstitial/longitudinal tear of the Achilles tendon, superimposed upon severe tendinosis.   4. Mild posterior tibial tendinosis and tenosynovitis. Mild peroneus brevis tendinosis. Mild extensor digitorum common tenosynovitis.   5. 2 x 3 mm high-grade chondral defect at the medial talar dome versus focally pronounced cartilage loss. Other scattered mild degenerative osteoarthritic changes.   6. Partial effacement of the sinus tarsi due to trace joint fluid and a small ganglion cyst.   7. Mild plantar fasciitis with a plantar calcaneal spur.   8. Ganglion cysts at the dorsal neck of the talus, dorsal to the navicular medial cuneiform joint near the tibialis anterior tendon, medial to the calcaneocuboid joint, and along the tarsal tunnel.     11/2/22 R Knee MRI:   Impression   CONCLUSION:     Tricompartmental osteoarthritis, moderate within the patellofemoral compartment.     Complex degenerative tear involving the posterior horn and body of the medial meniscus with radial tear posterior root.     Mild pes anserine bursitis with a 1 cm ganglion cyst extending superiorly from the pes anserine bursa encircling the MCL.

## 2024-07-23 NOTE — TELEPHONE ENCOUNTER
Per patient, She just saw Dr Harris today and she went [facility] to do the X-ray but her insurance told her that it needs a prior authorization otherwise they will not cover it.  Please advise.

## 2024-07-24 ENCOUNTER — TELEPHONE (OUTPATIENT)
Dept: RHEUMATOLOGY | Facility: CLINIC | Age: 69
End: 2024-07-24

## 2024-07-24 LAB — CCP IGG SERPL-ACNC: 0.8 U/ML (ref 0–6.9)

## 2024-07-24 NOTE — TELEPHONE ENCOUNTER
Called patient 7/24 during the afternoon to discuss reassuring lab work including negative RF/CCP.  Additionally, x-rays with degenerative, wear-and-tear changes.  No significant inflammatory changes.  Patient to contact office if interested in CSI.

## 2024-07-31 ENCOUNTER — OFFICE VISIT (OUTPATIENT)
Dept: INTERNAL MEDICINE CLINIC | Facility: CLINIC | Age: 69
End: 2024-07-31

## 2024-07-31 VITALS
BODY MASS INDEX: 34.86 KG/M2 | WEIGHT: 199.19 LBS | DIASTOLIC BLOOD PRESSURE: 78 MMHG | HEIGHT: 63.5 IN | HEART RATE: 67 BPM | OXYGEN SATURATION: 95 % | SYSTOLIC BLOOD PRESSURE: 113 MMHG

## 2024-07-31 DIAGNOSIS — F33.1 MODERATE EPISODE OF RECURRENT MAJOR DEPRESSIVE DISORDER (HCC): ICD-10-CM

## 2024-07-31 DIAGNOSIS — M25.50 POLYARTHRALGIA: ICD-10-CM

## 2024-07-31 DIAGNOSIS — R26.89 BALANCE PROBLEMS: ICD-10-CM

## 2024-07-31 DIAGNOSIS — Q21.12 PFO (PATENT FORAMEN OVALE) (HCC): ICD-10-CM

## 2024-07-31 DIAGNOSIS — Z12.31 BREAST CANCER SCREENING BY MAMMOGRAM: ICD-10-CM

## 2024-07-31 DIAGNOSIS — G96.9 TREMOR DUE TO DISORDER OF CNS: ICD-10-CM

## 2024-07-31 DIAGNOSIS — Z91.81 AT RISK FOR FALLS: ICD-10-CM

## 2024-07-31 DIAGNOSIS — E66.09 CLASS 1 OBESITY DUE TO EXCESS CALORIES WITHOUT SERIOUS COMORBIDITY WITH BODY MASS INDEX (BMI) OF 34.0 TO 34.9 IN ADULT: ICD-10-CM

## 2024-07-31 DIAGNOSIS — Z12.11 COLON CANCER SCREENING: ICD-10-CM

## 2024-07-31 DIAGNOSIS — F33.41 RECURRENT MAJOR DEPRESSIVE DISORDER, IN PARTIAL REMISSION (HCC): ICD-10-CM

## 2024-07-31 DIAGNOSIS — Z00.00 MEDICARE ANNUAL WELLNESS VISIT, SUBSEQUENT: Primary | ICD-10-CM

## 2024-07-31 DIAGNOSIS — R25.1 TREMOR DUE TO DISORDER OF CNS: ICD-10-CM

## 2024-07-31 PROCEDURE — 96160 PT-FOCUSED HLTH RISK ASSMT: CPT | Performed by: INTERNAL MEDICINE

## 2024-07-31 PROCEDURE — 3008F BODY MASS INDEX DOCD: CPT | Performed by: INTERNAL MEDICINE

## 2024-07-31 PROCEDURE — 3074F SYST BP LT 130 MM HG: CPT | Performed by: INTERNAL MEDICINE

## 2024-07-31 PROCEDURE — G0439 PPPS, SUBSEQ VISIT: HCPCS | Performed by: INTERNAL MEDICINE

## 2024-07-31 PROCEDURE — 3078F DIAST BP <80 MM HG: CPT | Performed by: INTERNAL MEDICINE

## 2024-07-31 PROCEDURE — 1125F AMNT PAIN NOTED PAIN PRSNT: CPT | Performed by: INTERNAL MEDICINE

## 2024-07-31 PROCEDURE — 99499 UNLISTED E&M SERVICE: CPT | Performed by: INTERNAL MEDICINE

## 2024-07-31 PROCEDURE — 1159F MED LIST DOCD IN RCRD: CPT | Performed by: INTERNAL MEDICINE

## 2024-07-31 PROCEDURE — 1170F FXNL STATUS ASSESSED: CPT | Performed by: INTERNAL MEDICINE

## 2024-08-04 PROBLEM — E66.01 CLASS 2 SEVERE OBESITY DUE TO EXCESS CALORIES WITH SERIOUS COMORBIDITY AND BODY MASS INDEX (BMI) OF 35.0 TO 35.9 IN ADULT (HCC): Status: RESOLVED | Noted: 2023-01-24 | Resolved: 2024-08-04

## 2024-08-04 PROBLEM — E66.812 CLASS 2 SEVERE OBESITY DUE TO EXCESS CALORIES WITH SERIOUS COMORBIDITY AND BODY MASS INDEX (BMI) OF 35.0 TO 35.9 IN ADULT (HCC): Status: RESOLVED | Noted: 2023-01-24 | Resolved: 2024-08-04

## 2024-08-04 NOTE — PROGRESS NOTES
Subjective:   Jayleen Gillette is a 69 year old female who presents for a MA AHA (Medicare Advantage Annual Health Assessment) and scheduled follow up of multiple significant but stable problems.     Continues to have pain in the left ankle has seen podiatrist wearing brace, right knee pain also bothering her which makes it difficult to walk.  She has not fallen for couple weeks.  Tremors of the hands.  And now monitoring movements of the body continues to be present, she has hard time finding neurologist at movement disorder clinic wait list is a months and some of the local doctors do not take new patients.  Patient does not drive far  She has been seeing psychiatric counselor and that is very helpful  History/Other:   Fall Risk Assessment:   She has been screened for Falls and is High Risk. Fall Prevention information provided to patient in After Visit Summary.    Do you feel unsteady when standing or walking?: Yes  Do you worry about falling?: No  Have you fallen in the past year?: Yes  How many times have you fallen?: 4  Were you injured?: No     Cognitive Assessment:   Abnormal  What day of the week is this?: Correct  What month is it?: Correct  What year is it?: Correct  Recall \"Ball\": Correct  Recall \"Flag\": Incorrect  Recall \"Tree\": Correct    Functional Ability/Status:   Jayleen Gillette has some abnormal functions as listed below:  She has Vision problems based on screening of functional status. She has Walking problems based on screening of functional status.       Depression Screening (PHQ):  PHQ-9 TOTAL SCORE: 8  , done 7/31/2024   Little interest or pleasure in doing things: 2    Feeling down, depressed, or hopeless: 3    Feeling bad about yourself - or that you are a failure or have let yourself or your family down: 3    If you checked off any problems, how difficult have these problems made it for you to do your work, take care of things at home, or get along with other people?: Somewhat difficult          Advanced Directives:   She does have a Living Will but we do NOT have it on file in Epic.    She does NOT have a Power of  for Health Care. [Do you have a healthcare power of ?: No]    Patient Active Problem List   Diagnosis    Primary osteoarthritis of right knee    Chondromalacia, right knee    Tremor, essential    Chronic tension-type headache, not intractable    Recurrent major depressive disorder, in partial remission (HCC)    Frequent episodes of bronchitis    Osteoarthritis of multiple joints    Polyarthralgia     Allergies:  She is allergic to acetaminophen, erythromycin, gentamicin, latex, neomycin, penicillins, sulfa antibiotics, adhesive tape, corn-related products, and iodine (topical).    Current Medications:  Outpatient Medications Marked as Taking for the 7/31/24 encounter (Office Visit) with Jill Coker MD   Medication Sig    omeprazole 20 MG Oral Capsule Delayed Release Take 1 capsule (20 mg total) by mouth before breakfast.    propranolol 20 MG Oral Tab Take 1 tablet (20 mg total) by mouth 2 (two) times daily.    ibuprofen 600 MG Oral Tab Take 1 tablet (600 mg total) by mouth every 8 (eight) hours as needed for Pain.    Multiple Vitamins-Minerals (MULTI-VITAMIN/MINERALS) Oral Tab Take 1 tablet by mouth daily.    EPINEPHrine 0.3 MG/0.3ML Injection Solution Auto-injector Inject 0.3 mL (1 each total) into the skin one time.    cyclobenzaprine 10 MG Oral Tab Take 1 tablet (10 mg total) by mouth 3 (three) times daily as needed for Muscle spasms.    Calcium Carbonate Antacid 500 MG Oral Chew Tab Chew 1 tablet (500 mg total) by mouth as needed.    cetirizine 10 MG Oral Tab Take 1 tablet (10 mg total) by mouth as needed.       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  Surgical History:  She  has a past surgical  history that includes wrist fracture surgery and hysterectomy (1984).   Family History:  Her family history includes Breast Cancer (age of onset: 35) in her sister; Breast Cancer (age of onset: 60) in her maternal aunt and paternal aunt.  Social History:  She  reports that she has never smoked. She has never used smokeless tobacco. She reports that she does not drink alcohol and does not use drugs.    Tobacco:  She has never smoked tobacco.    CAGE Alcohol Screen:   CAGE screening score of 0 on 7/31/2024, showing low risk of alcohol abuse.      Patient Care Team:  Jill Coker MD as PCP - General (Internal Medicine)  Yasmine Ireland MD as Consulting Physician (NEUROLOGY)    Review of Systems   Constitutional:  Positive for activity change. Negative for appetite change and fatigue.   HENT:  Positive for tinnitus. Negative for hearing loss, trouble swallowing and voice change.    Eyes:  Negative for photophobia and visual disturbance.   Respiratory:  Positive for cough. Negative for shortness of breath.    Gastrointestinal:  Negative for constipation, nausea and vomiting.   Musculoskeletal:  Positive for arthralgias, back pain and gait problem.   Neurological:  Positive for tremors, speech difficulty and headaches. Negative for numbness.   Psychiatric/Behavioral:  Positive for dysphoric mood. Negative for decreased concentration and suicidal ideas. The patient is nervous/anxious.    Physical exam.  Patient is awake alert oriented x 3  Neck is supple no JVD or bruit at  Skin without lesions  Heart S1-S2 regular no murmur gallop  Lungs are clear to auscultation no rales or wheezing  Abdomen soft nontender, no masses palpated  Extremities shows no peripheral edema.  Right knee in brace  Left ankle in brace  Cranial nerves II to XII grossly intact, speech intact.  Tremors of the both hands,.  Periodically left hand rolling tremor      /78   Pulse 67   Ht 5' 3.5\" (1.613 m)   Wt 199 lb 3.2 oz (90.4 kg)   SpO2  95%   BMI 34.73 kg/m²  Estimated body mass index is 34.73 kg/m² as calculated from the following:    Height as of this encounter: 5' 3.5\" (1.613 m).    Weight as of this encounter: 199 lb 3.2 oz (90.4 kg).    Medicare Hearing Assessment:   Hearing Screening    Time taken: 7/31/2024 10:13 AM  Screening Method: Finger Rub  Finger Rub Result: Pass         Visual Acuity:   Right Eye Visual Acuity: Corrected Right Eye Chart Acuity: 20/30   Left Eye Visual Acuity: Corrected Left Eye Chart Acuity: 20/30   Both Eyes Visual Acuity: Corrected Both Eyes Chart Acuity: 20/30            Assessment & Plan:   Jayleen Gillette is a 69 year old female who presents for a Medicare Assessment.     1. Medicare annual wellness visit, subsequent (Primary)  2. Balance problems multifactorial due to neurodegenerative disorder, referred patient to see neurologist  -     Neuro Referral - In Network  3. Breast cancer screening by mammogram  -     [facility] BRIE 2D+3D SCREENING BILAT (CPT=77067/83197); Future; Expected date: 07/31/2024  4. Colon cancer screening see gastroenterology stable no treatment needed  -     Gastro Referral - In Network  5. PFO (patent foramen ovale) (HCC)    6. Moderate episode of recurrent major depressive disorder (HCC) continue counseling, see psychiatrist  8. Tremor due to disorder of CNS see neurology, continue tremor propranolol for now  9. Polyarthralgia will check will check inflammatory markers see rheumatology  10. At risk for falls advised patient to use assistive devices cane or walker  11. Class 1 obesity due to excess calories without serious comorbidity with body mass index (BMI) of 34.0 to 34.9 in adult continue to work on low carbohydrate calorie restricted diet physical activities as tolerated    The patient indicates understanding of these issues and agrees to the plan.      Follow-up in 6 months    Jill Coker MD, 8/4/2024     Supplementary Documentation:   General Health:  In the past six months, have  you lost more than 10 pounds without trying?: 2 - No  Has your appetite been poor?: No  Type of Diet: Balanced  How does the patient maintain a good energy level?: Daily Walks  How would you describe your daily physical activity?: Light  How would you describe your current health state?: Fair  How do you maintain positive mental well-being?: Puzzles;Social Interaction  On a scale of 0 to 10, with 0 being no pain and 10 being severe pain, what is your pain level?: 6 - (Moderate)  In the past six months, have you experienced urine leakage?: 1-Yes  At any time do you feel concerned for the safety/well-being of yourself and/or your children, in your home or elsewhere?: No  Have you had any immunizations at another office such as Influenza, Hepatitis B, Tetanus, or Pneumococcal?: No    Health Maintenance   Topic Date Due    Colorectal Cancer Screening  Never done    DEXA Scan  Never done    Mammogram  05/04/2022    COVID-19 Vaccine (1 - 2023-24 season) Never done    MA Annual Health Assessment  01/01/2024    Pneumococcal Vaccine: 65+ Years (1 of 1 - PCV) 08/24/2024 (Originally 5/14/2021)    Zoster Vaccines (1 of 2) 08/24/2024 (Originally 5/14/2006)    Influenza Vaccine (1) 10/01/2024    Annual Depression Screening  Completed    Fall Risk Screening (Annual)  Completed

## 2024-08-08 ENCOUNTER — MED REC SCAN ONLY (OUTPATIENT)
Dept: INTERNAL MEDICINE CLINIC | Facility: CLINIC | Age: 69
End: 2024-08-08

## 2024-08-08 ENCOUNTER — OFFICE VISIT (OUTPATIENT)
Dept: DERMATOLOGY CLINIC | Facility: CLINIC | Age: 69
End: 2024-08-08

## 2024-08-08 DIAGNOSIS — D22.9 MULTIPLE NEVI: ICD-10-CM

## 2024-08-08 DIAGNOSIS — D23.9 BENIGN NEOPLASM OF SKIN, UNSPECIFIED LOCATION: ICD-10-CM

## 2024-08-08 DIAGNOSIS — L30.9 DERMATITIS: Primary | ICD-10-CM

## 2024-08-08 DIAGNOSIS — L82.1 SK (SEBORRHEIC KERATOSIS): ICD-10-CM

## 2024-08-08 DIAGNOSIS — L81.4 LENTIGO: ICD-10-CM

## 2024-08-08 PROCEDURE — 1160F RVW MEDS BY RX/DR IN RCRD: CPT | Performed by: DERMATOLOGY

## 2024-08-08 PROCEDURE — 99203 OFFICE O/P NEW LOW 30 MIN: CPT | Performed by: DERMATOLOGY

## 2024-08-08 PROCEDURE — 1159F MED LIST DOCD IN RCRD: CPT | Performed by: DERMATOLOGY

## 2024-08-08 RX ORDER — TRIAMCINOLONE ACETONIDE 1 MG/G
1 CREAM TOPICAL 2 TIMES DAILY
Qty: 420 UNDEFINED | Refills: 3 | Status: SHIPPED | OUTPATIENT
Start: 2024-08-08

## 2024-08-08 RX ORDER — KETOCONAZOLE 20 MG/G
1 CREAM TOPICAL 2 TIMES DAILY
Qty: 60 UNDEFINED | Refills: 3 | Status: SHIPPED | OUTPATIENT
Start: 2024-08-08

## 2024-08-18 NOTE — PROGRESS NOTES
Jayleen Gillette is a 69 year old female.  HPI:     CC:    Chief Complaint   Patient presents with    Full Skin Exam     \"New Patient\" last seen 3/01/18. Pt presents for FBSE. Pt with Hx of eczema, has c/o skin irritation along her groin area. Denies personal Hx of skin cancer. Has unknown family Hx(Father).         Allergies:  Acetaminophen, Erythromycin, Gentamicin, Latex, Neomycin, Penicillins, Sulfa antibiotics, Adhesive tape, Corn-related products, and Iodine (topical)    HISTORY:    Past Medical History:    Bronchitis    Colon abnormality    spastic    Depression    Seizure disorder (HCC)    TIA (transient ischemic attack)    Tremor    Ulcer    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HCC)      Past Surgical History:   Procedure Laterality Date    Hysterectomy  1984    Wrist fracture surgery      growth removed 1995      Family History   Problem Relation Age of Onset    Breast Cancer Sister 35    Breast Cancer Maternal Aunt 60    Breast Cancer Paternal Aunt 60      Social History     Socioeconomic History    Marital status: Single   Tobacco Use    Smoking status: Never    Smokeless tobacco: Never   Vaping Use    Vaping status: Never Used   Substance and Sexual Activity    Alcohol use: No    Drug use: Never    Sexual activity: Not Currently   Other Topics Concern    Pt has a pacemaker No    Pt has a defibrillator No    Reaction to local anesthetic No    Caffeine Concern Yes     Comment: 4 cups of coffee/day    Exercise Yes     Comment: water exercise        Current Outpatient Medications   Medication Sig Dispense Refill    triamcinolone 0.1 % External Cream Apply 1 Application topically 2 (two) times daily. 420 Undefined 3    ketoconazole 2 % External Cream Apply 1 Application topically 2 (two) times daily. Apply to affected area 2 times daily. 60 Undefined 3    omeprazole 20 MG Oral Capsule Delayed Release Take 1 capsule (20 mg total) by mouth before breakfast. 90 capsule 3    propranolol 20 MG Oral Tab Take 1 tablet  (20 mg total) by mouth 2 (two) times daily. 180 tablet 3    ibuprofen 600 MG Oral Tab Take 1 tablet (600 mg total) by mouth every 8 (eight) hours as needed for Pain. 180 tablet 3    Multiple Vitamins-Minerals (MULTI-VITAMIN/MINERALS) Oral Tab Take 1 tablet by mouth daily.      EPINEPHrine 0.3 MG/0.3ML Injection Solution Auto-injector Inject 0.3 mL (1 each total) into the skin one time.      cyclobenzaprine 10 MG Oral Tab Take 1 tablet (10 mg total) by mouth 3 (three) times daily as needed for Muscle spasms. 90 tablet 0    Calcium Carbonate Antacid 500 MG Oral Chew Tab Chew 1 tablet (500 mg total) by mouth as needed.      cetirizine 10 MG Oral Tab Take 1 tablet (10 mg total) by mouth as needed.       Allergies:   Allergies   Allergen Reactions    Acetaminophen HIVES     Other reaction(s): ACETAMINOPHEN    Erythromycin ANGIOEDEMA    Gentamicin HIVES     Other reaction(s): GENTAMICIN    Latex HIVES and RASH    Neomycin HIVES    Penicillins HIVES    Sulfa Antibiotics NAUSEA AND VOMITING    Adhesive Tape     Corn-Related Products RASH    Iodine (Topical) RASH     Other reaction(s): IODINE       Past Medical History:    Bronchitis    Colon abnormality    spastic    Depression    Seizure disorder (HCC)    TIA (transient ischemic attack)    Tremor    Ulcer    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HCC)     Past Surgical History:   Procedure Laterality Date    Hysterectomy  1984    Wrist fracture surgery      growth removed 1995     Social History     Socioeconomic History    Marital status: Single     Spouse name: Not on file    Number of children: Not on file    Years of education: Not on file    Highest education level: Not on file   Occupational History    Not on file   Tobacco Use    Smoking status: Never    Smokeless tobacco: Never   Vaping Use    Vaping status: Never Used   Substance and Sexual Activity    Alcohol use: No    Drug use: Never    Sexual activity: Not Currently   Other Topics Concern    Grew up on a farm Not  Asked    History of tanning Not Asked    Outdoor occupation Not Asked    Pt has a pacemaker No    Pt has a defibrillator No    Breast feeding Not Asked    Reaction to local anesthetic No     Service Not Asked    Blood Transfusions Not Asked    Caffeine Concern Yes     Comment: 4 cups of coffee/day    Occupational Exposure Not Asked    Hobby Hazards Not Asked    Sleep Concern Not Asked    Stress Concern Not Asked    Weight Concern Not Asked    Special Diet Not Asked    Back Care Not Asked    Exercise Yes     Comment: water exercise    Bike Helmet Not Asked    Seat Belt Not Asked    Self-Exams Not Asked   Social History Narrative    Not on file     Social Determinants of Health     Financial Resource Strain: Not on file   Food Insecurity: Not on file   Transportation Needs: Not on file   Physical Activity: Not on file   Stress: Not on file   Social Connections: Not on file   Housing Stability: Not on file     Family History   Problem Relation Age of Onset    Breast Cancer Sister 35    Breast Cancer Maternal Aunt 60    Breast Cancer Paternal Aunt 60       There were no vitals filed for this visit.    HPI:    Chief Complaint   Patient presents with    Full Skin Exam     \"New Patient\" last seen 3/01/18. Pt presents for FBSE. Pt with Hx of eczema, has c/o skin irritation along her groin area. Denies personal Hx of skin cancer. Has unknown family Hx(Father).     No personal  or family history of skin cancer  Patient with history of eczema, notes new lesions over the groin area.  History of cysts.  History of eczema arms legs.    Patient presents with concerns above.    Patient has been in their usual state of health.  History, medications, allergies reviewed as noted.      ROS:  Denies any other systemic complaints.  No new or changeing lesions other than noted above. No fevers, chills, night sweats, unusual sun sensitivity.  No other skin complaints.        History, medications, allergies reviewed as noted.        Physical Examination:     Well-developed well-nourished patient alert oriented in no acute distress.  Exam total-body performed, including scalp, head, neck, face,nails, hair, external eyes, including conjunctival mucosa, eyelids, lips external ears, back, chest,/ breasts, axillae,  abdomen, arms, legs, palms.     Multiple light to medium brown, well marginated, uniformly pigmented, macules and papules 6 mm and less scattered on exam. pigmented lesions examined with dermoscopy benign-appearing patterns.     Waxy tannish keratotic papules scattered, cherry-red vascular papules scattered.    See map today's date for lesions noted .      Otherwise remarkable for lesions as noted on map.  See details of examination  See Assessment /Plan for additional history and physical exam also:    Assessment / plan:    No orders of the defined types were placed in this encounter.      Meds & Refills for this Visit:  Requested Prescriptions     Signed Prescriptions Disp Refills    triamcinolone 0.1 % External Cream 420 Undefined 3     Sig: Apply 1 Application topically 2 (two) times daily.    ketoconazole 2 % External Cream 60 Undefined 3     Sig: Apply 1 Application topically 2 (two) times daily. Apply to affected area 2 times daily.         Encounter Diagnoses   Name Primary?    Dermatitis Yes    SK (seborrheic keratosis)     Lentigo     Benign neoplasm of skin, unspecified location     Multiple nevi        See details on map.      Remarkable for:        Dermatitis.  Meds in grid.  Skin care instructions reviewed.  [place] use of emollients.  Pathophysiology reviewed.  Consider Contac allergy in differential.  Consider patch testing.  Patient will let us know how they are doing over the next several weeks.  Await clinical response to above therapies.  Patchy areas over the arms legs triamcinolone    Ketoconazole 2 areas on toe webs,  As some fissuring and scaling at base of toes.  Cover for additional tinea  History of cystic  nodule left labia reassurance.  Multiple milia noted.  Reassurance.    Tan papule left pretibial leg observe dermatofibroma    Benign-appearing nevi, no atypical features on dermoscopy reassurance given monitor.    Waxy tan keratotic papules lesions in areas of concern as noted reassurance given.  Benign nature discussed.  Possibility of cryo, alphahydroxy acids over-the-counter retinol's discussed.    No other susupicious lesions on todays  exam.      Please refer to map for specific lesions.  See additional diagnoses.  Pros cons of various therapies, risks benefits discussed.Pathophysiology discussed with patient.  Therapeutic options reviewed.  See  Medications in grid.  Instructions reviewed at length.    Benign nevi, seborrheic  keratoses, cherry angiomas:  Reassurance regarding other benign skin lesions.Signs and symptoms of skin cancer, ABCDE's of melanoma discussed with patient. Sunscreen use, sun protection, self exams reviewed.  Followup as noted. RTC routine checkup 6 mos - one year or p.r.n.    Encounter Times   Including precharting, reviewing chart, prior notes obtaining history: 10 minutes, medical exam :10 minutes, notes on body map, plan, counseling 10minutes My total time spent caring for the patient on the day of the encounter: 30 minutes     The patient indicates understanding of these issues and agrees to the plan.  The patient is asked to return as noted in follow-up/ above.    This note was generated using Dragon voice recognition software.  Please contact me regarding any confusion resulting from errors in recognition.   Note to patient and family: The 21st Century Cures Act makes medical notes like these available to patients. However, be advised this is a medical document. It is intended as ozhr-au-fyin communication and monitoring of a patient's care needs. It is written in medical language and may contain abbreviations or verbiage that are unfamiliar. It may appear blunt or direct.  Medical documents are intended to carry relevant information, facts as evident and the clinical opinion of the practitioner.

## 2024-08-20 ENCOUNTER — TELEPHONE (OUTPATIENT)
Dept: ORTHOPEDICS CLINIC | Facility: CLINIC | Age: 69
End: 2024-08-20

## 2024-08-20 DIAGNOSIS — S93.692D TEAR OF LEFT PLANTAR CALCANEONAVICULAR LIGAMENT, SUBSEQUENT ENCOUNTER: Primary | ICD-10-CM

## 2024-08-20 RX ORDER — IBUPROFEN 600 MG/1
600 TABLET, FILM COATED ORAL EVERY 8 HOURS PRN
Qty: 180 TABLET | Refills: 3 | Status: SHIPPED | OUTPATIENT
Start: 2024-08-20

## 2024-08-20 NOTE — TELEPHONE ENCOUNTER
LOV 07/17/2024     Spoke with pt. She did six visits at [facility]- didn't see any real improvement and opted to not schedule the last two.     She states that there has really not been any improvement overall- she ahs been wearing the NB shoes, using the brace- primarily at night since it gives her a rash and she needs to wear a sock with it. She is pretty certain that the next step would be surgical.    Advised that we will check with DPM to see if she would like for her to continue the plan and follow up in office- or if she had any other recommendations at this time?    We made for her to follow up in September.

## 2024-08-20 NOTE — TELEPHONE ENCOUNTER
Dr. Beth Mccord - [phone]    Dr. Tim Lama - [phone]    Called the patient and let her know what MDs were recommended.  Patient states she wants to see Dr. Mccord and her number was given.  Referral for Wendy Bellamy DPM   to [facility]       8/20/24  1:17 PM  I do think next step is surgical  Dr Mccord and Dr Lama information please  I can always talk to her on phone or in office too, but next step is to hear about surgical options

## 2024-08-20 NOTE — TELEPHONE ENCOUNTER
Patient called and wanted to check w/ MD if she got a chance to look at the report from [facility] where she got her Pt done. Patient wants to here from Dr. Kenney on  what plan should she take. Please advise.     Patient may be reached at [phone]

## 2024-08-21 NOTE — TELEPHONE ENCOUNTER
Refill Per Protocol     Requested Prescriptions   Pending Prescriptions Disp Refills    IBUPROFEN 600 MG Oral Tab [Pharmacy Med Name: IBUPROFEN 600 MG TABLET] 180 tablet 3     Sig: TAKE 1 TABLET BY MOUTH EVERY 8 HOURS AS NEEDED FOR PAIN       Non-Narcotic Pain Medication Protocol Passed - 8/18/2024  7:25 AM        Passed - In person appointment or virtual visit in the past 6 mos or appointment in next 3 mos     Recent Outpatient Visits              1 week ago Dermatitis    [facility], [address], [place] Daphne Villarreal MD    Office Visit    2 weeks ago Medicare annual wellness visit, subsequent    [facility] [address], [place] Jill Coker MD    Office Visit    4 weeks ago Osteoarthritis of multiple joints, unspecified osteoarthritis type    [facility] [address], [place] Otto Harris DO    Office Visit    1 month ago Tendonitis    [address] Wendy Duarte DPM    Office Visit    1 month ago Sprain of left ankle, unspecified ligament, initial encounter    [facility], [address], [place] Wendy Kenney DPM    Office Visit          Future Appointments         Provider Department Appt Notes    In 2 weeks LMB DEXA RM1; LMB KACI RM1 [facility] [place] Last Mammo 5/4/21-BP    In 3 weeks Wendy Kenney DPM [facility], [address], [place]     In 1 month Scott Pollard MD [facility] BY DR COKER/ Balance problems/ [facility] MA O    In 1 month Amanda Fried MD [facility] Colon cancer screening                           Future Appointments         Provider Department Appt Notes    In 2 weeks LMB DEXA RM1; LMB KACI RM1 [facility] [place] Last Mammo 5/4/21-BP    In 3 weeks Wendy Kenney DPM [facility], [address], [place]     In 1 month Scott Pollard MD [facility] BY DR COKER/ Balance problems/ [facility]    In 1 month Amanda Fried MD [facility] Colon cancer screening          Recent Outpatient Visits              1 week ago Dermatitis    [facility], [address], [place] Daphne Villarreal MD    Office Visit    2 weeks ago Medicare annual wellness visit, subsequent    [facility] [address], [place] Jill Coker MD    Office Visit    4 weeks ago Osteoarthritis of multiple joints, unspecified osteoarthritis type    [facility], [address], [place] Otto Harris DO    Office Visit    1 month ago Tendonitis    [address] Wendy Duarte DPM    Office Visit    1 month ago Sprain of left ankle, unspecified ligament, initial encounter    [facility] [place]    Office Visit

## 2024-08-22 ENCOUNTER — TELEPHONE (OUTPATIENT)
Dept: INTERNAL MEDICINE CLINIC | Facility: CLINIC | Age: 69
End: 2024-08-22

## 2024-08-22 NOTE — TELEPHONE ENCOUNTER
Patient is requesting referral.     Name of specialist and specialty department : Dr. Beth Arrington-Orthopedic Surgery Foot & Ankle Surgery  Reason for visit with the specialist: Dr Kenney recommended patient to see a Surgeon. Dr. Kenney provided referral however insurance needs it from PCP  Address of the specialist office: [address] [place] [address] [phone]  Appointment date: No appointment yet, needs a referral.          [facility] informed patient the turnaround time for referral is 5-7 business days.  Patient was informed to check their [employer] account for referral status.

## 2024-08-27 ENCOUNTER — TELEPHONE (OUTPATIENT)
Dept: ORTHOPEDICS CLINIC | Facility: CLINIC | Age: 69
End: 2024-08-27

## 2024-08-27 NOTE — TELEPHONE ENCOUNTER
Called patient and offered her appointment with Dr Lama, but she would prefer to see Dr Mccord. She is currently scheduled for 11/13 and I offered her appointment on 10/9 at 1:15pm with Dr Mccord and she accepted. Added her to the wait list as well.

## 2024-08-27 NOTE — TELEPHONE ENCOUNTER
Patient being referred by Dr. Bishop, Podiatry for tear of left plantar. Patient had an accident that cause this to happen, and has been dealing since the first week of June. Patient informed no openings until November. Please call at [phone],thanks.

## 2024-09-05 ENCOUNTER — HOSPITAL ENCOUNTER (OUTPATIENT)
Dept: MAMMOGRAPHY | Age: 69
Discharge: HOME | End: 2024-09-05
Attending: INTERNAL MEDICINE
Payer: MEDICARE

## 2024-09-05 DIAGNOSIS — Z12.31 BREAST CANCER SCREENING BY MAMMOGRAM: ICD-10-CM

## 2024-09-05 PROCEDURE — 77067 SCR MAMMO BI INCL CAD: CPT | Performed by: INTERNAL MEDICINE

## 2024-09-05 PROCEDURE — 77063 BREAST TOMOSYNTHESIS BI: CPT | Performed by: INTERNAL MEDICINE

## 2024-09-23 ENCOUNTER — OFFICE VISIT (OUTPATIENT)
Dept: NEUROLOGY | Facility: CLINIC | Age: 69
End: 2024-09-23
Payer: MEDICARE

## 2024-09-23 VITALS — BODY MASS INDEX: 33.8 KG/M2 | WEIGHT: 198 LBS | HEIGHT: 64 IN

## 2024-09-23 DIAGNOSIS — G25.0 TREMOR, ESSENTIAL: Primary | ICD-10-CM

## 2024-09-23 DIAGNOSIS — R27.0 ATAXIA: ICD-10-CM

## 2024-09-23 DIAGNOSIS — F80.81 STUTTERING: ICD-10-CM

## 2024-09-23 DIAGNOSIS — G43.711 INTRACTABLE CHRONIC MIGRAINE WITHOUT AURA AND WITH STATUS MIGRAINOSUS: ICD-10-CM

## 2024-09-23 DIAGNOSIS — Q85.83 VHL (VON HIPPEL-LINDAU SYNDROME) (HCC): ICD-10-CM

## 2024-09-23 RX ORDER — PROPRANOLOL HYDROCHLORIDE 40 MG/1
40 TABLET ORAL 2 TIMES DAILY
Qty: 180 TABLET | Refills: 3 | Status: SHIPPED | OUTPATIENT
Start: 2024-09-23

## 2024-09-23 RX ORDER — ASPIRIN 81 MG/1
81 TABLET ORAL DAILY
Qty: 90 TABLET | Refills: 3 | Status: SHIPPED | OUTPATIENT
Start: 2024-09-23

## 2024-09-23 NOTE — PROGRESS NOTES
[facility] [address], [address]  [facility] [address]  [phone]            Neurology Initial Visit     Referred By: Dr. Coker    Chief Complaint:   Chief Complaint   Patient presents with    Neurologic Problem     New Patient presents here today to establish care, patient was referred by Jill Howard to evaluate and treat balance problems, patient c/o left side occasional tremors. Patient has previous seen a neurologist at [facility]. Current medications for tremors propranolol 20 MG       HPI:     Jayleen Gillette is a 69 year old female, who presents for von Hippel-Lindau syndrome, history of tremors, history of possible TIA.  Patient with a history of tremors since age of [age], very strong family history including her father, grandmother, and some cousins with tremors.  But also her father was developing Parkinson disease by the end of the life.  Patient was seen by movement specialist and was still diagnosed with essential tremors but they were getting progressively worse and developing some jerking component to the tremors.  Apparently there was a plan to be evaluated for possible parkinsonian syndromes but that was lost to follow-up.  There was discussion about DBS treatment by the movement specialist out of [facility] but that was lost to follow-up also.  von Hippel Lindau syndrome also was monitored but most recent MRI was in 2021.  Patient came to see me first time in September 2024, significant mount of tremors, very jerking myoclonic component to the tremors.  She has been biting her tongue or cheek sometimes.  She was very unsteady, she was falling down.    Also history of severe headaches, that were described as hot spike traveling through the back of the head into the eye itself, associated with light sensitivity, lasting for days.  She was describing a number 20 days a month, she did not want to change anything about her management of possible  migraines versus tension headaches.  .     Past Medical History:    Bronchitis    Colon abnormality    spastic    Depression    Seizure disorder (HCC)    TIA (transient ischemic attack)    Tremor    Ulcer    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HCC)       Past Surgical History:   Procedure Laterality Date    Hysterectomy  1984    Wrist fracture surgery      growth removed 1995       Social history:  History   Smoking Status    Never   Smokeless Tobacco    Never     History   Alcohol Use No     History   Drug Use Unknown       Family History   Problem Relation Age of Onset    Breast Cancer Sister 35    Breast Cancer Maternal Aunt 60    Breast Cancer Paternal Aunt 60         Current Outpatient Medications:     aspirin (ASPIRIN 81) 81 MG Oral Tab EC, Take 1 tablet (81 mg total) by mouth daily., Disp: 90 tablet, Rfl: 3    propranolol 40 MG Oral Tab, Take 1 tablet (40 mg total) by mouth 2 (two) times daily., Disp: 180 tablet, Rfl: 3    ibuprofen 600 MG Oral Tab, Take 1 tablet (600 mg total) by mouth every 8 (eight) hours as needed for Pain., Disp: 180 tablet, Rfl: 3    triamcinolone 0.1 % External Cream, Apply 1 Application topically 2 (two) times daily., Disp: 420 Undefined, Rfl: 3    ketoconazole 2 % External Cream, Apply 1 Application topically 2 (two) times daily. Apply to affected area 2 times daily., Disp: 60 Undefined, Rfl: 3    omeprazole 20 MG Oral Capsule Delayed Release, Take 1 capsule (20 mg total) by mouth before breakfast., Disp: 90 capsule, Rfl: 3    Multiple Vitamins-Minerals (MULTI-VITAMIN/MINERALS) Oral Tab, Take 1 tablet by mouth daily., Disp: , Rfl:     EPINEPHrine 0.3 MG/0.3ML Injection Solution Auto-injector, Inject 0.3 mL (1 each total) into the skin one time., Disp: , Rfl:     cyclobenzaprine 10 MG Oral Tab, Take 1 tablet (10 mg total) by mouth 3 (three) times daily as needed for Muscle spasms., Disp: 90 tablet, Rfl: 0    cetirizine 10 MG Oral Tab, Take 1 tablet (10 mg total) by mouth as needed.,  Disp: , Rfl:     Calcium Carbonate Antacid 500 MG Oral Chew Tab, Chew 1 tablet (500 mg total) by mouth as needed. (Patient not taking: Reported on 9/23/2024), Disp: , Rfl:     Allergies   Allergen Reactions    Acetaminophen HIVES     Other reaction(s): ACETAMINOPHEN    Erythromycin ANGIOEDEMA    Gentamicin HIVES     Other reaction(s): GENTAMICIN    Latex HIVES and RASH    Neomycin HIVES    Penicillins HIVES    Sulfa Antibiotics NAUSEA AND VOMITING    Adhesive Tape     Corn-Related Products RASH    Iodine (Topical) RASH     Other reaction(s): IODINE       ROS:   As in HPI, the rest of the 14 system review was done and was negative      Physical Exam:  Vitals:    09/23/24 1342   Weight: 198 lb (89.8 kg)   Height: 64\"       General: No apparent distress, well nourished, well groomed.  Head- Normocephalic, atraumatic  Eyes- No redness or swelling  ENT- Hearing intake, normal glutition  Neck- No masses or adenopathy  Cv: pulses were palpable and normal, no cyanosis or edema     Neurological:     Mental Status- Alert and oriented x3.  Normal attention span and concentration  Thought process intact  Memory intact- recent and remote  Mood intact  Fund of knowledge appropriate for education and age    Language intact including: comprehension, naming, repetition, vocabulary    Cranial Nerves:    III, IV, VI- EOM intact, EVE  V. Facial sensation intact  VII. Face symmetric, no facial weakness  VIII. Hearing intact to whisper.  IX. Pallet elevates symmetrically.  XI. Shoulder shrug is intact  XII. Tongue is midline    Motor Exam:  Significant ongoing jerking tremors noted, bilaterally, head tremors, or trunk tremors.  No atrophy or fasciculations  Strength- upper extremities 5/5 proximally and distally                  - lower  extremities 5/5 proximally and distally    Sensory Exam:  Light touch sensation- intact in all 4 extremities    Balance was very poor, jerking, unsteady.    Labs:    No results found for: \"TSH\"  No  results found for: \"CHOL\", \"HDL\", \"LDL\", \"TRIG\"  Lab Results   Component Value Date    HGB 12.6 07/23/2024    HCT 38.2 07/23/2024    MCV 89.0 07/23/2024    WBC 10.6 07/23/2024    .0 07/23/2024      Lab Results   Component Value Date    BUN 22 (H) 10/13/2022    CA 9.6 10/13/2022    ALT 20 07/23/2024    AST 22 07/23/2024    ALB 3.7 10/13/2022     10/13/2022    K 4.5 10/13/2022     10/13/2022    CO2 27.0 10/13/2022      I have reviewed labs.      Assessment   1. Tremor, essential  While patient was diagnosed with essential tremors, at this point her character of tremors is changing and becoming very jerky in nature, possibility of some parkinsonian syndrome could be entertained especially with such poor balance.  DaTscan will be ordered and patient will be referred to movement specialist at tertiary center for possibility of discussion for DBS.  She will think about it and she will let me know.  For now she will increase the dose of propranolol to 40 mg twice a day.  Does worry about her orthostatic hypotension, we discussed possibility of primidone, but patient was reluctant to consider that either.  - NM BRAIN DORIS IOFLUPANE (SINGLE) 1 DAY (CPT=78803); Future    2. Ataxia  For her poor balance we will also start with physical therapy  - NM BRAIN DORIS IOFLUPANE (SINGLE) 1 DAY (CPT=78803); Future  - Physical Therapy Referral - [facility]    3. 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[facility])  Continue to monitor for VHL syndrome ophthalmology referral, audiology referral, MRI of the brain and cervical spine also will be done.  - Ophthalmology Referral - In Network  - Audiology Referral - In Network  - MRI BRAIN (W+WO) (CPT=70553); Future  - MRI SPINE CERVICAL (W+WO) (CPT=72156); Future    4. Intractable chronic migraine without aura and with status migrainosus  Possibility of chronic migraines, patient did not want to do any management changes.  Continue with hydration    5. Stuttering            Education and counseling provided to patient. Instructed patient to call my office or seek medical attention immediately if symptoms worsen.  Patient verbalized understanding of information given. All questions were answered. All side effects of drugs were discussed.       Return to clinic in: Return in about 2 months (around 11/23/2024).    Scott Pollard MD

## 2024-09-24 ENCOUNTER — TELEPHONE (OUTPATIENT)
Dept: CASE MANAGEMENT | Age: 69
End: 2024-09-24

## 2024-09-24 DIAGNOSIS — Q85.83 VHL (VON HIPPEL-LINDAU SYNDROME) (HCC): Primary | ICD-10-CM

## 2024-09-24 DIAGNOSIS — Z01.00 ENCOUNTER FOR COMPLETE EYE EXAM: ICD-10-CM

## 2024-09-24 NOTE — TELEPHONE ENCOUNTER
Dr. Coker,     Patient called requesting referral to a retinal specialist for VHL.      Please provide the name of the specialist you recommend on the referral.     Patient is also requesting a referral for an audiologist due to VHL.     Please provide the name of the specialist you recommend on the referral.     Pended referral please review diagnosis and sign off if you agree.    Thank you.  Karma Levin  [facility]

## 2024-10-09 ENCOUNTER — OFFICE VISIT (OUTPATIENT)
Dept: ORTHOPEDICS CLINIC | Facility: CLINIC | Age: 69
End: 2024-10-09

## 2024-10-09 ENCOUNTER — TELEPHONE (OUTPATIENT)
Dept: NEUROLOGY | Facility: CLINIC | Age: 69
End: 2024-10-09

## 2024-10-09 ENCOUNTER — HOSPITAL ENCOUNTER (OUTPATIENT)
Dept: GENERAL RADIOLOGY | Age: 69
Discharge: HOME | End: 2024-10-09
Attending: ORTHOPAEDIC SURGERY
Payer: MEDICARE

## 2024-10-09 DIAGNOSIS — S93.602A FOOT SPRAIN, LEFT, INITIAL ENCOUNTER: ICD-10-CM

## 2024-10-09 DIAGNOSIS — M76.822 TIBIALIS POSTERIOR TENDINITIS, LEFT: ICD-10-CM

## 2024-10-09 DIAGNOSIS — M76.62 ACHILLES TENDINITIS OF LEFT LOWER EXTREMITY: ICD-10-CM

## 2024-10-09 DIAGNOSIS — M95.8 OSTEOCHONDRAL DEFECT OF TALUS: ICD-10-CM

## 2024-10-09 DIAGNOSIS — S93.492A SPRAIN OF ANTERIOR TALOFIBULAR LIGAMENT OF LEFT ANKLE, INITIAL ENCOUNTER: ICD-10-CM

## 2024-10-09 DIAGNOSIS — M76.822 TIBIALIS POSTERIOR TENDINITIS, LEFT: Primary | ICD-10-CM

## 2024-10-09 PROCEDURE — 1160F RVW MEDS BY RX/DR IN RCRD: CPT | Performed by: ORTHOPAEDIC SURGERY

## 2024-10-09 PROCEDURE — 99214 OFFICE O/P EST MOD 30 MIN: CPT | Performed by: ORTHOPAEDIC SURGERY

## 2024-10-09 PROCEDURE — 73630 X-RAY EXAM OF FOOT: CPT | Performed by: ORTHOPAEDIC SURGERY

## 2024-10-09 PROCEDURE — 1125F AMNT PAIN NOTED PAIN PRSNT: CPT | Performed by: ORTHOPAEDIC SURGERY

## 2024-10-09 PROCEDURE — 1159F MED LIST DOCD IN RCRD: CPT | Performed by: ORTHOPAEDIC SURGERY

## 2024-10-09 NOTE — H&P
[facility] New Patient Note    CC:   Chief Complaint   Patient presents with    Ankle Pain     Consult - Referred by Dr. Kenney. Left ankle pain. Patient rates her pain 8/10 at this time.        HPI: This 69 year old female presents today with complaints of Left ankle pain that has been present for several months. She hit a curb and injured her ankle and noticed her foot really flattened out. She had an old softball injury as a kid with a partial tear of her ankle. She is seeing a neurologist for her balance issues and a work-up for parkinson's.     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 90 tablet 3    propranolol 40 MG Oral Tab Take 1 tablet (40 mg total) by mouth 2 (two) times daily. 180 tablet 3    ibuprofen 600 MG Oral Tab Take 1 tablet (600 mg total) by mouth every 8 (eight) hours as needed for Pain. 180 tablet 3    triamcinolone 0.1 % External Cream Apply 1 Application topically 2 (two) times daily. 420 Undefined 3    ketoconazole 2 % External Cream Apply 1 Application topically 2 (two) times daily. Apply to affected area 2 times daily. 60 Undefined 3    omeprazole 20 MG Oral Capsule Delayed Release Take 1 capsule (20 mg total) by mouth before breakfast. 90 capsule 3    Multiple Vitamins-Minerals (MULTI-VITAMIN/MINERALS) Oral Tab Take 1 tablet by mouth daily.      EPINEPHrine 0.3 MG/0.3ML Injection Solution Auto-injector Inject 0.3 mL (1 each total) into the skin one time.      cyclobenzaprine 10 MG Oral Tab Take 1 tablet (10 mg total) by mouth 3 (three)  times daily as needed for Muscle spasms. 90 tablet 0    Calcium Carbonate Antacid 500 MG Oral Chew Tab Chew 1 tablet (500 mg total) by mouth as needed.      cetirizine 10 MG Oral Tab Take 1 tablet (10 mg total) by mouth as needed.       Allergies[1]  Family History   Problem Relation Age of Onset    Breast Cancer Sister 35    Breast Cancer Maternal Aunt 60    Breast Cancer Paternal Aunt 60     Social History     Occupational History    Not on file   Tobacco Use    Smoking status: Never    Smokeless tobacco: Never   Vaping Use    Vaping status: Never Used   Substance and Sexual Activity    Alcohol use: No    Drug use: Never    Sexual activity: Not Currently          Physical Exam:    There were no vitals taken for this visit.  General: Awake, alert, no distress.   Psychological: Appropriate affect.  Respiratory: Unlabored breathing.  Gait: Nonantalgic  Left ankle neutral alignment, hindfoot valgus  DF/PF/EHL intact, SILT, cap refill brisk    Left foot flexible flatfoot alignment that is correctable to neutral, Achilles TTP with swelling to posterior achilles. TTP spring ligament, TTP posterior tibial tendon, unable to perform single limb heel rise. Medial varicose veins    Imaging: 3 weightbearing views of the left foot personally viewed, independently interpreted and radiology report read.  Left foot with 30% talar head uncoverage, mild talonavicular joint narrowing, Arlyn deformity present, small plantar calcaneal exostosis    MRI of the left ankle fusiform swelling to the mid substance Achilles with approximately 50% partial tear of the Achilles present and significant edema present in the mid substance region.  Mild edema in sinus Tarsi region along calcaneus, chronic ATFL tear present attenuation of the spring ligament, mild posterior tibial tendinosis and tenosynovitis, peroneus brevis tendon tendinosis, extensor digitorum tenosynovitis at the level of the anterior ankle joint, ganglion cyst along the dorsal  neck of the talus.  2 to 3 mm high grade chondral defect on the medial talar dome    Labs: Creatinine normal, AST ALT normal, CBC normal, rheumatoid factor normal      Assessment/Plan:  1. Achilles tendinitis of left lower extremity  Patient has severe acute on chronic Achilles tendinitis with tendinosis present.  She is a partial tear of the tendon.  She has failed over 3 months conservative management including wearing a boot, anti-inflammatories, and activity modifications.    2. Tibialis posterior tendinitis, left  Patient has tried shoewear modifications and activity modifications without pain relief as well as physical therapy    3. Osteochondral defect of talus  Patient has some partial thickness loss of cartilage on the medial talar dome    4. Foot sprain, left, initial encounter  There is a chronic sprain of her spring ligament as well as a chronic tear of her ATFL      Assessment: Patient is a 69-year-old female with severe pes planovalgus alignment and significant pain with impingement of her foot due to her flatfoot deformity.  She has tendinosis that is causing issues for her ankle and foot.  We discussed conservative and surgical treatment options.  She has failed conservative management including anti-inflammatories, physical therapy, bracing, and activity modifications.  She would like to proceed with surgery.  The surgery we discussed was a left foot posterior tibial tendon repair, medial displacement calcaneal osteotomy, peroneal brevis to peroneal peroneal longus transfer, Achilles tendon repair, spring ligament secondary reconstruction.  Risks and benefits of surgery were discussed including but not limited to DVT, infection, wound healing problems, bone healing problems, neurovascular damage, risk of recurrence, risk of continued pain, risk of weakness, risk for need for further surgery.  Patient verbalized understanding and wishes to proceed with surgery before the end of the year.      Insert  consent Beth Arrington DO  10/9/2024         [1]   Allergies  Allergen Reactions    Acetaminophen HIVES     Other reaction(s): ACETAMINOPHEN    Erythromycin ANGIOEDEMA    Gentamicin HIVES     Other reaction(s): GENTAMICIN    Latex HIVES and RASH    Neomycin HIVES    Penicillins HIVES    Sulfa Antibiotics NAUSEA AND VOMITING    Adhesive Tape     Corn-Related Products RASH    Iodine (Topical) RASH     Other reaction(s): IODINE

## 2024-10-09 NOTE — TELEPHONE ENCOUNTER
Could you call nuclear department to find if there is any iodine in contrast.  I am pretty sure there is none

## 2024-10-09 NOTE — TELEPHONE ENCOUNTER
Pt called asking to speak with clinical team about an upcoming DORIS test ordered by provider. Per pt she is allergic to iodine and when looking into the test she saw an iodine flush is to be used. She is wondering what should be done in order for her to not have an allergic reaction.     Pt stated if when calling back to leave a detailed voicemail if she does not answer

## 2024-10-10 ENCOUNTER — TELEPHONE (OUTPATIENT)
Dept: ORTHOPEDICS CLINIC | Facility: CLINIC | Age: 69
End: 2024-10-10

## 2024-10-10 DIAGNOSIS — M76.62 ACHILLES TENDINITIS OF LEFT LOWER EXTREMITY: Primary | ICD-10-CM

## 2024-10-10 DIAGNOSIS — S93.492A SPRAIN OF ANTERIOR TALOFIBULAR LIGAMENT OF LEFT ANKLE, INITIAL ENCOUNTER: ICD-10-CM

## 2024-10-10 DIAGNOSIS — M95.8 OSTEOCHONDRAL DEFECT OF TALUS: ICD-10-CM

## 2024-10-10 DIAGNOSIS — S93.602A FOOT SPRAIN, LEFT, INITIAL ENCOUNTER: ICD-10-CM

## 2024-10-10 NOTE — TELEPHONE ENCOUNTER
Returned PC to the patient. Per Dr. Pollard, instructed the patient to cancel her DORIS scan appointment and follow-up in clinic as instructed on 11/25/24. She verbalized understanding.     She also asked for a letter of clearance for left ankle surgery on 11/14/24 under general anesthesia. Message routed to Dr. Pollard to advise.

## 2024-10-10 NOTE — TELEPHONE ENCOUNTER
Spoke with patient to inform her that her surgery with Dr. Arrington will be scheduled for 11/14. She was informed that she needs to have medical clearance for this surgery. Patient verbalized understanding.

## 2024-10-10 NOTE — TELEPHONE ENCOUNTER
Type of surgery:  Left foot posterior tibial tendon tenolysis, medial displacement calcaneal osteotomy, peroneal brevis to longus tranfer, secondary achilles tendon repair, spring ligament secondary reconstruction    Date: 11/14/24  Location: [facility]  Medical Clearance:      *Medical: Yes      *Dental:      *Other:  Prior Authorization Status: Pending   Workers Comp:  [place]:  [place]: Yes  POV: 11/27/24

## 2024-10-11 NOTE — TELEPHONE ENCOUNTER
Letter of clearance written per Dr. Pollard and placed in patient's chart. Left VM informing the patient. Encouraged her to call back with any other questions.

## 2024-10-15 ENCOUNTER — OFFICE VISIT (OUTPATIENT)
Dept: GASTROENTEROLOGY | Facility: CLINIC | Age: 69
End: 2024-10-15

## 2024-10-15 ENCOUNTER — TELEPHONE (OUTPATIENT)
Dept: GASTROENTEROLOGY | Facility: CLINIC | Age: 69
End: 2024-10-15

## 2024-10-15 VITALS
BODY MASS INDEX: 35 KG/M2 | DIASTOLIC BLOOD PRESSURE: 80 MMHG | HEIGHT: 64 IN | WEIGHT: 205 LBS | SYSTOLIC BLOOD PRESSURE: 128 MMHG

## 2024-10-15 DIAGNOSIS — Z12.11 SPECIAL SCREENING FOR MALIGNANT NEOPLASMS, COLON: Primary | ICD-10-CM

## 2024-10-15 DIAGNOSIS — K59.00 CONSTIPATION, UNSPECIFIED CONSTIPATION TYPE: Primary | ICD-10-CM

## 2024-10-15 DIAGNOSIS — K62.5 RECTAL BLEEDING: ICD-10-CM

## 2024-10-15 DIAGNOSIS — Z87.11 HISTORY OF GASTRIC ULCER: ICD-10-CM

## 2024-10-15 DIAGNOSIS — Z12.11 COLON CANCER SCREENING: ICD-10-CM

## 2024-10-15 PROCEDURE — 99205 OFFICE O/P NEW HI 60 MIN: CPT | Performed by: INTERNAL MEDICINE

## 2024-10-15 PROCEDURE — 3079F DIAST BP 80-89 MM HG: CPT | Performed by: INTERNAL MEDICINE

## 2024-10-15 PROCEDURE — 1159F MED LIST DOCD IN RCRD: CPT | Performed by: INTERNAL MEDICINE

## 2024-10-15 PROCEDURE — 3008F BODY MASS INDEX DOCD: CPT | Performed by: INTERNAL MEDICINE

## 2024-10-15 PROCEDURE — 3074F SYST BP LT 130 MM HG: CPT | Performed by: INTERNAL MEDICINE

## 2024-10-15 PROCEDURE — 1160F RVW MEDS BY RX/DR IN RCRD: CPT | Performed by: INTERNAL MEDICINE

## 2024-10-15 RX ORDER — SODIUM, POTASSIUM,MAG SULFATES 17.5-3.13G
SOLUTION, RECONSTITUTED, ORAL ORAL
Qty: 1 EACH | Refills: 0 | Status: SHIPPED | OUTPATIENT
Start: 2024-10-15

## 2024-10-15 NOTE — TELEPHONE ENCOUNTER
Patient was seen in office today. Patient was provided prep instruction sheet. Informed patient he/she will receive a phone call to schedule procedure(s) and he/she verbalized understanding. Please schedule patient per below orders. Thank you!       ORDERS:  1. Schedule colonoscopy with [facility] [Diagnosis: CRC screening].     2.  bowel prep from pharmacy (split dose suprep). If your prescription is not available and/or is cost-prohibitive, please contact the office as soon as possible to ensure you receive a bowel prep before your procedure. Look on [employer] or the [facility] website for coupons.      3. Continue all medications for procedure.     4. Read all bowel prep instructions carefully.     5. AVOID seeds, nuts, popcorn, and raw fruits and vegetables (cooked is okay) for 2-3 days before procedure.     6. If you start any NEW medication after your visit today, please notify us. Certain medications will need to be held before the procedure or the procedure cannot be performed.      7. You will need a ride home from your procedure since you are receiving sedation. Please ensure you will have an available ride home or the procedure cannot be performed.

## 2024-10-15 NOTE — H&P
[facility]                                                                                                  Clinic History and Physical     Chief Complaint   Patient presents with    Colonoscopy Screening     Last colon/egd around 10 years ago; hx of ulcers & spastic colon & constipation       Requesting physician or provider: Jill Coker MD    HPI:   Jayleen Gillette is a 69 year old female with history of gastric ulcers, von Hippel-Lindau syndrome, TIA, essential tremor, seizure disorder, depression, who presents for colon cancer screening evaluation.    Pt here for CRC screening. Previous colonoscopy more than 10 years ago, reportedly normal.  She notes that she has a history of a \"spastic colon\".  She thinks this means she has IBS.  She denies a history of polyps.  No family history of colon cancer.  She notes that she has \"horrendous\" constipation.  She feels her stool is like cement.  She will have a large amount of stool that gets stuck in her rectum and requires digital disimpaction to remove.  Then she will pass pebble-like stool the rest of the day.  She normally moves her bowels every 2.5 days.  She has noted some blood in the stool when she has been straining or passes hard stool.  She stays well-hydrated.  She has tried Dulcolax, fiber supplements, and suppositories.  She notes that she does have a history of essential tremors and she has been worked up for possible Parkinson's.    She also notes history of gastric ulcers.  She uses NSAIDs.  She is on a PPI.    Family history colon cancer: None  Significant constipation issues: As above     Prior Endoscopies  Last Colonoscopy: Greater than 10 years ago, reportedly normal  Last EGD: Greater than 10 years ago, history of gastric ulcers     Denies adverse reaction to sedation.   Denies history of PAOLA.   Denies pacemaker/defibrillator.   Denies anticoagulation use.   Denies chronic pain medication use and/or other sedating  medications.   Denies tobacco use.   Denies significant EtOH use.   Denies recreational drug use.     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[facility])      Past Surgical History:   Procedure Laterality Date    Hysterectomy  1984    Wrist fracture surgery      growth removed 1995      Family Hx:   Family History   Problem Relation Age of Onset    Breast Cancer Sister 35    Breast Cancer Maternal Aunt 60    Breast Cancer Paternal Aunt 60      Social History:   Social History     Socioeconomic History    Marital status: Single   Tobacco Use    Smoking status: Never    Smokeless tobacco: Never   Vaping Use    Vaping status: Never Used   Substance and Sexual Activity    Alcohol use: No    Drug use: Never    Sexual activity: Not Currently   Other Topics Concern    Pt has a pacemaker No    Pt has a defibrillator No    Reaction to local anesthetic No    Caffeine Concern Yes     Comment: 4 cups of coffee/day    Exercise Yes     Comment: water exercise        Medications (Active prior to today's visit):  Current Outpatient Medications   Medication Sig Dispense Refill    Na Sulfate-K Sulfate-Mg Sulf (SUPREP BOWEL PREP KIT) 17.5-3.13-1.6 GM/177ML Oral Solution Take as discussed in clinic 1 each 0    aspirin (ASPIRIN 81) 81 MG Oral Tab EC Take 1 tablet (81 mg total) by mouth daily. 90 tablet 3    propranolol 40 MG Oral Tab Take 1 tablet (40 mg total) by mouth 2 (two) times daily. 180 tablet 3    ibuprofen 600 MG Oral Tab Take 1 tablet (600 mg total) by mouth every 8 (eight) hours as needed for Pain. 180 tablet 3    triamcinolone 0.1 % External Cream Apply 1 Application topically 2 (two) times daily. 420 Undefined 3    ketoconazole 2 % External Cream Apply 1 Application topically 2 (two) times daily. Apply to affected area 2 times  daily. 60 Undefined 3    omeprazole 20 MG Oral Capsule Delayed Release Take 1 capsule (20 mg total) by mouth before breakfast. 90 capsule 3    Multiple Vitamins-Minerals (MULTI-VITAMIN/MINERALS) Oral Tab Take 1 tablet by mouth daily.      EPINEPHrine 0.3 MG/0.3ML Injection Solution Auto-injector Inject 0.3 mL (1 each total) into the skin one time.      cyclobenzaprine 10 MG Oral Tab Take 1 tablet (10 mg total) by mouth 3 (three) times daily as needed for Muscle spasms. 90 tablet 0    Calcium Carbonate Antacid 500 MG Oral Chew Tab Chew 1 tablet (500 mg total) by mouth as needed.      cetirizine 10 MG Oral Tab Take 1 tablet (10 mg total) by mouth as needed.         Allergies:  Allergies[1]    ROS:   CONSTITUTIONAL:  negative for fevers, rigors  EYES:  negative for diplopia   RESPIRATORY:  negative for severe shortness of breath  CARDIOVASCULAR:  negative for crushing sub-sternal chest pain  GASTROINTESTINAL:  see HPI  GENITOURINARY:  negative for dysuria or gross hematuria  SKIN:  negative for jaundice   ALLERGIC/IMMUNOLOGIC:  negative for hay fever  ENDOCRINE:  negative for cold intolerance and heat intolerance  MUSCULOSKELETAL:  negative for joint effusion/severe erythema  BEHAVIOR/PSYCH:  negative for psychotic behavior    PHYSICAL EXAM:   Blood pressure 128/80, height 5' 4\" (1.626 m), weight 205 lb (93 kg).    Gen: appears comfortable and in no acute distress  HEENT: sclera appear anicteric, mucus membranes appear moist  CV: the extremities are warm and well-perfused   Lung: no increased work of breathing, no conversational dyspnea   Abd: soft, mild tenderness to palpation in the left upper quadrant without rigidity or guarding, non-distended, no masses palpated  Skin: no jaundice, no apparent rashes   Neuro: alert and interactive, no focal neuro deficits  Psych: cooperative, normal affect     Labs/Imaging:     Patient's labs and imaging were reviewed and discussed with patient today.    .  ASSESSMENT/PLAN:   Jayleen  RODOLFO Gillette is a 69 year old female with history of gastric ulcers, von Hippel-Lindau syndrome, TIA, essential tremor, seizure disorder, depression, who presents for colon cancer screening evaluation.    Patient is considered average risk for colon cancer and it is appropriate to proceed with screening colonoscopy. Last colonoscopy over 10 years ago, reportedly normal. No family history of colon cancer.  She notes severe constipation.  She requires digital disimpaction to have a bowel movement.  She has noted some rectal bleeding related to passing hard stool.  She has tried fiber supplementation, Dulcolax, and suppositories.  Recent labs without anemia.  She previously had a normal TSH.  She is being worked up for possible Parkinson's and there is some relation of onset of constipation prior to other parkinsonian symptoms.  We discussed obtaining a KUB to assess stool burden.  If she has a significant amount of stool burden, we will plan for a bowel purge and then starting daily MiraLAX.  She may need a stronger laxative, such as Linzess, but she is hesitant to start a medication like this just yet.  We also discussed using squatty potty or equivalent for correct toilet posturing.  We discussed risks/benefits/alternatives to procedure; patient would like to proceed with colonoscopy.    Recommendations:  Get an abdominal x-ray. Depending on results, we will discuss when or how to start miralax.     1. Schedule colonoscopy with [facility] [Diagnosis: CRC screening].    2.  bowel prep from pharmacy (split dose suprep). If your prescription is not available and/or is cost-prohibitive, please contact the office as soon as possible to ensure you receive a bowel prep before your procedure. Look on [employer] or the suprep website for coupons.     3. Continue all medications for procedure.    4. Read all bowel prep instructions carefully.    5. AVOID seeds, nuts, popcorn, and raw fruits and vegetables (cooked is okay) for 2-3  days before procedure.    6. If you start any NEW medication after your visit today, please notify us. Certain medications will need to be held before the procedure or the procedure cannot be performed.     7. You will need a ride home from your procedure since you are receiving sedation. Please ensure you will have an available ride home or the procedure cannot be performed.       Colonoscopy consent: I have discussed the risks, benefits, and alternatives to colonoscopy with the patient [who demonstrated understanding], including but not limited to the risks of bleeding, infection, pain, as well as the risks of anesthesia and perforation all leading to prolonged hospitalization, surgical intervention. I also specifically mentioned the miss rate of colonoscopy of 5-10% in the best of all circumstances. All questions were answered to the patient’s satisfaction. The patient elected to proceed with colonoscopy with intervention [i.e. polypectomy, etc.] as indicated.    Orders This Visit:  No orders of the defined types were placed in this encounter.      Meds This Visit:  Requested Prescriptions     Signed Prescriptions Disp Refills    Na Sulfate-K Sulfate-Mg Sulf (SUPREP BOWEL PREP KIT) 17.5-3.13-1.6 GM/177ML Oral Solution 1 each 0     Sig: Take as discussed in clinic       Imaging & Referrals:  XR ABDOMEN (1 VIEW) (SLJ=[id number])       Amanda Fried MD  [facility]  10/15/2024    Prior to this encounter, I spent 5 minutes preparing for the visit, reviewing documents from other specialties as well as from the primary care physician, reviewing prior procedure notes and pathology reports (if available) and going over test results.    I spent an additional 60 minutes obtaining history, evaluating the patient including a medically appropriate exam, discussing treatment options and counseling/educating the patient, reviewing the ordering tests/medications/procedures, and completing  documentation.    In total; 65 minutes was spent during this encounter.    New patients ([id number])  Straightforward - minimum is 15 minutes  Low - minimum is 30 minutes to 44 minutes  Moderate - minimum is 45 minutes to 59 minutes  High - minimum is 60 minutes - 74 minutes    Established patients ([id number] - [id number])  Straightforward - minimum is 10 minutes  Low - minimum is 20 minutes to 29 minutes  Moderate - minimum is 30 minutes to 39 minutes  High - minimum is 40 minutes to 54 minutes           [1]   Allergies  Allergen Reactions    Acetaminophen HIVES     Other reaction(s): ACETAMINOPHEN    Erythromycin ANGIOEDEMA    Gentamicin HIVES     Other reaction(s): GENTAMICIN    Latex HIVES and RASH    Neomycin HIVES    Penicillins HIVES    Sulfa Antibiotics NAUSEA AND VOMITING    Adhesive Tape     Corn-Related Products RASH    Iodine (Topical) RASH     Other reaction(s): IODINE

## 2024-10-16 ENCOUNTER — TELEPHONE (OUTPATIENT)
Facility: CLINIC | Age: 69
End: 2024-10-16

## 2024-10-16 ENCOUNTER — HOSPITAL ENCOUNTER (OUTPATIENT)
Dept: GENERAL RADIOLOGY | Age: 69
Discharge: HOME | End: 2024-10-16
Attending: INTERNAL MEDICINE
Payer: MEDICARE

## 2024-10-16 DIAGNOSIS — K59.00 CONSTIPATION, UNSPECIFIED CONSTIPATION TYPE: ICD-10-CM

## 2024-10-16 PROCEDURE — 74018 RADEX ABDOMEN 1 VIEW: CPT | Performed by: INTERNAL MEDICINE

## 2024-10-16 NOTE — TELEPHONE ENCOUNTER
Spoke to patient    I told her we will hold off on sending in the bowel prep.    Current plan is XR AB and possible bowel wash out    Patient agreeable to plan    Dr Amanda CASTRO to hold off on sending in prep until closer to procedure?

## 2024-10-16 NOTE — TELEPHONE ENCOUNTER
Patient states that the Bowel preparation that was sent to the pharmacy will not be covered by insurance.   She states that she was informed that preferred prescription:  Gavilyte, PEG or to use Miralax.  Patient is requesting a new prescription to be sent to [facility] and to be contacted after it is changed.      She also states Abdominal Xray is scheduled for today at 3:45 pm  Please call

## 2024-10-16 NOTE — TELEPHONE ENCOUNTER
Scheduled for:  Colonoscopy [id number]    Provider Name:  Dr Kohlerksabhijeet    Date:  2/4/2025    Location:   [facility]    Sedation:  MAC    Time:  12:30 pm (Patient made aware [facility] will call the day before with an arrival time     Prep:  Suprep    Meds/Allergies Reconciled?:  Physician reviewed     Diagnosis with codes:      Was patient informed to call insurance with codes (Y/N):  Yes, I confirmed [employer] with the patient.     Referral sent?:  N/A    [facility] or [facility] notified?:  I sent an electronic request to Endo Scheduling and received a confirmation today.      Medication Orders:  This patient verbally confirmed that she is not taking:    Iron, blood thinners, BP meds, and is not diabetic    Not latex allergy, PCN allergy and does not have a pacemaker     Misc Orders:       Further instructions given by staff:   I discussed the prep instructions with the patient which she verbally understood and is aware that I will send the instructions today via [employer].    Advised patient:    You will not be able to drive, operate machinery or make critical decisions the day of your procedure. Please make arrangements for transportation. You must have a  (age 18 or older) to accompany you, stay in the facility for the duration of your procedure and drive you home after the procedure.  You cannot use public transportation ([employer], Lyft, Taxi). The procedure involves sedation, and you will not be allowed to leave unaccompanied. Your procedure will not proceed forward if you're unable to confirm your  planned to escort you home.

## 2024-10-18 NOTE — PROGRESS NOTES
Please let the pt know that her abdominal x-ray showed stool in the colon. There wasn't a huge amount that I feel she needs a bowel purge. She should start taking miralax 1 capful a day and take this every day. She should update me in about 2 weeks on if this is helping her or not. Encourage her to sign up for mychart!

## 2024-10-22 ENCOUNTER — TELEPHONE (OUTPATIENT)
Dept: ORTHOPEDICS CLINIC | Facility: CLINIC | Age: 69
End: 2024-10-22

## 2024-10-22 ENCOUNTER — TELEPHONE (OUTPATIENT)
Facility: CLINIC | Age: 69
End: 2024-10-22

## 2024-10-22 NOTE — TELEPHONE ENCOUNTER
Spoke to patient    Relayed message from Dr. Fried below including the plan  Patient verbalized understanding and had no further questions    She will update us in 1-2 weeks

## 2024-10-22 NOTE — TELEPHONE ENCOUNTER
----- Message from Amanda Fried sent at 10/18/2024  5:12 PM CDT -----  Please let the pt know that her abdominal x-ray showed stool in the colon. There wasn't a huge amount that I feel she needs a bowel purge. She should start taking miralax 1 capful a day and take this every day. She should update me in about 2 weeks on if this is helping her or not. Encourage her to sign up for mychart!    aerobic capacity/endurance/gait, locomotion, and balance

## 2024-10-22 NOTE — TELEPHONE ENCOUNTER
Patient wants a right knee steroid injection, is this acceptable with the timing of the ankle surgery and that it is a different side of the body?  Please advise.  Thank you!    DOS 11/14/24  Left achilles tendon repair-  Dr Arrington    LOV  6/17/24- Dr Childs     Right knee steroid injection

## 2024-10-22 NOTE — TELEPHONE ENCOUNTER
Patient wants to come in and get a shot for her right knee but she is having ankle surgery on 11/14/24 and wasn't sure if she can get it done before her surgery.   Please advise.

## 2024-10-24 NOTE — TELEPHONE ENCOUNTER
Beth Arrington, DO to [facility]        10/23/24  2:31 PM  That injection is fine with me

## 2024-10-24 NOTE — TELEPHONE ENCOUNTER
Dr Arrington responded that the right knee steroid is fine with her.  Patient lives in [place] and will be seeing Dr Coker 10/28/24 (Monday) at 1:00 for surgical clearance who is literally right across the [place] from our [place] office.  Is there anyway you could squeeze her in for a right knee steroid injection?  Her knee is hurting and she is anticipating more stress on it as she is healing from her left heel surgery.      Last right knee injection 6/17/24 Dr Childs  Left achilles surgery 11/14/24 Dr Arrington    Future Appointments   Date Time Provider Department Center   10/28/2024  1:00 PM Jill Coker MD [id number] [place]

## 2024-10-24 NOTE — TELEPHONE ENCOUNTER
Verona Dyer PA-C to [facility]        10/24/24  9:50 AM  If she is willing to see me you can double book my 1240 and she can get the cortisone injection

## 2024-10-24 NOTE — TELEPHONE ENCOUNTER
Patient was very pleased that Verona is able to squeeze her in for the right knee injection.    Future Appointments   Date Time Provider Department Center   10/28/2024 12:40 PM Verona Dyer PA-C [facility] [place]   10/28/2024  1:00 PM Jill Coker MD [id number] [place]

## 2024-10-28 ENCOUNTER — OFFICE VISIT (OUTPATIENT)
Dept: ORTHOPEDICS CLINIC | Facility: CLINIC | Age: 69
End: 2024-10-28
Payer: MEDICARE

## 2024-10-28 ENCOUNTER — LAB ENCOUNTER (OUTPATIENT)
Dept: LAB | Age: 69
End: 2024-10-28
Attending: INTERNAL MEDICINE
Payer: MEDICARE

## 2024-10-28 ENCOUNTER — OFFICE VISIT (OUTPATIENT)
Dept: INTERNAL MEDICINE CLINIC | Facility: CLINIC | Age: 69
End: 2024-10-28

## 2024-10-28 ENCOUNTER — HOSPITAL ENCOUNTER (OUTPATIENT)
Dept: GENERAL RADIOLOGY | Age: 69
Discharge: HOME | End: 2024-10-28
Attending: INTERNAL MEDICINE
Payer: MEDICARE

## 2024-10-28 ENCOUNTER — EKG ENCOUNTER (OUTPATIENT)
Dept: LAB | Age: 69
End: 2024-10-28
Attending: INTERNAL MEDICINE
Payer: MEDICARE

## 2024-10-28 VITALS
SYSTOLIC BLOOD PRESSURE: 118 MMHG | WEIGHT: 205 LBS | HEIGHT: 64 IN | HEART RATE: 63 BPM | DIASTOLIC BLOOD PRESSURE: 80 MMHG | BODY MASS INDEX: 35 KG/M2

## 2024-10-28 VITALS — WEIGHT: 205 LBS | BODY MASS INDEX: 35 KG/M2 | HEIGHT: 64 IN

## 2024-10-28 DIAGNOSIS — M17.11 PRIMARY OSTEOARTHRITIS OF RIGHT KNEE: Primary | ICD-10-CM

## 2024-10-28 DIAGNOSIS — M76.62 ACHILLES TENDINITIS OF LEFT LOWER EXTREMITY: ICD-10-CM

## 2024-10-28 DIAGNOSIS — Z01.818 PREOP EXAMINATION: ICD-10-CM

## 2024-10-28 DIAGNOSIS — M76.822 TIBIALIS POSTERIOR TENDINITIS, LEFT: ICD-10-CM

## 2024-10-28 DIAGNOSIS — Z01.818 PREOP EXAMINATION: Primary | ICD-10-CM

## 2024-10-28 DIAGNOSIS — M17.11 PRIMARY OSTEOARTHRITIS OF RIGHT KNEE: ICD-10-CM

## 2024-10-28 DIAGNOSIS — R25.1 TREMOR: ICD-10-CM

## 2024-10-28 LAB
ALBUMIN SERPL-MCNC: 4.5 G/DL (ref 3.2–4.8)
ALBUMIN/GLOB SERPL: 1.6 {RATIO} (ref 1–2)
ALP LIVER SERPL-CCNC: 62 U/L
ALT SERPL-CCNC: 26 U/L
ANION GAP SERPL CALC-SCNC: 7 MMOL/L (ref 0–18)
AST SERPL-CCNC: 25 U/L (ref ?–34)
ATRIAL RATE: 56 BPM
BASOPHILS # BLD AUTO: 0.06 X10(3) UL (ref 0–0.2)
BASOPHILS NFR BLD AUTO: 0.8 %
BILIRUB SERPL-MCNC: 0.3 MG/DL (ref 0.2–1.1)
BUN BLD-MCNC: 24 MG/DL (ref 9–23)
BUN/CREAT SERPL: 23.1 (ref 10–20)
CALCIUM BLD-MCNC: 10.3 MG/DL (ref 8.7–10.4)
CHLORIDE SERPL-SCNC: 105 MMOL/L (ref 98–112)
CO2 SERPL-SCNC: 29 MMOL/L (ref 21–32)
CREAT BLD-MCNC: 1.04 MG/DL
DEPRECATED RDW RBC AUTO: 42.7 FL (ref 35.1–46.3)
EGFRCR SERPLBLD CKD-EPI 2021: 59 ML/MIN/1.73M2 (ref 60–?)
EOSINOPHIL # BLD AUTO: 0.29 X10(3) UL (ref 0–0.7)
EOSINOPHIL NFR BLD AUTO: 3.8 %
ERYTHROCYTE [DISTWIDTH] IN BLOOD BY AUTOMATED COUNT: 12.8 % (ref 11–15)
FASTING STATUS PATIENT QL REPORTED: NO
GLOBULIN PLAS-MCNC: 2.9 G/DL (ref 2–3.5)
GLUCOSE BLD-MCNC: 99 MG/DL (ref 70–99)
HCT VFR BLD AUTO: 39.1 %
HGB BLD-MCNC: 12.3 G/DL
IMM GRANULOCYTES # BLD AUTO: 0.02 X10(3) UL (ref 0–1)
IMM GRANULOCYTES NFR BLD: 0.3 %
LYMPHOCYTES # BLD AUTO: 2.07 X10(3) UL (ref 1–4)
LYMPHOCYTES NFR BLD AUTO: 27.4 %
MCH RBC QN AUTO: 28.5 PG (ref 26–34)
MCHC RBC AUTO-ENTMCNC: 31.5 G/DL (ref 31–37)
MCV RBC AUTO: 90.7 FL
MONOCYTES # BLD AUTO: 0.73 X10(3) UL (ref 0.1–1)
MONOCYTES NFR BLD AUTO: 9.7 %
NEUTROPHILS # BLD AUTO: 4.38 X10 (3) UL (ref 1.5–7.7)
NEUTROPHILS # BLD AUTO: 4.38 X10(3) UL (ref 1.5–7.7)
NEUTROPHILS NFR BLD AUTO: 58 %
OSMOLALITY SERPL CALC.SUM OF ELEC: 296 MOSM/KG (ref 275–295)
P AXIS: 22 DEGREES
P-R INTERVAL: 188 MS
PLATELET # BLD AUTO: 307 10(3)UL (ref 150–450)
POTASSIUM SERPL-SCNC: 4.5 MMOL/L (ref 3.5–5.1)
PROT SERPL-MCNC: 7.4 G/DL (ref 5.7–8.2)
Q-T INTERVAL: 404 MS
QRS DURATION: 82 MS
QTC CALCULATION (BEZET): 389 MS
R AXIS: 3 DEGREES
RBC # BLD AUTO: 4.31 X10(6)UL
SODIUM SERPL-SCNC: 141 MMOL/L (ref 136–145)
T AXIS: 20 DEGREES
VENTRICULAR RATE: 56 BPM
WBC # BLD AUTO: 7.6 X10(3) UL (ref 4–11)

## 2024-10-28 PROCEDURE — 3074F SYST BP LT 130 MM HG: CPT | Performed by: INTERNAL MEDICINE

## 2024-10-28 PROCEDURE — 36415 COLL VENOUS BLD VENIPUNCTURE: CPT

## 2024-10-28 PROCEDURE — 85025 COMPLETE CBC W/AUTO DIFF WBC: CPT

## 2024-10-28 PROCEDURE — 93005 ELECTROCARDIOGRAM TRACING: CPT

## 2024-10-28 PROCEDURE — 71046 X-RAY EXAM CHEST 2 VIEWS: CPT | Performed by: INTERNAL MEDICINE

## 2024-10-28 PROCEDURE — 99214 OFFICE O/P EST MOD 30 MIN: CPT | Performed by: INTERNAL MEDICINE

## 2024-10-28 PROCEDURE — 93010 ELECTROCARDIOGRAM REPORT: CPT | Performed by: INTERNAL MEDICINE

## 2024-10-28 PROCEDURE — 1125F AMNT PAIN NOTED PAIN PRSNT: CPT | Performed by: INTERNAL MEDICINE

## 2024-10-28 PROCEDURE — 1159F MED LIST DOCD IN RCRD: CPT | Performed by: INTERNAL MEDICINE

## 2024-10-28 PROCEDURE — 3079F DIAST BP 80-89 MM HG: CPT | Performed by: INTERNAL MEDICINE

## 2024-10-28 PROCEDURE — 3008F BODY MASS INDEX DOCD: CPT | Performed by: INTERNAL MEDICINE

## 2024-10-28 PROCEDURE — 80053 COMPREHEN METABOLIC PANEL: CPT

## 2024-10-28 RX ORDER — TRIAMCINOLONE ACETONIDE 40 MG/ML
40 INJECTION, SUSPENSION INTRA-ARTICULAR; INTRAMUSCULAR ONCE
Status: COMPLETED | OUTPATIENT
Start: 2024-10-28 | End: 2024-10-28

## 2024-10-28 RX ADMIN — TRIAMCINOLONE ACETONIDE 40 MG: 40 INJECTION, SUSPENSION INTRA-ARTICULAR; INTRAMUSCULAR at 12:22:00

## 2024-10-28 NOTE — PROGRESS NOTES
[facility] Progress Note      Chief Complaint:  Right knee pain      Subjective: Jayleen Gillette is a 69 year old female who is here for reevaluation of her right knee.  She saw Dr. Childs approximately 4-1/2 months ago at which time a cortisone injection was administered to the right knee for her degenerative joint disease.  At that time, this provided relief for about 2 months and pain has slowly recurred.  She does have an upcoming surgery with Dr. Arrington on her left ankle which will require her to be nonweightbearing on the left lower extremity for 8 weeks.  She is very nervous about her right knee pain as this will be taking most of the load after the surgery.  She is interested in repeating the cortisone injection today.      Objective: Exam of the right knee and lower extremity reveals that the overlying skin is intact.  No palpable effusion.  Sensation is present to light touch.  She has full extension.      Assessment: Right knee degenerative joint disease      Plan: It would be reasonable to go ahead with a right knee cortisone injection today as this has been beneficial in the past.  She does have an upcoming surgery with Dr. Arrington and is hoping for pain relief as the right leg is going to take all of the load for weightbearing.  She would like to proceed and will follow-up with us on an as-needed basis with recurrent or worsening symptoms, questions or concerns.    Risks, benefits, and alternatives to the cortisone injection were discussed including but not limited to needle infection, steroid flare up or failure to improve. The patient would like to proceed and under meticulous sterile technique 4cc of Xylocaine, and 40 mg of Kenalog were injected to the right knee via a lateral patellofemoral approach.  A band aid was placed over the injection site and they tolerated the procedure well.  Patient was instructed to follow up with any adverse reactions.            Verona Dyer PA-C  Orthopedic  Surgery   [address] [place] [id number]   t: [phone]  f: [phone]           This document was partially prepared using Dragon Medical voice recognition software.  Although every attempt is made to correct errors during dictation, discrepancies may still exist. Please contact me with any questions or clarifications.

## 2024-10-29 NOTE — PROGRESS NOTES
Subjective:     Patient ID: Jayleen Gillette is a 69 year old female presents for preop evaluation    HPI  Patient scheduled for left ankle surgery by Dr. Arrington on November 14, 2024, she failed conservative treatment after injury 3 months ago, diagnosed with Achilles tendinitis and tibialis posterior tendinitis.  Postsurgical recovery will require 8 weeks of nonweightbearing.  She has chronic right knee pain due to osteoarthritis, she received steroidal injection today hoping that will help to decrease amount of pain in the right leg.  She is in the process of neurological evaluation, tremors improved with increase of propranolol.  Currently taking 40 mg twice a day and tremors significantly decreased.  She has not fallen lately, walks very carefully    Review of Systems       Constitutional:  Negative for decreased activity, fever, irritability and lethargy  Cardiovascular:  Negative for chest pain and irregular heartbeat/palpitations  Respiratory:  Negative for cough, dyspnea and wheezing.  Eyes:  Negative for eye discharge and vision loss  Endocrine:  Negative for polydipsia and polyphagia  Integumentary:  Negative for pruritus and rash  Neurological:  Negative for gait disturbance, paresthesias.   Psychiatric:  Negative for inappropriate interaction and psychiatric symptoms  Current Outpatient Medications   Medication Sig Dispense Refill    Na Sulfate-K Sulfate-Mg Sulf (SUPREP BOWEL PREP KIT) 17.5-3.13-1.6 GM/177ML Oral Solution Take as discussed in clinic 1 each 0    aspirin (ASPIRIN 81) 81 MG Oral Tab EC Take 1 tablet (81 mg total) by mouth daily. 90 tablet 3    propranolol 40 MG Oral Tab Take 1 tablet (40 mg total) by mouth 2 (two) times daily. 180 tablet 3    ibuprofen 600 MG Oral Tab Take 1 tablet (600 mg total) by mouth every 8 (eight) hours as needed for Pain. 180 tablet 3    triamcinolone 0.1 % External Cream Apply 1 Application topically 2 (two) times daily. 420 Undefined 3    ketoconazole 2 % External  Cream Apply 1 Application topically 2 (two) times daily. Apply to affected area 2 times daily. 60 Undefined 3    omeprazole 20 MG Oral Capsule Delayed Release Take 1 capsule (20 mg total) by mouth before breakfast. 90 capsule 3    Multiple Vitamins-Minerals (MULTI-VITAMIN/MINERALS) Oral Tab Take 1 tablet by mouth daily.      EPINEPHrine 0.3 MG/0.3ML Injection Solution Auto-injector Inject 0.3 mL (1 each total) into the skin one time.      cyclobenzaprine 10 MG Oral Tab Take 1 tablet (10 mg total) by mouth 3 (three) times daily as needed for Muscle spasms. 90 tablet 0    Calcium Carbonate Antacid 500 MG Oral Chew Tab Chew 1 tablet (500 mg total) by mouth as needed.      cetirizine 10 MG Oral Tab Take 1 tablet (10 mg total) by mouth as needed.       Allergies:Allergies[1]    Past Medical History:    Bronchitis    Colon abnormality    spastic    Depression    Seizure disorder (HCC)    TIA (transient ischemic attack)    Tremor    Ulcer    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HCC)      Past Surgical History:   Procedure Laterality Date    Hysterectomy  1984    Wrist fracture surgery      growth removed 1995      Family History   Problem Relation Age of Onset    Breast Cancer Sister 35    Breast Cancer Maternal Aunt 60    Breast Cancer Paternal Aunt 60      Social History:   Social History     Socioeconomic History    Marital status: Single   Tobacco Use    Smoking status: Never    Smokeless tobacco: Never   Vaping Use    Vaping status: Never Used   Substance and Sexual Activity    Alcohol use: No    Drug use: Never    Sexual activity: Not Currently   Other Topics Concern    Pt has a pacemaker No    Pt has a defibrillator No    Reaction to local anesthetic No    Caffeine Concern Yes     Comment: 4 cups of coffee/day    Exercise Yes     Comment: water exercise        /80 (BP Location: Left arm, Patient Position: Sitting, Cuff Size: large)   Pulse 63   Ht 5' 4\" (1.626 m)   Wt 205 lb (93 kg)   BMI 35.19 kg/m²     Physical Exam  Constitutional:       Appearance: Normal appearance.   HENT:      Head: Normocephalic and atraumatic.   Eyes:      General: No scleral icterus.     Extraocular Movements: Extraocular movements intact.      Conjunctiva/sclera: Conjunctivae normal.      Pupils: Pupils are equal, round, and reactive to light.   Cardiovascular:      Rate and Rhythm: Normal rate and regular rhythm.      Heart sounds: No murmur heard.     No gallop.   Pulmonary:      Effort: Pulmonary effort is normal. No respiratory distress.      Breath sounds: No wheezing or rhonchi.   Musculoskeletal:         General: Normal range of motion.      Cervical back: Normal range of motion and neck supple.      Right lower leg: No edema.      Left lower leg: No edema.   Lymphadenopathy:      Cervical: No cervical adenopathy.   Skin:     General: Skin is warm.      Coloration: Skin is not jaundiced.   Neurological:      General: No focal deficit present.      Mental Status: She is alert and oriented to person, place, and time. Mental status is at baseline.   Psychiatric:         Mood and Affect: Mood normal.         Behavior: Behavior normal.         Thought Content: Thought content normal.         Judgment: Judgment normal.         Assessment & Plan:   1. Preop examination    2. Achilles tendinitis of left lower extremity    3. Tibialis posterior tendinitis, left    4. Tremor    5. Primary osteoarthritis of right knee    Check labs, EKG and chest x-ray.  Clearance will depends on results of neurological workup    Orders Placed This Encounter   Procedures    CBC With Differential With Platelet    Comp Metabolic Panel (14)       Meds This Visit:  Requested Prescriptions      No prescriptions requested or ordered in this encounter       Imaging & Referrals:  None            [1]   Allergies  Allergen Reactions    Acetaminophen HIVES     Other reaction(s): ACETAMINOPHEN    Erythromycin ANGIOEDEMA    Gentamicin HIVES     Other reaction(s):  GENTAMICIN    Latex HIVES and RASH    Neomycin HIVES    Penicillins HIVES    Sulfa Antibiotics NAUSEA AND VOMITING    Adhesive Tape     Corn-Related Products RASH    Iodine (Topical) RASH     Other reaction(s): IODINE

## 2024-10-31 ENCOUNTER — HOSPITAL ENCOUNTER (OUTPATIENT)
Dept: MRI IMAGING | Age: 69
Discharge: HOME | End: 2024-10-31
Attending: Other
Payer: MEDICARE

## 2024-10-31 DIAGNOSIS — Q85.83 VHL (VON HIPPEL-LINDAU SYNDROME) (HCC): ICD-10-CM

## 2024-10-31 PROCEDURE — A9575 INJ GADOTERATE MEGLUMI 0.1ML: HCPCS | Performed by: OTHER

## 2024-10-31 PROCEDURE — 70553 MRI BRAIN STEM W/O & W/DYE: CPT | Performed by: OTHER

## 2024-10-31 PROCEDURE — 72156 MRI NECK SPINE W/O & W/DYE: CPT | Performed by: OTHER

## 2024-10-31 RX ORDER — GADOTERATE MEGLUMINE 376.9 MG/ML
20 INJECTION INTRAVENOUS
Status: COMPLETED | OUTPATIENT
Start: 2024-10-31 | End: 2024-10-31

## 2024-10-31 RX ADMIN — GADOTERATE MEGLUMINE 19 ML: 376.9 INJECTION INTRAVENOUS at 14:45:00

## 2024-11-04 ENCOUNTER — TELEPHONE (OUTPATIENT)
Dept: NEUROLOGY | Facility: CLINIC | Age: 69
End: 2024-11-04

## 2024-11-04 NOTE — TELEPHONE ENCOUNTER
Patient return called,  Spoke with pt  given her MRI of the Spine and Crescencio being a normal results.     Patient understood results given, has no future questions at this time.     Upcoming appointment;  OPTHOMALOGY 11/18/2024  AUDIOLOGY 11/11/2024

## 2024-11-04 NOTE — PROGRESS NOTES
Patient return called,  Spoke with pt  given her MRI of the Spine and Crescencio being a normal results.     Patient understood results given, has no future questions at this time.     Upcoming appointment;  OPTHOMALOGY 11/18/2024  AUDIOLOGY 11/11/2024 Called mom, conveyed following message from provider.  Mom verbalized understanding.  No questions or concerns at this time.        ----- Message from Jolie Clark PA-C sent at 11/22/2023  5:04 PM CST -----  Please call patient urine dip showed few bacteria.  Given initial symptoms she should complete the antibiotic.  If not improving she should see her PCP.  Please send for culture.

## 2024-11-04 NOTE — TELEPHONE ENCOUNTER
----- Message from Scott Pollard sent at 11/1/2024 10:22 AM CDT -----  Please let patient know that MRI brain didn't show significant changes compared to 2021 MRI.

## 2024-11-04 NOTE — TELEPHONE ENCOUNTER
Called [phone] left a message to return call for Dr Pollard test results.   Ask for a return call at main office [phone].

## 2024-11-11 ENCOUNTER — TELEPHONE (OUTPATIENT)
Dept: INTERNAL MEDICINE CLINIC | Facility: CLINIC | Age: 69
End: 2024-11-11

## 2024-11-11 ENCOUNTER — TELEPHONE (OUTPATIENT)
Dept: ORTHOPEDICS CLINIC | Facility: CLINIC | Age: 69
End: 2024-11-11

## 2024-11-11 ENCOUNTER — OFFICE VISIT (OUTPATIENT)
Facility: LOCATION | Age: 69
End: 2024-11-11

## 2024-11-11 DIAGNOSIS — H91.8X9 OTHER SPECIFIED FORMS OF HEARING LOSS, UNSPECIFIED LATERALITY: Primary | ICD-10-CM

## 2024-11-11 DIAGNOSIS — H90.42 SENSORINEURAL HEARING LOSS (SNHL) OF LEFT EAR WITH UNRESTRICTED HEARING OF RIGHT EAR: ICD-10-CM

## 2024-11-11 DIAGNOSIS — Z01.10 EVALUATION OF HEARING IMPAIRMENT: Primary | ICD-10-CM

## 2024-11-11 RX ORDER — FOLIC ACID 0.8 MG
800 TABLET ORAL DAILY
COMMUNITY

## 2024-11-11 NOTE — TELEPHONE ENCOUNTER
K Sas: Patient seen by audiologist already. She will need a referral to ENT as the next step.    She needs a specific test for the hearing aid itself.

## 2024-11-11 NOTE — TELEPHONE ENCOUNTER
Please advise patient that she needs to come in and discuss with . He would need to re-evaluate.

## 2024-11-11 NOTE — TELEPHONE ENCOUNTER
Contacted patient, let her know an in office appointment would be needed to re-evaluate her right knee. Patient stated an appointment is not necessary due to her upcoming surgery on 11/14/24 with Dr. Arrington and that she's not looking to schedule an appointment. Patient stated she thought since Dr. Childs and Dr. Arrington work out of the same location, they can discuss next steps amongst each other, and Dr. Arrington can discuss with her before/after her surgery.

## 2024-11-11 NOTE — TELEPHONE ENCOUNTER
Dr Coker, please advise    Patient (name and  verified) is calling for a referral for a hearing aid evaluation referral requested by the Audiologist. Patient needs to have the referral for her insurance to cover the evaluation for the hearing aid. Patient was instructed to have the hearing aid evaluation completed at the hospital.     See office notes from Maylin Roman on 24

## 2024-11-11 NOTE — TELEPHONE ENCOUNTER
Patient left a message on perfect serve regarding right knee injection not helping. Contacted patient, she declined scheduling a follow up appointment, wanted to discuss next steps and wanted to let Dr. Childs know that she is suppose to have surgery this Thursday with Dr. Arrington.

## 2024-11-12 ENCOUNTER — TELEPHONE (OUTPATIENT)
Dept: NEUROLOGY | Facility: CLINIC | Age: 69
End: 2024-11-12

## 2024-11-12 NOTE — TELEPHONE ENCOUNTER
Returned PC to the patient. Confirmed that Dr. Pollard cleared her for surgery on 10/14/24. She verbalized understanding.

## 2024-11-13 ENCOUNTER — NURSE TRIAGE (OUTPATIENT)
Dept: INTERNAL MEDICINE CLINIC | Facility: CLINIC | Age: 69
End: 2024-11-13

## 2024-11-13 ENCOUNTER — TELEPHONE (OUTPATIENT)
Dept: ORTHOPEDICS CLINIC | Facility: CLINIC | Age: 69
End: 2024-11-13

## 2024-11-13 NOTE — TELEPHONE ENCOUNTER
Per Dr Mccord-      \"Spoke to the patient. She will continue with her medications and present for surgery tomorrow. She states she thinks her allergies are acting up.\"     Noted.

## 2024-11-13 NOTE — TELEPHONE ENCOUNTER
Spoke with patient and she states she woke up in the middle of the night and with heavy sinus drainage, coughing, runny nose and sore throat. Patient reports having seasonal allergies. She denies any fever, chills, malaise, fatigue. Patient has scheduled surgery with Dr Mccord on 11/4. Patient wanting to know if ok to proceed with surgery also if she can take glycopyrrolate(anticholinergic drug) and Azelastine nasal solution today to see if symptoms improve.

## 2024-11-13 NOTE — DISCHARGE INSTRUCTIONS
Dr. Arrington's Discharge Instructions    Keep your splint clean, dry, and intact. Do not remove until you follow up in the office.     Ice and elevate your leg. You can place a bag full of ice directly on your splint. When you elevate your leg, put the pillow under your calf and leave your heel free.     Do not put weight on the operative leg. Use crutches, walker, or a knee scooter to help get around.    **If the splint feels too tight- LOOSEN the splint!** You may take off the ace bandage if necessary and reapply it looser. After surgery, you tend to swell which may make the splint feel too tight.    Call the office if you have increased pain/swelling that is not being managed by your pain medicine. You should also call the office if there is increased redness or swelling going beyond your dressing. If there is bleeding that is coming through your dressing, reinforce it with guaze bandages and an ace bandage. Please call the office with any questions.    You may take the tramadol for pain relief. This can be taken every 6 hours as needed for pain. If you have breakthrough pain and it's not time for your tramadol, you may take 600 mg of ibuprofen every 6 hours as well if you don't have any kidney problems or history of problems with NSAIDs.     You should take 81 mg of aspirin twice a day for 1 month after surgery to prevent blood clots.     You should schedule a follow up appointment for 2 weeks after surgery.       Call Dr. Arrington's office at [phone] with any questions.         HOME INSTRUCTIONS  [facility] HOME CARE INSTRUCTIONS: POST-OP ANESTHESIA  The medication that you received for sedation or general anesthesia can last up to 24 hours. Your judgment and reflexes may be altered, even if you feel like your normal self.      We Recommend:   Do not drive any motor vehicle or bicycle   Avoid mowing the lawn, playing sports, or working with power tools/applicances (power saws, electric knives or mixers)   That  you have someone stay with you on your first night home   Do not drink alcohol or take sleeping pills or tranquilizers   Do not sign legal documents within 24 hours of your procedure   If you had a nerve block for your surgery, take extra care not to put any pressure on your arm or hand for 24 hours    It is normal:  For you to have a sore throat if you had a breathing tube during surgery (while you were asleep!). The sore throat should get better within 48 hours. You can gargle with warm salt water (1/2 tsp in 4 oz warm water) or use a throat lozenge for comfort  To feel muscle aches or soreness especially in the abdomen, chest or neck. The achy feeling should go away in the next 24 hours  To feel weak, sleepy or \"wiped out\". Your should start feeling better in the next 24 hours.   To experience mild discomforts such as sore lip or tongue, headache, cramps, gas pains or a bloated feeling in your abdomen.   To experience mild back pain or soreness for a day or two if you had spinal or epidural anesthesia.   If you had laparoscopic surgery, to feel shoulder pain or discomfort on the day of surgery.   For some patients to have nausea after surgery/anesthesia    If you feel nausea or experience vomiting:   Try to move around less.   Eat less than usual or drink only liquids until the next morning   Nausea should resolve in about 24 hours    If you have a problem when you are at home:    Call your surgeons office   Discharge Instructions: After Your Surgery  You’ve just had surgery. During surgery, you were given medicine called anesthesia to keep you relaxed and free of pain. After surgery, you may have some pain or nausea. This is common. Here are some tips for feeling better and getting well after surgery.   Going home  Your healthcare provider will show you how to take care of yourself when you go home. They'll also answer your questions. Have an adult family member or friend drive you home. For the first 24 hours  after your surgery:   Don't drive or use heavy equipment.  Don't make important decisions or sign legal papers.  Take medicines as directed.  Don't drink alcohol.  Have someone stay with you, if needed. They can watch for problems and help keep you safe.  Be sure to go to all follow-up visits with your healthcare provider. And rest after your surgery for as long as your provider tells you to.   Coping with pain  If you have pain after surgery, pain medicine will help you feel better. Take it as directed, before pain becomes severe. Also, ask your healthcare provider or pharmacist about other ways to control pain. This might be with heat, ice, or relaxation. And follow any other instructions your surgeon or nurse gives you.      Stay on schedule with your medicine.     Tips for taking pain medicine  To get the best relief possible, remember these points:   Pain medicines can upset your stomach. Taking them with a little food may help.  Most pain relievers taken by mouth need at least 20 to 30 minutes to start to work.  Don't wait till your pain becomes severe before you take your medicine. Try to time your medicine so that you can take it before starting an activity. This might be before you get dressed, go for a walk, or sit down for dinner.  Constipation is a common side effect of some pain medicines. Call your healthcare provider before taking any medicines such as laxatives or stool softeners to help ease constipation. Also ask if you should skip any foods. Drinking lots of fluids and eating foods such as fruits and vegetables that are high in fiber can also help. Remember, don't take laxatives unless your surgeon has prescribed them.  Drinking alcohol and taking pain medicine can cause dizziness and slow your breathing. It can even be deadly. Don't drink alcohol while taking pain medicine.  Pain medicine can make you react more slowly to things. Don't drive or run machinery while taking pain medicine.  Your  healthcare provider may tell you to take acetaminophen to help ease your pain. Ask them how much you're supposed to take each day. Acetaminophen or other pain relievers may interact with your prescription medicines or other over-the-counter (OTC) medicines. Some prescription medicines have acetaminophen and other ingredients in them. Using both prescription and OTC acetaminophen for pain can cause you to accidentally overdose. Read the labels on your OTC medicines with care. This will help you to clearly know the list of ingredients, how much to take, and any warnings. It may also help you not take too much acetaminophen. If you have questions or don't understand the information, ask your pharmacist or healthcare provider to explain it to you before you take the OTC medicine.   Managing nausea  Some people have an upset stomach (nausea) after surgery. This is often because of anesthesia, pain, or pain medicine, less movement of food in the stomach, or the stress of surgery. These tips will help you handle nausea and eat healthy foods as you get better. If you were on a special food plan before surgery, ask your healthcare provider if you should follow it while you get better. Check with your provider on how your eating should progress. It may depend on the surgery you had. These general tips may help:   Don't push yourself to eat. Your body will tell you when to eat and how much.  Start off with clear liquids and soup. They're easier to digest.  Next try semi-solid foods as you feel ready. These include mashed potatoes, applesauce, and gelatin.  Slowly move to solid foods. Don’t eat fatty, rich, or spicy foods at first.  Don't force yourself to have 3 large meals a day. Instead eat smaller amounts more often.  Take pain medicines with a small amount of solid food, such as crackers or toast. This helps prevent nausea.  When to call your healthcare provider  Call your healthcare provider right away if you have any of  these:   You still have too much pain, or the pain gets worse, after taking the medicine. The medicine may not be strong enough. Or there may be a complication from the surgery.  You feel too sleepy, dizzy, or groggy. The medicine may be too strong.  Side effects such as nausea or vomiting. Your healthcare provider may advise taking other medicines to .  Skin changes such as rash, itching, or hives. This may mean you have an allergic reaction. Your provider may advise taking other medicines.  The incision looks different (for instance, part of it opens up).  Bleeding or fluid leaking from the incision site, and weren't told to expect that.  Fever of 100.4°F (38°C) or higher, or as directed by your provider.  Call 911  Call 911 right away if you have:   Trouble breathing  Facial swelling    If you have obstructive sleep apnea   You were given anesthesia medicine during surgery to keep you comfortable and free of pain. After surgery, you may have more apnea spells because of this medicine and other medicines you were given. The spells may last longer than normal.    At home:  Keep using the continuous positive airway pressure (CPAP) device when you sleep. Unless your healthcare provider tells you not to, use it when you sleep, day or night. CPAP is a common device used to treat obstructive sleep apnea.  Talk with your provider before taking any pain medicine, muscle relaxants, or sedatives. Your provider will tell you about the possible dangers of taking these medicines.  Contact your provider if your sleeping changes a lot even when taking medicines as directed.  [employer] last reviewed this educational content on 10/1/2021  © 1565-8982 [employer]. All rights reserved. This information is not intended as a substitute for professional medical care. Always follow your healthcare professional's instructions.

## 2024-11-13 NOTE — TELEPHONE ENCOUNTER
Please reply to pool: EM RN TRIAGE  Action Requested: Summary for Provider     []  Critical Lab, Recommendations Needed  [] Need Additional Advice  [x]   LEVI    []   Need Orders  [] Need Medications Sent to Pharmacy  []  Other     SUMMARY: Patient contacts clinic reporting she developed a cough last night with headache and chills.  Temperature is 96.7 this morning.  She is producing phlegm and having some difficulty clearing secretions.  Denies chest pain or shortness of breath.  She is scheduled for surgery tomorrow.  Has not checked covid test, states \"I'm not going to because I know I don't have covid\".  Nurse advised that without a test she does not know this and recommended home test.  Recommended she report her symptoms to surgeon as surgery may need to be postponed.  She is hydrating. She will call back for appointment if symptoms progress.  Dr. John Paul SIMS.     Reason for call: Cough  Onset: Data Unavailable                       Reason for Disposition   Cough with no complications    Protocols used: Cough-A-OH

## 2024-11-13 NOTE — TELEPHONE ENCOUNTER
Patient states she woke up today with a bad headache and a sinus drip/cough. Patient states her temperature is 36.4 C. Patient also asking if she can take an antihistamine and anticollergic medications she got from an EN for these allergy symptoms. Patient asking if she can still have surgery tomorrow 11/13. Please call.

## 2024-11-14 ENCOUNTER — APPOINTMENT (OUTPATIENT)
Dept: GENERAL RADIOLOGY | Facility: HOSPITAL | Age: 69
End: 2024-11-14
Attending: ORTHOPAEDIC SURGERY
Payer: MEDICARE

## 2024-11-14 ENCOUNTER — ANESTHESIA (OUTPATIENT)
Dept: SURGERY | Facility: HOSPITAL | Age: 69
End: 2024-11-14
Payer: MEDICARE

## 2024-11-14 ENCOUNTER — HOSPITAL ENCOUNTER (OUTPATIENT)
Facility: HOSPITAL | Age: 69
Setting detail: HOSPITAL OUTPATIENT SURGERY
Discharge: HOME | End: 2024-11-14
Attending: ORTHOPAEDIC SURGERY | Admitting: ORTHOPAEDIC SURGERY
Payer: MEDICARE

## 2024-11-14 ENCOUNTER — ANESTHESIA EVENT (OUTPATIENT)
Dept: SURGERY | Facility: HOSPITAL | Age: 69
End: 2024-11-14
Payer: MEDICARE

## 2024-11-14 VITALS
DIASTOLIC BLOOD PRESSURE: 70 MMHG | RESPIRATION RATE: 18 BRPM | SYSTOLIC BLOOD PRESSURE: 127 MMHG | OXYGEN SATURATION: 98 % | BODY MASS INDEX: 35.26 KG/M2 | WEIGHT: 199 LBS | TEMPERATURE: 98 F | HEIGHT: 63 IN | HEART RATE: 71 BPM

## 2024-11-14 DIAGNOSIS — M76.62 ACHILLES TENDINITIS, LEFT LEG: Primary | ICD-10-CM

## 2024-11-14 PROCEDURE — 01NG0ZZ RELEASE TIBIAL NERVE, OPEN APPROACH: ICD-10-PCS | Performed by: ORTHOPAEDIC SURGERY

## 2024-11-14 PROCEDURE — 0LNP0ZZ RELEASE LEFT LOWER LEG TENDON, OPEN APPROACH: ICD-10-PCS | Performed by: ORTHOPAEDIC SURGERY

## 2024-11-14 PROCEDURE — 27680 RELEASE OF LOWER LEG TENDON: CPT | Performed by: ORTHOPAEDIC SURGERY

## 2024-11-14 PROCEDURE — 0LXW0ZZ TRANSFER LEFT FOOT TENDON, OPEN APPROACH: ICD-10-PCS | Performed by: ORTHOPAEDIC SURGERY

## 2024-11-14 PROCEDURE — 28300 INCISION OF HEEL BONE: CPT | Performed by: ORTHOPAEDIC SURGERY

## 2024-11-14 PROCEDURE — 0YQL0ZZ REPAIR LEFT ANKLE REGION, OPEN APPROACH: ICD-10-PCS | Performed by: ORTHOPAEDIC SURGERY

## 2024-11-14 PROCEDURE — 0LQP0ZZ REPAIR LEFT LOWER LEG TENDON, OPEN APPROACH: ICD-10-PCS | Performed by: ORTHOPAEDIC SURGERY

## 2024-11-14 PROCEDURE — 27691 REVISE LOWER LEG TENDON: CPT | Performed by: ORTHOPAEDIC SURGERY

## 2024-11-14 PROCEDURE — 27698 REPAIR OF ANKLE LIGAMENT: CPT | Performed by: ORTHOPAEDIC SURGERY

## 2024-11-14 PROCEDURE — 27654 REPAIR OF ACHILLES TENDON: CPT | Performed by: ORTHOPAEDIC SURGERY

## 2024-11-14 PROCEDURE — 0Q8M0ZZ DIVISION OF LEFT TARSAL, OPEN APPROACH: ICD-10-PCS | Performed by: ORTHOPAEDIC SURGERY

## 2024-11-14 PROCEDURE — 3E0T3BZ INTRODUCTION OF ANESTHETIC AGENT INTO PERIPHERAL NERVES AND PLEXI, PERCUTANEOUS APPROACH: ICD-10-PCS | Performed by: ANESTHESIOLOGY

## 2024-11-14 PROCEDURE — 76000 FLUOROSCOPY <1 HR PHYS/QHP: CPT | Performed by: ORTHOPAEDIC SURGERY

## 2024-11-14 PROCEDURE — 0LQW0ZZ REPAIR LEFT FOOT TENDON, OPEN APPROACH: ICD-10-PCS | Performed by: ORTHOPAEDIC SURGERY

## 2024-11-14 DEVICE — SCREW, HEADLESS, 6.5 X 50 X 16MM
Type: IMPLANTABLE DEVICE | Site: FOOT | Status: FUNCTIONAL
Brand: MEDLINE UNITE

## 2024-11-14 DEVICE — SCREW, HEADLESS, 6.5 X 55 X 16MM
Type: IMPLANTABLE DEVICE | Site: FOOT | Status: FUNCTIONAL
Brand: MEDLINE UNITE

## 2024-11-14 DEVICE — IMPLANTABLE DEVICE
Type: IMPLANTABLE DEVICE | Site: FOOT | Status: FUNCTIONAL
Brand: G-FORCE

## 2024-11-14 DEVICE — FLEXBAND 30CM DEVICE WITH ANCHORS AND INSTRUMENTS KIT
Type: IMPLANTABLE DEVICE | Site: FOOT | Status: FUNCTIONAL
Brand: TWIST  KIT

## 2024-11-14 RX ORDER — FAMOTIDINE 10 MG/ML
20 INJECTION, SOLUTION INTRAVENOUS ONCE
Status: DISCONTINUED | OUTPATIENT
Start: 2024-11-14 | End: 2024-11-14 | Stop reason: HOSPADM

## 2024-11-14 RX ORDER — ROCURONIUM BROMIDE 10 MG/ML
INJECTION, SOLUTION INTRAVENOUS AS NEEDED
Status: DISCONTINUED | OUTPATIENT
Start: 2024-11-14 | End: 2024-11-14 | Stop reason: SURG

## 2024-11-14 RX ORDER — ASPIRIN 81 MG/1
81 TABLET ORAL 2 TIMES DAILY
Qty: 90 TABLET | Refills: 3 | Status: SHIPPED | OUTPATIENT
Start: 2024-11-14

## 2024-11-14 RX ORDER — MORPHINE SULFATE 4 MG/ML
2 INJECTION, SOLUTION INTRAMUSCULAR; INTRAVENOUS EVERY 10 MIN PRN
Status: DISCONTINUED | OUTPATIENT
Start: 2024-11-14 | End: 2024-11-14

## 2024-11-14 RX ORDER — ONDANSETRON 2 MG/ML
INJECTION INTRAMUSCULAR; INTRAVENOUS AS NEEDED
Status: DISCONTINUED | OUTPATIENT
Start: 2024-11-14 | End: 2024-11-14 | Stop reason: SURG

## 2024-11-14 RX ORDER — NALOXONE HYDROCHLORIDE 0.4 MG/ML
0.08 INJECTION, SOLUTION INTRAMUSCULAR; INTRAVENOUS; SUBCUTANEOUS AS NEEDED
Status: DISCONTINUED | OUTPATIENT
Start: 2024-11-14 | End: 2024-11-14

## 2024-11-14 RX ORDER — SODIUM CHLORIDE, SODIUM LACTATE, POTASSIUM CHLORIDE, CALCIUM CHLORIDE 600; 310; 30; 20 MG/100ML; MG/100ML; MG/100ML; MG/100ML
INJECTION, SOLUTION INTRAVENOUS CONTINUOUS
Status: DISCONTINUED | OUTPATIENT
Start: 2024-11-14 | End: 2024-11-14

## 2024-11-14 RX ORDER — METOCLOPRAMIDE HYDROCHLORIDE 5 MG/ML
10 INJECTION INTRAMUSCULAR; INTRAVENOUS EVERY 8 HOURS PRN
Status: DISCONTINUED | OUTPATIENT
Start: 2024-11-14 | End: 2024-11-14

## 2024-11-14 RX ORDER — GLYCOPYRROLATE 0.2 MG/ML
INJECTION, SOLUTION INTRAMUSCULAR; INTRAVENOUS AS NEEDED
Status: DISCONTINUED | OUTPATIENT
Start: 2024-11-14 | End: 2024-11-14 | Stop reason: SURG

## 2024-11-14 RX ORDER — MIDAZOLAM HYDROCHLORIDE 1 MG/ML
INJECTION INTRAMUSCULAR; INTRAVENOUS
Status: COMPLETED | OUTPATIENT
Start: 2024-11-14 | End: 2024-11-14

## 2024-11-14 RX ORDER — METOCLOPRAMIDE 10 MG/1
10 TABLET ORAL ONCE
Status: COMPLETED | OUTPATIENT
Start: 2024-11-14 | End: 2024-11-14

## 2024-11-14 RX ORDER — FAMOTIDINE 20 MG/1
20 TABLET, FILM COATED ORAL ONCE
Status: DISCONTINUED | OUTPATIENT
Start: 2024-11-14 | End: 2024-11-14 | Stop reason: HOSPADM

## 2024-11-14 RX ORDER — GLYCOPYRROLATE 1 MG/1
1 TABLET ORAL 3 TIMES DAILY
COMMUNITY

## 2024-11-14 RX ORDER — HYDROMORPHONE HYDROCHLORIDE 1 MG/ML
0.4 INJECTION, SOLUTION INTRAMUSCULAR; INTRAVENOUS; SUBCUTANEOUS EVERY 2 HOUR PRN
Status: DISCONTINUED | OUTPATIENT
Start: 2024-11-14 | End: 2024-11-14

## 2024-11-14 RX ORDER — DEXAMETHASONE SODIUM PHOSPHATE 4 MG/ML
VIAL (ML) INJECTION AS NEEDED
Status: DISCONTINUED | OUTPATIENT
Start: 2024-11-14 | End: 2024-11-14 | Stop reason: SURG

## 2024-11-14 RX ORDER — TRAMADOL HYDROCHLORIDE 50 MG/1
50 TABLET ORAL ONCE
Status: COMPLETED | OUTPATIENT
Start: 2024-11-14 | End: 2024-11-14

## 2024-11-14 RX ORDER — ROPIVACAINE HYDROCHLORIDE 5 MG/ML
INJECTION, SOLUTION EPIDURAL; INFILTRATION; PERINEURAL
Status: COMPLETED | OUTPATIENT
Start: 2024-11-14 | End: 2024-11-14

## 2024-11-14 RX ORDER — DEXAMETHASONE SODIUM PHOSPHATE 10 MG/ML
INJECTION, SOLUTION INTRAMUSCULAR; INTRAVENOUS
Status: COMPLETED | OUTPATIENT
Start: 2024-11-14 | End: 2024-11-14

## 2024-11-14 RX ORDER — METOCLOPRAMIDE HYDROCHLORIDE 5 MG/ML
10 INJECTION INTRAMUSCULAR; INTRAVENOUS ONCE
Status: COMPLETED | OUTPATIENT
Start: 2024-11-14 | End: 2024-11-14

## 2024-11-14 RX ORDER — HYDROMORPHONE HYDROCHLORIDE 1 MG/ML
0.6 INJECTION, SOLUTION INTRAMUSCULAR; INTRAVENOUS; SUBCUTANEOUS EVERY 5 MIN PRN
Status: DISCONTINUED | OUTPATIENT
Start: 2024-11-14 | End: 2024-11-14

## 2024-11-14 RX ORDER — AZELASTINE 1 MG/ML
1 SPRAY, METERED NASAL 2 TIMES DAILY
COMMUNITY

## 2024-11-14 RX ORDER — MORPHINE SULFATE 4 MG/ML
4 INJECTION, SOLUTION INTRAMUSCULAR; INTRAVENOUS EVERY 10 MIN PRN
Status: DISCONTINUED | OUTPATIENT
Start: 2024-11-14 | End: 2024-11-14

## 2024-11-14 RX ORDER — HYDROMORPHONE HYDROCHLORIDE 1 MG/ML
0.2 INJECTION, SOLUTION INTRAMUSCULAR; INTRAVENOUS; SUBCUTANEOUS EVERY 2 HOUR PRN
Status: DISCONTINUED | OUTPATIENT
Start: 2024-11-14 | End: 2024-11-14

## 2024-11-14 RX ORDER — LIDOCAINE HYDROCHLORIDE 10 MG/ML
INJECTION, SOLUTION EPIDURAL; INFILTRATION; INTRACAUDAL; PERINEURAL AS NEEDED
Status: DISCONTINUED | OUTPATIENT
Start: 2024-11-14 | End: 2024-11-14 | Stop reason: SURG

## 2024-11-14 RX ORDER — HYDROMORPHONE HYDROCHLORIDE 1 MG/ML
0.4 INJECTION, SOLUTION INTRAMUSCULAR; INTRAVENOUS; SUBCUTANEOUS EVERY 5 MIN PRN
Status: DISCONTINUED | OUTPATIENT
Start: 2024-11-14 | End: 2024-11-14

## 2024-11-14 RX ORDER — LIDOCAINE HYDROCHLORIDE 10 MG/ML
INJECTION, SOLUTION INFILTRATION; PERINEURAL
Status: COMPLETED | OUTPATIENT
Start: 2024-11-14 | End: 2024-11-14

## 2024-11-14 RX ORDER — TRAMADOL HYDROCHLORIDE 50 MG/1
TABLET ORAL EVERY 6 HOURS PRN
Qty: 30 TABLET | Refills: 0 | Status: SHIPPED | OUTPATIENT
Start: 2024-11-14

## 2024-11-14 RX ORDER — ACETAMINOPHEN 500 MG
1000 TABLET ORAL EVERY 6 HOURS PRN
Qty: 60 TABLET | Refills: 0 | Status: SHIPPED | OUTPATIENT
Start: 2024-11-14 | End: 2024-11-14

## 2024-11-14 RX ORDER — HYDROMORPHONE HYDROCHLORIDE 1 MG/ML
0.2 INJECTION, SOLUTION INTRAMUSCULAR; INTRAVENOUS; SUBCUTANEOUS EVERY 5 MIN PRN
Status: DISCONTINUED | OUTPATIENT
Start: 2024-11-14 | End: 2024-11-14

## 2024-11-14 RX ORDER — ONDANSETRON 2 MG/ML
4 INJECTION INTRAMUSCULAR; INTRAVENOUS EVERY 6 HOURS PRN
Status: DISCONTINUED | OUTPATIENT
Start: 2024-11-14 | End: 2024-11-14

## 2024-11-14 RX ORDER — MORPHINE SULFATE 10 MG/ML
6 INJECTION, SOLUTION INTRAMUSCULAR; INTRAVENOUS EVERY 10 MIN PRN
Status: DISCONTINUED | OUTPATIENT
Start: 2024-11-14 | End: 2024-11-14

## 2024-11-14 RX ADMIN — ROPIVACAINE HYDROCHLORIDE 20 ML: 5 INJECTION, SOLUTION EPIDURAL; INFILTRATION; PERINEURAL at 11:15:00

## 2024-11-14 RX ADMIN — DEXAMETHASONE SODIUM PHOSPHATE 8 MG: 4 MG/ML VIAL (ML) INJECTION at 12:35:00

## 2024-11-14 RX ADMIN — ROPIVACAINE HYDROCHLORIDE 20 ML: 5 INJECTION, SOLUTION EPIDURAL; INFILTRATION; PERINEURAL at 11:06:00

## 2024-11-14 RX ADMIN — LIDOCAINE HYDROCHLORIDE 40 MG: 10 INJECTION, SOLUTION EPIDURAL; INFILTRATION; INTRACAUDAL; PERINEURAL at 12:28:00

## 2024-11-14 RX ADMIN — DEXAMETHASONE SODIUM PHOSPHATE 10 MG: 10 INJECTION, SOLUTION INTRAMUSCULAR; INTRAVENOUS at 11:15:00

## 2024-11-14 RX ADMIN — MIDAZOLAM HYDROCHLORIDE 2 MG: 1 INJECTION INTRAMUSCULAR; INTRAVENOUS at 11:15:00

## 2024-11-14 RX ADMIN — ROCURONIUM BROMIDE 30 MG: 10 INJECTION, SOLUTION INTRAVENOUS at 13:48:00

## 2024-11-14 RX ADMIN — GLYCOPYRROLATE 0.2 MG: 0.2 INJECTION, SOLUTION INTRAMUSCULAR; INTRAVENOUS at 12:25:00

## 2024-11-14 RX ADMIN — DEXAMETHASONE SODIUM PHOSPHATE 10 MG: 10 INJECTION, SOLUTION INTRAMUSCULAR; INTRAVENOUS at 11:06:00

## 2024-11-14 RX ADMIN — ROCURONIUM BROMIDE 50 MG: 10 INJECTION, SOLUTION INTRAVENOUS at 12:29:00

## 2024-11-14 RX ADMIN — ONDANSETRON 4 MG: 2 INJECTION INTRAMUSCULAR; INTRAVENOUS at 12:35:00

## 2024-11-14 RX ADMIN — LIDOCAINE HYDROCHLORIDE 5 ML: 10 INJECTION, SOLUTION INFILTRATION; PERINEURAL at 11:06:00

## 2024-11-14 NOTE — ANESTHESIA PROCEDURE NOTES
Airway  Date/Time: 11/14/2024 12:30 PM  Urgency: Elective    Airway not difficult    General Information and Staff    Patient location during procedure: OR  Resident/CRNA: Theodore Moya CRNA  Performed: CRNA   Performed by: Theodore Moya CRNA  Authorized by: Markus Ferreira MD      Indications and Patient Condition  Indications for airway management: anesthesia  Spontaneous ventilation: present  Sedation level: deep  Preoxygenated: yes  Patient position: sniffing  Mask difficulty assessment: 1 - vent by mask  Planned trial extubation    Final Airway Details  Final airway type: endotracheal airway      Successful airway: ETT  Cuffed: yes   Successful intubation technique: Video laryngoscopy  Facilitating devices/methods: intubating stylet  Endotracheal tube insertion site: oral  Blade type: Meeks.  Blade size: #3  ETT size (mm): 7.5    Placement verified by: capnometry   Cuff volume (mL): 7  Measured from: teeth  Number of attempts at approach: 1

## 2024-11-14 NOTE — ANESTHESIA PROCEDURE NOTES
Peripheral Block    Date/Time: 11/14/2024 11:15 AM    Performed by: Markus Ferreira MD  Authorized by: Markus Ferreira MD      General Information and Staff    Start Time:  11/14/2024 11:15 AM  End Time:  11/14/2024 11:29 AM  Anesthesiologist:  Markus Ferreira MD  Performed by:  Anesthesiologist  Patient Location:  PACU    Block Placement: Pre Induction  Site Identification: real time ultrasound guided, surface landmarks and image stored and retrievable    Block site/laterality marked before start: site marked  Reason for Block: at surgeon's request, post-op pain management and surgical anesthesia    Preanesthetic Checklist: 2 patient identifers, IV checked, risks and benefits discussed, monitors and equipment checked, pre-op evaluation, timeout performed, anesthesia consent, sterile technique used, no prohibitive neurological deficits and no local skin infection at insertion site      Procedure Details    Patient Position:  Supine  Prep: ChloraPrep and patient draped    Monitoring:  Heart rate, cardiac monitor, continuous pulse ox and blood pressure cuff  Block Type:  Popliteal  Laterality:  Left  Injection Technique:  Single-shot    Needle    Needle Type:  Echogenic  Needle Gauge:  22 G  Needle Length:  90 mm  Needle Localization:  Anatomical landmarks and ultrasound guidance  Reason for Ultrasound Use: appropriate spread of the medication was noted in real time and no ultrasound evidence of intravascular and/or intraneural injection    Nerve Stimulator: 0.4 amps  Muscle Twitch Response: plantarflexion    Needle Insertion Depth:  6    Assessment    Injection Assessment:  Good spread noted, incremental injection, local visualized surrounding nerve on ultrasound, low pressure, negative aspiration for heme, negative resistance and no pain on injection  Heart Rate Change: No        Medications  11/14/2024 11:15 AM  midazolam (VERSED)injection 2mg/2ml - Intravenous   2 mg - 11/14/2024 11:15:00  AM  ropivacaine (NAROPIN) injection 0.5% - Infiltration   20 mL - 11/14/2024 11:15:00 AM  dexamethasone (DECADRON) PF injection 10 mg/ml - Injection   10 mg - 11/14/2024 11:15:00 AM    Additional Comments

## 2024-11-14 NOTE — ANESTHESIA POSTPROCEDURE EVALUATION
Patient: Jayleen Gillette    Procedure Summary       Date: 11/14/24 Room / Location: [facility] MAIN OR 11 / [facility] MAIN OR    Anesthesia Start: 1218 Anesthesia Stop: 1446    Procedures:       Left foot posterior tibial tendon tenolysis, medial displacement calcaneal osteotomy, peroneal brevis to longus transfer, secondary achilles tendon repair, spring ligament secondary reconstruction, secondary brostrom reconstruction, tibial neurolysis (Left: Foot)      Foot joint fusion (Left: Foot) Diagnosis:       Achilles tendinitis of left lower extremity      Osteochondral defect of talus      Foot sprain, left, initial encounter      Sprain of anterior talofibular ligament of left ankle, initial encounter      (Achilles tendinitis of left lower extremity [M76.62]Osteochondral defect of talus [M95.8]Foot sprain, left, initial encounter [S93.602A]Sprain of anterior talofibular ligament of left ankle, initial encounter [S93.492A])    Surgeons: Beth Arrington DO Anesthesiologist: Markus Ferreira MD    Anesthesia Type: general, regional ASA Status: 3            Anesthesia Type: general, regional    Vitals Value Taken Time   /92 11/14/24 1444   Temp 97.6 11/14/24 1446   Pulse 81 11/14/24 1446   Resp 17 11/14/24 1446   SpO2 99 % 11/14/24 1446   Vitals shown include unfiled device data.    [facility] AN Post Evaluation:   Patient Evaluated in PACU  Patient Participation: complete - patient participated  Level of Consciousness: awake, awake and alert and responsive to verbal stimuli  Pain Score: 0  Pain Management: adequate  Airway Patency:patent  Yes    Nausea/Vomiting: none  Cardiovascular Status: acceptable, stable and hemodynamically stable  Respiratory Status: spontaneous ventilation, nonlabored ventilation and room air  Postoperative Hydration acceptable      Theodore Moya CRNA  11/14/2024 2:46 PM

## 2024-11-14 NOTE — ANESTHESIA PROCEDURE NOTES
Peripheral Block    Date/Time: 11/14/2024 11:06 AM    Performed by: Markus Ferreira MD  Authorized by: Markus Ferreira MD      General Information and Staff    Start Time:  11/14/2024 11:06 AM  End Time:  11/14/2024 11:13 AM  Anesthesiologist:  Markus Ferreira MD  Performed by:  Anesthesiologist  Patient Location:  PACU      Site Identification: real time ultrasound guided, surface landmarks and image stored and retrievable    Block site/laterality marked before start: site marked  Reason for Block: at surgeon's request, post-op pain management and surgical anesthesia    Preanesthetic Checklist: 2 patient identifers, IV checked, risks and benefits discussed, monitors and equipment checked, pre-op evaluation, timeout performed, anesthesia consent, sterile technique used, no prohibitive neurological deficits and no local skin infection at insertion site      Procedure Details    Patient Position:  Supine  Prep: ChloraPrep and patient draped    Monitoring:  Heart rate, cardiac monitor, continuous pulse ox and blood pressure cuff  Block Type:  Saphenous  Laterality:  Left  Injection Technique:  Single-shot    Needle    Needle Type:  Echogenic  Needle Gauge:  22 G  Needle Length:  90 mm  Needle Localization:  Anatomical landmarks and ultrasound guidance  Reason for Ultrasound Use: appropriate spread of the medication was noted in real time and no ultrasound evidence of intravascular and/or intraneural injection            Assessment    Injection Assessment:  Good spread noted, incremental injection, local visualized surrounding nerve on ultrasound, low pressure, negative aspiration for heme, negative resistance and no pain on injection  Paresthesia Pain:  None  Heart Rate Change: No        Medications  11/14/2024 11:06 AM  lidocaine injection 1% - Intradermal   5 mL - 11/14/2024 11:06:00 AM  ropivacaine (NAROPIN) injection 0.5% - Infiltration   20 mL - 11/14/2024 11:06:00 AM  dexamethasone (DECADRON) PF  injection 10 mg/ml - Injection   10 mg - 11/14/2024 11:06:00 AM    Additional Comments

## 2024-11-14 NOTE — INTERVAL H&P NOTE
Pre-op Diagnosis: Achilles tendinitis of left lower extremity [M76.62]  Osteochondral defect of talus [M95.8]  Foot sprain, left, initial encounter [S93.794L]  Sprain of anterior talofibular ligament of left ankle, initial encounter [S93.145A]    The above referenced H&P was reviewed by Beth Arrington DO on 11/14/2024, the patient was examined and no significant changes have occurred in the patient's condition since the H&P was performed.  I discussed with the patient and/or legal representative the potential benefits, risks and side effects of this procedure; the likelihood of the patient achieving goals; and potential problems that might occur during recuperation.  I discussed reasonable alternatives to the procedure, including risks, benefits and side effects related to the alternatives and risks related to not receiving this procedure.  We will proceed with procedure as planned.

## 2024-11-14 NOTE — ANESTHESIA PREPROCEDURE EVALUATION
Anesthesia PreOp Note    HPI:     Jayleen Gillette is a 69 year old female who presents for preoperative consultation requested by: Beth Arrington DO    Date of Surgery: 11/14/2024    Procedure(s):  Left foot posterior tibial tendon tenolysis, medial displacement calcaneal osteotomy, peroneal brevis to longus transfer, secondary achilles tendon repair, spring ligament secondary reconstruction  Foot joint fusion  Indication: Achilles tendinitis of left lower extremity [M76.62]  Osteochondral defect of talus [M95.8]  Foot sprain, left, initial encounter [S93.602A]  Sprain of anterior talofibular ligament of left ankle, initial encounter [S93.492A]    Relevant Problems   No relevant active problems       NPO:  Last Liquid Consumption Date: 11/14/24  Last Liquid Consumption Time: 0600 (sips of water with meds)  Last Solid Consumption Date: 11/13/24  Last Solid Consumption Time: 1900  Last Liquid Consumption Date: 11/14/24          History Review:  Patient Active Problem List    Diagnosis Date Noted    TIA (transient ischemic attack)     Ataxia 09/23/2024    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    Intractable chronic migraine without aura and with status migrainosus 09/23/2024    Osteoarthritis of multiple joints 07/23/2024    Polyarthralgia 07/23/2024    Frequent episodes of bronchitis 10/19/2023    Tremor, essential 02/17/2018    Chronic tension-type headache, not intractable 02/17/2018    Recurrent major depressive disorder, in partial remission (HCC) 02/17/2018    Primary osteoarthritis of right knee 08/15/2016    Chondromalacia, right knee 08/15/2016       Past Medical History:    Bronchitis    Cataract    Colon abnormality    spastic    Deep vein thrombosis (HCC)    Depression    Hearing impaired person, bilateral    Hx of motion sickness    Migraines    Osteoarthritis    Patent foramen ovale (HCC)    PONV (postoperative nausea and vomiting)    TIA (transient ischemic attack)    TIA (transient ischemic  attack)    Tremor    Ulcer    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HCC)    Visual impairment       Past Surgical History:   Procedure Laterality Date    Arthroscopy of joint unlisted Right     knee    Hysterectomy  01/01/1984    Laparoscopy,diagnostic      Wrist fracture surgery Right     growth removed 1995       Prescriptions Prior to Admission[1]  Current Medications and Prescriptions Ordered in Epic[2]    Allergies[3]    Family History   Problem Relation Age of Onset    Breast Cancer Sister 35    Breast Cancer Maternal Aunt 60    Breast Cancer Paternal Aunt 60     Social History     Socioeconomic History    Marital status: Single   Tobacco Use    Smoking status: Never    Smokeless tobacco: Never   Vaping Use    Vaping status: Never Used   Substance and Sexual Activity    Alcohol use: No    Drug use: Never    Sexual activity: Not Currently   Other Topics Concern    Pt has a pacemaker No    Pt has a defibrillator No    Reaction to local anesthetic No    Caffeine Concern Yes     Comment: 4 cups of coffee/day    Exercise Yes     Comment: water exercise       Available pre-op labs reviewed.  Lab Results   Component Value Date    WBC 7.6 10/28/2024    RBC 4.31 10/28/2024    HGB 12.3 10/28/2024    HCT 39.1 10/28/2024    MCV 90.7 10/28/2024    MCH 28.5 10/28/2024    MCHC 31.5 10/28/2024    RDW 12.8 10/28/2024    .0 10/28/2024     Lab Results   Component Value Date     10/28/2024    K 4.5 10/28/2024     10/28/2024    CO2 29.0 10/28/2024    BUN 24 (H) 10/28/2024    CREATSERUM 1.04 (H) 10/28/2024    GLU 99 10/28/2024    CA 10.3 10/28/2024          Vital Signs:  Body mass index is 35.25 kg/m².   height is 1.6 m (5' 3\") and weight is 90.3 kg (199 lb). Her temperature is 98.7 °F (37.1 °C). Her blood pressure is 124/81 and her pulse is 88. Her respiration is 16 and oxygen saturation is 96%.   Vitals:    11/11/24 1420 11/14/24 1007   BP:  124/81   Pulse:  88   Resp:  16   Temp:  98.7 °F (37.1 °C)   SpO2:  96%    Weight: 91.2 kg (201 lb) 90.3 kg (199 lb)   Height: 1.6 m (5' 3\") 1.6 m (5' 3\")        Anesthesia Evaluation     Patient summary reviewed and Nursing notes reviewed    History of anesthetic complications   Airway   Mallampati: II  TM distance: >3 FB  Neck ROM: full  Dental      Pulmonary - negative ROS and normal exam   Cardiovascular - negative ROS and normal exam    Neuro/Psych    (+)  TIA,  depression      Comments: Central tremor    GI/Hepatic/Renal - negative ROS     Endo/Other    Abdominal                  Anesthesia Plan:   ASA:  3  Plan:   General and regional  Post-op Pain Management: IV analgesics, Saphenous block and Popliteal block  Informed Consent Plan and Risks Discussed With:  Patient  Use of Blood Products Discussed With:  Patient  Blood Product Use Consented    Discussed plan with:  Surgeon and CRNA      I have informed Jayleen Gillette and/or legal guardian or family member of the nature of the anesthetic plan, benefits, risks including possible dental damage if relevant, major complications, and any alternative forms of anesthetic management.   All of the patient's questions were answered to the best of my ability. The patient desires the anesthetic management as planned.  Markus Ferreira MD  11/14/2024 10:45 AM  Present on Admission:  **None**           [1]   Facility-Administered Medications Prior to Admission   Medication Dose Route Frequency Provider Last Rate Last Admin    [COMPLETED] triamcinolone acetonide (Kenalog-40) 40 MG/ML injection 40 mg  40 mg Intra-articular Once    40 mg at 10/28/24 1222     Medications Prior to Admission   Medication Sig Dispense Refill Last Dose/Taking    glycopyrrolate 1 MG Oral Tab Take 1 tablet (1 mg total) by mouth 3 (three) times daily.   11/13/2024    azelastine 0.1 % Nasal Solution 1 spray by Nasal route 2 (two) times daily.   11/13/2024 at  8:00 PM    folic acid 800 MCG Oral Tab Take 1 tablet (800 mcg total) by mouth daily.   11/14/2024 at  6:00 AM     aspirin (ASPIRIN 81) 81 MG Oral Tab EC Take 1 tablet (81 mg total) by mouth daily. 90 tablet 3 11/11/2024    propranolol 40 MG Oral Tab Take 1 tablet (40 mg total) by mouth 2 (two) times daily. 180 tablet 3 11/14/2024 at  6:15 AM    ibuprofen 600 MG Oral Tab Take 1 tablet (600 mg total) by mouth every 8 (eight) hours as needed for Pain. 180 tablet 3 11/11/2024    triamcinolone 0.1 % External Cream Apply 1 Application topically 2 (two) times daily. 420 Undefined 3 Past Week    ketoconazole 2 % External Cream Apply 1 Application topically 2 (two) times daily. Apply to affected area 2 times daily. 60 Undefined 3 Past Week    omeprazole 20 MG Oral Capsule Delayed Release Take 1 capsule (20 mg total) by mouth before breakfast. 90 capsule 3 11/14/2024 at  6:00 AM    Multiple Vitamins-Minerals (MULTI-VITAMIN/MINERALS) Oral Tab Take 1 tablet by mouth daily.   11/11/2024    EPINEPHrine 0.3 MG/0.3ML Injection Solution Auto-injector Inject 0.3 mL (1 each total) into the skin one time.   Taking    cyclobenzaprine 10 MG Oral Tab Take 1 tablet (10 mg total) by mouth 3 (three) times daily as needed for Muscle spasms. 90 tablet 0 10/31/2024    Calcium Carbonate Antacid 500 MG Oral Chew Tab Chew 1 tablet (500 mg total) by mouth as needed.   11/10/2024    cetirizine 10 MG Oral Tab Take 1 tablet (10 mg total) by mouth as needed.   11/14/2024 at  6:00 AM    Na Sulfate-K Sulfate-Mg Sulf (SUPREP BOWEL PREP KIT) 17.5-3.13-1.6 GM/177ML Oral Solution Take as discussed in clinic 1 each 0    [2]   Current Facility-Administered Medications Ordered in Epic   Medication Dose Route Frequency Provider Last Rate Last Admin    lactated ringers infusion   Intravenous Continuous Beth Arrington DO 20 mL/hr at 11/14/24 1024 New Bag at 11/14/24 1024    famotidine (Pepcid) tab 20 mg  20 mg Oral Once Beth Arrington DO        Or    famotidine (Pepcid) 20 mg/2mL injection 20 mg  20 mg Intravenous Once Beth Arrington DO        ceFAZolin (Ancef) 2g in 10mL  IV syringe premix  2 g Intravenous Once Beth Arrington,          No current Epic-ordered outpatient medications on file.   [3]   Allergies  Allergen Reactions    Acetaminophen HIVES     Other reaction(s): ACETAMINOPHEN    Adhesive Tape HIVES and ITCHING     Cannot tolerate steri strips, no latex bandaids.     Erythromycin ANGIOEDEMA    Gentamicin HIVES     Other reaction(s): GENTAMICIN    Latex HIVES and RASH    Neomycin HIVES    Penicillins HIVES     No skin blistering or sloughing    Sulfa Antibiotics NAUSEA AND VOMITING    Corn-Related Products RASH    Iodine (Topical) RASH     Other reaction(s): IODINE

## 2024-11-14 NOTE — BRIEF OP NOTE
Pre-Operative Diagnosis: Achilles tendinitis of left lower extremity [M76.62]  Osteochondral defect of talus [M95.8]  Foot sprain, left, initial encounter [S93.602A]  Sprain of anterior talofibular ligament of left ankle, initial encounter [S93.492A]     Post-Operative Diagnosis: Achilles tendinitis of left lower extremity [M76.62]Osteochondral defect of talus [M95.8]Foot sprain, left, initial encounter [S93.602A]Sprain of anterior talofibular ligament of left ankle, initial encounter [S93.492A]      Procedure Performed:   Left foot posterior tibial tendon tenolysis, medial displacement calcaneal osteotomy, peroneal brevis to longus transfer, secondary achilles tendon repair, spring ligament secondary reconstruction, secondary brostrom reconstruction, tibial neurolysis    Surgeons and Role:     * Beth Arrington DO - Primary    Assistant(s):  Surgical Assistant.: Lashaun Sheehan     Surgical Findings: spring ligament tear     Specimen: n/a     Estimated Blood Loss: No data recorded    Dictation Number:  [id number]    Beth Arrington DO  11/14/2024  2:22 PM

## 2024-11-15 NOTE — OPERATIVE REPORT
[facility]    PATIENT'S NAME: MIL SWAN   ATTENDING PHYSICIAN: Beth Arrington DO   OPERATING PHYSICIAN: Beth Arrington DO   PATIENT ACCOUNT#:   [id number]    LOCATION:  [place] [address]  MEDICAL RECORD #:   [id number]       YOB: 1955  ADMISSION DATE:       11/14/2024      OPERATION DATE:  11/14/2024    OPERATIVE REPORT      PREOPERATIVE DIAGNOSIS:    1.   Left chronic Achilles interstitial tear.  2.   Left posterior tibial tenosynovitis.  3.   Hindfoot valgus.  4.   Left peroneal tendon contracture.  5.   Chronic spring ligament rupture.  6.   Chronic ankle anterior talofibular ligament sprain.  POSTOPERATIVE DIAGNOSIS:    1.   Left chronic Achilles interstitial tear.  2.   Left posterior tibial tenosynovitis.  3.   Hindfoot valgus.  4.   Left peroneal tendon contracture.  5.   Chronic spring ligament rupture.  6.   Chronic ankle anterior talofibular ligament sprain.  7.   Tibial nerve adhesions.  PROCEDURE:    1.   Left foot secondary Achilles tendon repair.  2.   Left posterior tibial tendon tenolysis.  3.   Medial displacement calcaneal osteotomy.  4.   Peroneus brevis to peroneal longus deep tendon transfer.  5.   Secondary spring ligament reconstruction.  6.   Secondary Brostrom reconstruction.  7.   Tibial neurolysis.    ASSISTANT:  WILMAN Ortega, who was essential in aiding in reduction and assisting in repair in order to facilitate surgery and preserve the neurovascular bundle.    ANESTHESIA:  General with a regional block.    HEMOSTASIS:  Pneumatic tourniquet inflated to 250 mmHg for approximately 43 minutes.    ESTIMATED BLOOD LOSS:  20 mL.    IMPLANTS:    1.   Medline 6.5 headless screw x2.  2.   [place] Artelon FlexBand implant with subsequent 3.5 anchors.  3.   Ashley G-force tenodesis screw size 4.0 mm.    COMPLICATIONS:  None.    CONDITION:  Stable to PACU.    INDICATIONS:  Patient is a 69-year-old female with a history of a spring ligament  rupture with significant pain as well as Achilles interstitial tearing with significant pain.  She failed conservative management including physical therapy, anti-inflammatories, bracing, activity modification, and shoe wear modifications and wished to proceed with surgery.  All risks, benefits, and alternatives were explained to the patient including, but not limited to, DVT, infection, wound healing problems, bone healing problems, neurovascular damage, risk of recurrence of deformity, risk of chronic pain, and risk for need for further surgery.  The patient understood and wished to proceed with the above procedure.    OPERATIVE TECHNIQUE:  The patient was met in the preoperative holding area and marked with an indelible marker.  She was brought back to the operative suite and placed on the operating table in supine position.  There anesthesia was administered by the department of anesthesia.  Once she was adequately sedated, a well-padded left thigh tourniquet was placed, and she was placed in the lateral decubitus position with all bony prominences well padded.  She was prepped and draped in the usual sterile fashion.  A time-out was performed.  All in the room were in agreement with patient, procedure, site of procedure.  Preoperative antibiotics were administered by the department of anesthesia prior to incision.  The limb was exsanguinated and tourniquet inflated to 250 mmHg and remained inflated for approximately 43 minutes.  The tourniquet was deflated and the remainder of the procedure was performed under careful hemostasis.    SECONDARY ACHILLES TENDON REPAIR:  A posterior incision was made over the Achilles tendon and blunt dissection was carried down.  The peritenon was incised and fluid was removed from the tendon sheath.  The tendon was then incised, and a scalpel was used to remove the chronic scar tissue noted in the central aspect of the tendon.  Once the scar tissue was removed, the tendon was  then repaired with a 3-0 Monocryl suture.  It was found to be gliding well, and the remainder of the fibers intact.  The synovitis was irrigated, and the remainder was removed with a scalpel.    POSTERIOR TIBIAL TENDON TENOLYSIS:  Through a medial incision, dissection was carried down and the posterior tibial tendon was identified.  There were adhesions noted on the distal aspect of the tendon which was incised with a scalpel and released with a tenotomy scissors.  The tendon was then found to be gliding freely.     MEDIAL DISPLACEMENT CALCANEAL OSTEOTOMY:  Through a lateral incision, dissection was carried down over the calcaneus and position was checked under fluoroscopy.  The neurovascular bundle was protected.  Baby Hohmann retractors were placed both proximal and distal along the calcaneus and dissection was carried down to the bone.  An oscillating saw was used to osteotomize the calcaneus, and it was shifted medially.  A K-wire was inserted across the fracture site.  Once it was reduced, a second K-wire was placed.  Incisions were made posteriorly, and then we measured, drilled, and inserted.  The position of the calcaneus was checked under fluoroscopy in multiple views and found to be in good views.  There was compression achieved across our osteotomy sites, and the screws were well seated.      PERONEUS BREVIS TO PERONEAL LONGUS DEEP TENDON TRANSFER:  Through a separate lateral incision, dissection was carried down to our peroneal tendons and the distal aspect of the peroneus brevis was released and tenotomized, and the proximal aspect was transferred into the peroneus longus tendon once it was maximally inverted.  A 0 Ethibond suture was used to secure the tendon and was found to help support the arch.    SPRING LIGAMENT SECONDARY RECONSTRUCTION:  Through a separate medial incision, dissection was carried down and the spring ligament was identified.  A wire was inserted into the navicula and the position  checked under fluoroscopy.  Once it was in good position, Artelon FlexBand implant was placed across our navicula after it was reamed and a tenodesis screw was placed.  Next, guide pins were placed into the medial malleolus and the sustentaculum of the calcaneus and the tunnels were drilled.  Then, our anterior limb was placed into the tibia, and our posterior limb was placed into the sustentaculum.  This was all placed under the flexor tendons and found to have an excellent repair.  A 0 Vicryl suture was then used to help repair the ligament tissue to our implant.     SECONDARY BROSTROM RECONSTRUCTION:  Through a lateral incision, we dissected down and noted a significant tear of the ATFL and CFL ligaments.  The tissue was loosened.  We incised it.  Using 0 Vicryl suture in a pants-over-vest fashion to repair our chronic tear to the distal fibula.  There was an excellent repair present.     TIBIAL NEUROLYSIS:  Through a medial incision, dissection was carried down and adhesions were noted along the tibial nerve.  Tenotomy scissors were used to help release the tension around the nerve, so that it was gliding freely.  A footplate was then used to check the position of the foot and found to be excellent.     The wounds thoroughly irrigated.  Our incisions were closed using 0 Vicryl suture for the fascia layer, 2-0 Vicryl for the subcutaneous tissue and a running 3-0 Monocryl for subcuticular closure.  A sterile dressing was placed consisting of silver alginate sponge, Xeroform, 4 x 4, ABD, Webril, and a bulky Torres sugar-tong splint.  The tourniquet had been deflated after the lateral aspect of the procedure and then hemostasis was achieved throughout the remainder of the procedure.  The patient was awoken from anesthesia, brought to the PACU in stable condition.  She will be nonweightbearing through her left lower extremity for 6 weeks, will receive DVT prophylaxis, and follow up in the office in 1 week.    Dictated  By Beth Arrington DO  d: 11/14/2024 14:33:25  t: 11/14/2024 23:17:52  [facility] [id number]  hospitals/

## 2024-11-16 ENCOUNTER — TELEPHONE (OUTPATIENT)
Dept: ORTHOPEDICS CLINIC | Facility: CLINIC | Age: 69
End: 2024-11-16

## 2024-11-16 NOTE — TELEPHONE ENCOUNTER
Post-op call for Dr. Arrington    Date: 11/16/2024      Time: 1:12 PM   Patient: Jayleen Gillette      number: [id number]   Surgery and surgery date:11/14/2024   Left foot posterior tibial tendon tenolysis, medial displacement calcaneal osteotomy, peroneal brevis to longus transfer, secondary achilles tendon repair, spring ligament secondary reconstruction, secondary brostrom reconstruction, tibial neurolysis Left General   Foot joint fusion Left General            ACHILLES TENDON REPAIR/LENGTHENI     Patient unavailable.  Left message on voicemail to call clinic with questions or concerns.  Number was provided./ SPOKE TO PATIENT  Patient's general feeling since discharge:Doing OK,but I have a lot of swelling where they put the blocks, above the splint and in the thigh. The blocks are just starting to subside. It felt tight and we loosened the ace wrap. She had itching by the ankle. She is taking benadryl. Toes are warm and not swollen  Pain control (0-10):No specific pain but feels tightness  Pain Medication:  dose/strength  Medication Quantity Refills Start End   aspirin (ASPIRIN 81) 81 MG Oral Tab EC         Medication Quantity Refills Start End   traMADol 50 MG Oral Tab         Fever:  no  Chills:  no  SOB:  no  Incision site appearance:  Redness   no  Drainage no  Clean/dry/Intact yes  Calf pain, redness or warmth:  Upper calf swelling   Bowel Regimen: no LBM  11/13/2024  If not, what are you taking stool softener/laxative?/ Patient has a GI doctor who cares for her constipation  Confirmed appointment date for post op:/ 11/27/2024  Other concerns patients may ask: I called Dr.Mc Lee and we discussed patients condition. She wanted her to take benadryl which she is doing. Keep icing and elevating. If symptoms change or become worse go to the ED for evaluation  Bathing and bandages   Patient needs to keep incision clean and dry for the first week./DONE  Enforce rest, ice, compression elevation and use their pain  medication accordingly./DONE  If the patient needs more pain medication, please put in a communication.

## 2024-11-19 ENCOUNTER — TELEPHONE (OUTPATIENT)
Dept: INTERNAL MEDICINE CLINIC | Facility: CLINIC | Age: 69
End: 2024-11-19

## 2024-11-19 ENCOUNTER — TELEPHONE (OUTPATIENT)
Dept: CASE MANAGEMENT | Age: 69
End: 2024-11-19

## 2024-11-19 DIAGNOSIS — Q85.83 VHL (VON HIPPEL-LINDAU SYNDROME) (HCC): Primary | ICD-10-CM

## 2024-11-19 NOTE — TELEPHONE ENCOUNTER
Dr. Coker,     Patient called requesting referral to Dr. Sidhu.    Pended referral please review diagnosis and sign off if you agree.    Thank you.  Karma Levin  [facility]

## 2024-11-19 NOTE — TELEPHONE ENCOUNTER
Patient requesting a referral for specialist Edilson Sidhu Retinologist, who was referred by a  Ophthalmologist. Timothy López.   Patient has the appointment with retinologist this Thursday, 11/21/2024.   Insurance is requiring the referral.

## 2024-11-19 NOTE — TELEPHONE ENCOUNTER
Patient requesting approval for parking placard, but patient does not have the form to be completed.     She had ankle and foot surgery and cannot place weight on the area.     She has a friend who can  the signed form.     Please call the patient [phone].

## 2024-11-25 ENCOUNTER — OFFICE VISIT (OUTPATIENT)
Dept: NEUROLOGY | Facility: CLINIC | Age: 69
End: 2024-11-25
Payer: MEDICARE

## 2024-11-25 ENCOUNTER — HOSPITAL ENCOUNTER (OUTPATIENT)
Age: 69
Discharge: HOME | End: 2024-11-25
Payer: MEDICARE

## 2024-11-25 VITALS
HEART RATE: 74 BPM | RESPIRATION RATE: 20 BRPM | OXYGEN SATURATION: 98 % | TEMPERATURE: 98 F | SYSTOLIC BLOOD PRESSURE: 118 MMHG | DIASTOLIC BLOOD PRESSURE: 66 MMHG

## 2024-11-25 DIAGNOSIS — G31.84 MCI (MILD COGNITIVE IMPAIRMENT): ICD-10-CM

## 2024-11-25 DIAGNOSIS — G25.0 TREMOR, ESSENTIAL: Primary | ICD-10-CM

## 2024-11-25 DIAGNOSIS — M79.605 PAIN OF LEFT LOWER EXTREMITY: Primary | ICD-10-CM

## 2024-11-25 PROBLEM — H43.819 VITREOUS DEGENERATION: Status: ACTIVE | Noted: 2024-11-21

## 2024-11-25 PROBLEM — M89.9 DISORDER OF BONE AND CARTILAGE: Status: ACTIVE | Noted: 2018-03-13

## 2024-11-25 PROBLEM — Q21.12 PFO (PATENT FORAMEN OVALE) (HCC): Status: ACTIVE | Noted: 2022-11-29

## 2024-11-25 PROBLEM — E56.9 VITAMIN DEFICIENCY: Status: ACTIVE | Noted: 2018-05-03

## 2024-11-25 PROBLEM — F32.A CHRONIC DEPRESSION: Status: ACTIVE | Noted: 2018-05-03

## 2024-11-25 PROBLEM — G47.69 NOCTURNAL MYOCLONUS: Status: ACTIVE | Noted: 2017-09-22

## 2024-11-25 PROBLEM — G45.9 TRANSIENT ISCHEMIC ATTACK (TIA): Status: ACTIVE | Noted: 2022-11-29

## 2024-11-25 PROBLEM — R41.3 POOR SHORT TERM MEMORY: Status: ACTIVE | Noted: 2018-03-13

## 2024-11-25 PROBLEM — N18.30 CKD (CHRONIC KIDNEY DISEASE) STAGE 3, GFR 30-59 ML/MIN (HCC): Chronic | Status: ACTIVE | Noted: 2024-11-25

## 2024-11-25 PROBLEM — R25.2 JERKING MOVEMENTS OF EXTREMITIES: Status: ACTIVE | Noted: 2017-09-22

## 2024-11-25 PROBLEM — M94.9 DISORDER OF BONE AND CARTILAGE: Status: ACTIVE | Noted: 2018-03-13

## 2024-11-25 PROCEDURE — 99212 OFFICE O/P EST SF 10 MIN: CPT

## 2024-11-25 PROCEDURE — 1159F MED LIST DOCD IN RCRD: CPT | Performed by: OTHER

## 2024-11-25 PROCEDURE — 99214 OFFICE O/P EST MOD 30 MIN: CPT | Performed by: OTHER

## 2024-11-25 PROCEDURE — 1160F RVW MEDS BY RX/DR IN RCRD: CPT | Performed by: OTHER

## 2024-11-25 RX ORDER — PROPRANOLOL HYDROCHLORIDE 40 MG/1
40 TABLET ORAL 2 TIMES DAILY
Qty: 180 TABLET | Refills: 3 | Status: SHIPPED | OUTPATIENT
Start: 2024-11-25

## 2024-11-25 NOTE — ED PROVIDER NOTES
Patient Seen in: [facility]      History     Chief Complaint   Patient presents with    Wound Recheck     Stated Complaint: Left leg Re-dress    Subjective:   HPI      69-year-old female presenting for evaluation of discomfort to the left lower extremity.  Patient had surgery on the left lower extremity 9 days ago, ligament and tendon repair by podiatry/foot and ankle surgery.  Postoperatively she was placed in a splint and bulky dressing.  She comes to the IC today as she feels this padding and splint has shifted.  There is been no change in her pain.  She does report some decrease in the amount of swelling.  She has been nonweightbearing as advised.    Objective:     Past Medical History:    Bronchitis    Cataract    Colon abnormality    spastic    Convulsions (HCC)    Deep vein thrombosis (HCC)    Depression    Hearing impaired person, bilateral    Hx of motion sickness    Migraines    Osteoarthritis    Patent foramen ovale (HCC)    PONV (postoperative nausea and vomiting)    TIA (transient ischemic attack)    TIA (transient ischemic attack)    Tremor    Ulcer    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HCC)    Visual impairment              Past Surgical History:   Procedure Laterality Date    Arthroscopy of joint unlisted Right     knee    Hysterectomy  01/01/1984    Laparoscopy,diagnostic      Wrist fracture surgery Right     growth removed 1995                Social History     Socioeconomic History    Marital status: Single   Tobacco Use    Smoking status: Never    Smokeless tobacco: Never   Vaping Use    Vaping status: Never Used   Substance and Sexual Activity    Alcohol use: No    Drug use: Never    Sexual activity: Not Currently   Other Topics Concern    Pt has a pacemaker No    Pt has a defibrillator No    Reaction to local anesthetic No    Caffeine Concern Yes     Comment: 4 cups of coffee/day    Exercise Yes     Comment: water exercise              Review of Systems    Positive for stated  complaint: Left leg Re-dress  Other systems are as noted in HPI.  Constitutional and vital signs reviewed.      All other systems reviewed and negative except as noted above.    Physical Exam     ED Triage Vitals [11/25/24 1516]   /66   Pulse 74   Resp 20   Temp 98.1 °F (36.7 °C)   Temp src Oral   SpO2 98 %   O2 Device None (Room air)       Current Vitals:   Vital Signs  BP: 118/66  Pulse: 74  Resp: 20  Temp: 98.1 °F (36.7 °C)  Temp src: Oral    Oxygen Therapy  SpO2: 98 %  O2 Device: None (Room air)        Physical Exam  Vitals and nursing note reviewed.   Constitutional:       General: She is not in acute distress.  HENT:      Head: Normocephalic and atraumatic.      Right Ear: External ear normal.      Left Ear: External ear normal.      Nose: Nose normal.      Mouth/Throat:      Mouth: Mucous membranes are moist.   Eyes:      Extraocular Movements: Extraocular movements intact.      Pupils: Pupils are equal, round, and reactive to light.   Cardiovascular:      Rate and Rhythm: Normal rate.   Pulmonary:      Effort: Pulmonary effort is normal.   Abdominal:      General: Abdomen is flat.   Musculoskeletal:         General: Normal range of motion.      Cervical back: Normal range of motion.      Comments: There is a splint in place to the left lower extremity.  This was taken down completely.  Patient has 4 healing lacerations to either side of the foot and heel which demonstrate no drainage, no induration     There is edema and ecchymosis to the foot and ankle as expected.   Skin:     General: Skin is warm.   Neurological:      General: No focal deficit present.      Mental Status: She is alert and oriented to person, place, and time.   Psychiatric:         Mood and Affect: Mood normal.         Behavior: Behavior normal.             ED Course   Labs Reviewed - No data to display     69-year-old female presenting for evaluation of discomfort to the left lower extremity.  Patient feels the splint which was  placed postoperatively has been sliding.  The splint was removed in its entirety.  The surgical wounds appear with no s/sx of infection and.  Wounds were redressed using petroleum dressing, kerlex, bulky padding.  Splint was put back on and ace wrap overtop.  Pt denies any pain and notes improvement in the sliding feeling.    Has podiatry follow up in 2 days.           MDM              Medical Decision Making      Disposition and Plan     Clinical Impression:  1. Pain of left lower extremity         Disposition:  Discharge  11/25/2024  4:05 pm    Follow-up:  Beth Arrington DO  [address] [place] [id number]  [phone]      go to as scheduled this week          Medications Prescribed:  Discharge Medication List as of 11/25/2024  4:05 PM              Supplementary Documentation:

## 2024-11-25 NOTE — ED INITIAL ASSESSMENT (HPI)
Arrives via wheelchair. States she had ligament and tendon repair to LLE by Dr. Arrington 11/14. Dressing and ace wrap in place to lower leg. States her swelling is starting to decrease and needs a better wrap for her dressing. Scheduled to see ortho in 2 days. Surgical dressing has not been changed.

## 2024-11-25 NOTE — PROGRESS NOTES
[facility] [address], [address]  [facility] [address]  [phone]            Neurology Follow up Visit     Referred By: Dr. De Los Santos ref. provider found    Chief Complaint:   Chief Complaint   Patient presents with    Tremors     LOV 9/23/2024 For Tremor, essential. Patient here today 2 month follow up for test orders/results of Audio testing/MRI of the Brain and Spine/Physical therapy/DORIS/opthalmology  and medication management. Patient was unable to complete DORIS and Physical therapy.        HPI:     Jayleen Gillette is a 69 year old female, who presents for von Hippel-Lindau syndrome, history of tremors, history of possible TIA.  Patient with a history of tremors since age of [age], very strong family history including her father, grandmother, and some cousins with tremors.  But also her father was developing Parkinson disease by the end of the life.  Patient was seen by movement specialist and was still diagnosed with essential tremors but they were getting progressively worse and developing some jerking component to the tremors.  Apparently there was a plan to be evaluated for possible parkinsonian syndromes but that was lost to follow-up.  There was discussion about DBS treatment by the movement specialist out of [facility] but that was lost to follow-up also.  von Hippel Lindau syndrome also was monitored but most recent MRI was in 2021.  Patient came to see me first time in September 2024, significant mount of tremors, very jerking myoclonic component to the tremors.  She has been biting her tongue or cheek sometimes.  She was very unsteady, she was falling down.    Also history of severe headaches, that were described as hot spike traveling through the back of the head into the eye itself, associated with light sensitivity, lasting for days.  She was describing a number 20 days a month, she did not want to change anything about her management of possible migraines  versus tension headaches.  Physical therapy for the balance was ordered.    MRI of the brain and cervical spine was done.  No spinal cord changes, but some disc degeneration.  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  DaTscan also was ordered but was not done.    Patient came back for follow-up in November 2024 she was reporting improvement after increasing the dose of propranolol to 40 mg twice a day.  She still has some jerking and cramping symptoms.  She was taking Flexeril for that.  DaTscan was not possible due to the fact she was requiring iodine flush for that.  And she was allergic to it.  She had to undergo surgery for her left ankle.  She was recovering slowly from anesthesia, she felt that she was still feeling foggy but it was improving overall.    Past Medical History:    Bronchitis    Cataract    Colon abnormality    spastic    Convulsions (HCC)    Deep vein thrombosis (HCC)    Depression    Hearing impaired person, bilateral    Hx of motion sickness    Migraines    Osteoarthritis    Patent foramen ovale (HCC)    PONV (postoperative nausea and vomiting)    TIA (transient ischemic attack)    TIA (transient ischemic attack)    Tremor    Ulcer    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HCC)    Visual impairment       Past Surgical History:   Procedure Laterality Date    Arthroscopy of joint unlisted Right     knee    Hysterectomy  01/01/1984    Laparoscopy,diagnostic      Wrist fracture surgery Right     growth removed 1995       Social history:  History   Smoking Status    Never   Smokeless Tobacco    Never     History   Alcohol Use No     History   Drug Use Unknown       Family History   Problem Relation Age of Onset    Breast Cancer Sister 35    Breast Cancer Maternal Aunt 60    Breast Cancer Paternal Aunt 60         Current Outpatient Medications:     glycopyrrolate 1 MG Oral Tab, Take 1 tablet (1 mg  total) by mouth 3 (three) times daily., Disp: , Rfl:     azelastine 0.1 % Nasal Solution, 1 spray by Nasal route 2 (two) times daily., Disp: , Rfl:     aspirin (ASPIRIN 81) 81 MG Oral Tab EC, Take 1 tablet (81 mg total) by mouth in the morning and 1 tablet (81 mg total) before bedtime., Disp: 90 tablet, Rfl: 3    traMADol 50 MG Oral Tab, Take 1-2 tablets ( mg total) by mouth every 6 (six) hours as needed for Pain., Disp: 30 tablet, Rfl: 0    folic acid 800 MCG Oral Tab, Take 1 tablet (800 mcg total) by mouth daily., Disp: , Rfl:     Na Sulfate-K Sulfate-Mg Sulf (SUPREP BOWEL PREP KIT) 17.5-3.13-1.6 GM/177ML Oral Solution, Take as discussed in clinic, Disp: 1 each, Rfl: 0    propranolol 40 MG Oral Tab, Take 1 tablet (40 mg total) by mouth 2 (two) times daily., Disp: 180 tablet, Rfl: 3    ibuprofen 600 MG Oral Tab, Take 1 tablet (600 mg total) by mouth every 8 (eight) hours as needed for Pain., Disp: 180 tablet, Rfl: 3    triamcinolone 0.1 % External Cream, Apply 1 Application topically 2 (two) times daily., Disp: 420 Undefined, Rfl: 3    ketoconazole 2 % External Cream, Apply 1 Application topically 2 (two) times daily. Apply to affected area 2 times daily., Disp: 60 Undefined, Rfl: 3    omeprazole 20 MG Oral Capsule Delayed Release, Take 1 capsule (20 mg total) by mouth before breakfast., Disp: 90 capsule, Rfl: 3    Multiple Vitamins-Minerals (MULTI-VITAMIN/MINERALS) Oral Tab, Take 1 tablet by mouth daily., Disp: , Rfl:     EPINEPHrine 0.3 MG/0.3ML Injection Solution Auto-injector, Inject 0.3 mL (1 each total) into the skin one time., Disp: , Rfl:     cyclobenzaprine 10 MG Oral Tab, Take 1 tablet (10 mg total) by mouth 3 (three) times daily as needed for Muscle spasms., Disp: 90 tablet, Rfl: 0    Calcium Carbonate Antacid 500 MG Oral Chew Tab, Chew 1 tablet (500 mg total) by mouth as needed., Disp: , Rfl:     cetirizine 10 MG Oral Tab, Take 1 tablet (10 mg total) by mouth as needed., Disp: , Rfl:     Allergies    Allergen Reactions    Acetaminophen HIVES     Other reaction(s): ACETAMINOPHEN    Adhesive Tape HIVES and ITCHING     Cannot tolerate steri strips, no latex bandaids.     Erythromycin ANGIOEDEMA    Gentamicin HIVES     Other reaction(s): GENTAMICIN    Latex HIVES and RASH    Neomycin HIVES    Penicillins HIVES     No skin blistering or sloughing    Sulfa Antibiotics NAUSEA AND VOMITING and UNKNOWN    Penicillin G UNKNOWN    Corn-Related Products RASH    Iodine (Topical) RASH     Other reaction(s): IODINE       ROS:   As in HPI, the rest of the 14 system review was done and was negative      Physical Exam:  There were no vitals filed for this visit.      General: No apparent distress, well nourished, well groomed.  Head- Normocephalic, atraumatic  Eyes- No redness or swelling  ENT- Hearing intake, normal glutition  Neck- No masses or adenopathy  Cv: pulses were palpable and normal, no cyanosis or edema     Neurological:     Mental Status- Alert and oriented x3.  Normal attention span and concentration  Thought process intact  Memory intact- recent and remote  Mood intact  Fund of knowledge appropriate for education and age    Language intact including: comprehension, naming, repetition, vocabulary    Cranial Nerves:    III, IV, VI- EOM intact, EVE  V. Facial sensation intact  VII. Face symmetric, no facial weakness  VIII. Hearing intact to whisper.  IX. Pallet elevates symmetrically.  XI. Shoulder shrug is intact  XII. Tongue is midline    Motor Exam:  Improvement of tremors noted, bilaterally, minimal head tremors, or trunk tremors.    Sensory Exam:  Light touch sensation- intact in all 4 extremities  Wheelchair-bound due to the left ankle surgery    Labs:    No results found for: \"TSH\"  No results found for: \"CHOL\", \"HDL\", \"LDL\", \"TRIG\"  Lab Results   Component Value Date    HGB 12.3 10/28/2024    HCT 39.1 10/28/2024    MCV 90.7 10/28/2024    WBC 7.6 10/28/2024    .0 10/28/2024      Lab Results    Component Value Date    BUN 24 (H) 10/28/2024    CA 10.3 10/28/2024    ALT 26 10/28/2024    AST 25 10/28/2024    ALB 4.5 10/28/2024     10/28/2024    K 4.5 10/28/2024     10/28/2024    CO2 29.0 10/28/2024      I have reviewed labs.      Assessment   1. Tremor, essential  Further improvement with increased dose of propranolol, confirming diagnosis of possible essential tremors.  Will continue to monitor clinically for development of any parkinsonian symptoms in the future.  But that scan is not possible.    2. Ataxia      3. 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HCC)  Patient does continue to follow-up with an ophthalmologist, ENT, hearing aid is required, MRI of the brain showed venous angiomas, stable.  Some microvascular changes noted as well.    4. Intractable chronic migraine without aura and with status migrainosus  Possibility of chronic migraines, patient did not want to do any management changes.  Continue with hydration    5. Stuttering           Education and counseling provided to patient. Instructed patient to call my office or seek medical attention immediately if symptoms worsen.  Patient verbalized understanding of information given. All questions were answered. All side effects of drugs were discussed.       Return to clinic in: No follow-ups on file.    Scott Pollard MD

## 2024-11-27 ENCOUNTER — HOSPITAL ENCOUNTER (OUTPATIENT)
Dept: GENERAL RADIOLOGY | Age: 69
Discharge: HOME | End: 2024-11-27
Attending: ORTHOPAEDIC SURGERY
Payer: MEDICARE

## 2024-11-27 ENCOUNTER — OFFICE VISIT (OUTPATIENT)
Dept: ORTHOPEDICS CLINIC | Facility: CLINIC | Age: 69
End: 2024-11-27

## 2024-11-27 DIAGNOSIS — M76.822 TIBIALIS POSTERIOR TENDINITIS, LEFT: ICD-10-CM

## 2024-11-27 DIAGNOSIS — S93.492D SPRAIN OF ANTERIOR TALOFIBULAR LIGAMENT OF LEFT ANKLE, SUBSEQUENT ENCOUNTER: ICD-10-CM

## 2024-11-27 DIAGNOSIS — M76.62 ACHILLES TENDINITIS OF LEFT LOWER EXTREMITY: Primary | ICD-10-CM

## 2024-11-27 PROCEDURE — 1159F MED LIST DOCD IN RCRD: CPT | Performed by: ORTHOPAEDIC SURGERY

## 2024-11-27 PROCEDURE — 73630 X-RAY EXAM OF FOOT: CPT | Performed by: ORTHOPAEDIC SURGERY

## 2024-11-27 PROCEDURE — 99024 POSTOP FOLLOW-UP VISIT: CPT | Performed by: ORTHOPAEDIC SURGERY

## 2024-11-27 PROCEDURE — 1125F AMNT PAIN NOTED PAIN PRSNT: CPT | Performed by: ORTHOPAEDIC SURGERY

## 2024-11-27 PROCEDURE — 1160F RVW MEDS BY RX/DR IN RCRD: CPT | Performed by: ORTHOPAEDIC SURGERY

## 2024-11-27 NOTE — PROGRESS NOTES
Chief Complaint   Patient presents with    Post-Op     Left ankle sx done on 11/14. Patient rates her pain 5/10 at this time.      HPI  Pt notes her pain has improved overall.     Physical Exam  Gen: NAD, alert, answering questions appropriately  HEENT: NCAT, EOMI  Lungs: NL breathing, symmetrical lung expansion  Abd: Soft, ND  Extremities:   Left Ankle with neutral alignment, DF/PF/EHL intact, SILT, cap refill brisk, incisions CDI, moderate swelling. Ecchymosis to foot/ankle      Imaging seen and reviewed by me:   3v of the left foot s/p calcaneal osteotomy with stable alignment of hardware and tunnel sites. Joint spaces well maintained.    Assessment/Plan: 69 year old year old female presenting Status post left secondary Achilles tendon repair, posterior tibial tenolysis, MDCO, peroneus brevis to longus transfer, spring ligament reconstruction, secondary Broström reconstruction, tibial neurolysis on 11/14/2024  1. Achilles tendinitis of left lower extremity  Patient remain nonweightbearing for 6 weeks postoperatively.  This will be followed by 2 weeks partial weightbearing in a boot followed then by full weightbearing at 8 weeks postoperatively in the boot.    2. Sprain of anterior talofibular ligament of left ankle, subsequent encounter   Patient may begin gentle ABC range of motion exercises.She should continue to ice and elevate her foot.     3. Tibialis posterior tendinitis, left    - XR FOOT, COMPLETE (MIN 3 VIEWS), LEFT (CPT=73630); Future  Patient may begin 50% weight bearing in 4 weeks in the CAM boot.  Patient will follow-up in 6 weeks for repeat x-rays.    Beth Arrington, DO  11/27/24

## 2024-12-10 ENCOUNTER — TELEPHONE (OUTPATIENT)
Dept: ORTHOPEDICS CLINIC | Facility: CLINIC | Age: 69
End: 2024-12-10

## 2024-12-10 ENCOUNTER — TELEPHONE (OUTPATIENT)
Dept: INTERNAL MEDICINE CLINIC | Facility: CLINIC | Age: 69
End: 2024-12-10

## 2024-12-10 DIAGNOSIS — Z47.89 ORTHOPEDIC AFTERCARE: Primary | ICD-10-CM

## 2024-12-10 NOTE — TELEPHONE ENCOUNTER
Patient stopped at the [place] office to speak with Dr. Mccord or her staff. Patient received a phone call from her [facility] office but could not get through to speak to someone. She was unsure of the reason for the call. Would like a call back. Please advise  [phone] (home)

## 2024-12-10 NOTE — TELEPHONE ENCOUNTER
Patient asking Dr Coker's staff to create her a form to take to the DM for a temporary Parking Placard for her foot, post surgery cannot walk.    Please check temporary and for foot.  This will allow patient to take it to the DM[employer] today.    Secondly, Please note: previously the office completed a form for a Permanent Parking placard due to her tremors.  That is good however, her street name was spelled wrong on it.  Can she still use that or does the permament application need to be corrected.     She crossed off \"[id number] and she wrote across the [facility].    Call patient at:  [phone]

## 2024-12-11 ENCOUNTER — OFFICE VISIT (OUTPATIENT)
Dept: ORTHOPEDICS CLINIC | Facility: CLINIC | Age: 69
End: 2024-12-11

## 2024-12-11 ENCOUNTER — HOSPITAL ENCOUNTER (OUTPATIENT)
Dept: GENERAL RADIOLOGY | Age: 69
Discharge: HOME | End: 2024-12-11
Attending: ORTHOPAEDIC SURGERY
Payer: MEDICARE

## 2024-12-11 DIAGNOSIS — M62.838 MUSCLE SPASM: ICD-10-CM

## 2024-12-11 DIAGNOSIS — S93.492D SPRAIN OF ANTERIOR TALOFIBULAR LIGAMENT OF LEFT ANKLE, SUBSEQUENT ENCOUNTER: ICD-10-CM

## 2024-12-11 DIAGNOSIS — M76.822 TIBIALIS POSTERIOR TENDINITIS, LEFT: ICD-10-CM

## 2024-12-11 DIAGNOSIS — Z47.89 ORTHOPEDIC AFTERCARE: Primary | ICD-10-CM

## 2024-12-11 PROCEDURE — 73630 X-RAY EXAM OF FOOT: CPT | Performed by: ORTHOPAEDIC SURGERY

## 2024-12-11 PROCEDURE — 99024 POSTOP FOLLOW-UP VISIT: CPT | Performed by: ORTHOPAEDIC SURGERY

## 2024-12-11 PROCEDURE — 1160F RVW MEDS BY RX/DR IN RCRD: CPT | Performed by: ORTHOPAEDIC SURGERY

## 2024-12-11 PROCEDURE — 1125F AMNT PAIN NOTED PAIN PRSNT: CPT | Performed by: ORTHOPAEDIC SURGERY

## 2024-12-11 PROCEDURE — 1159F MED LIST DOCD IN RCRD: CPT | Performed by: ORTHOPAEDIC SURGERY

## 2024-12-11 RX ORDER — CYCLOBENZAPRINE HCL 10 MG
10 TABLET ORAL 3 TIMES DAILY PRN
Qty: 60 TABLET | Refills: 1 | Status: SHIPPED | OUTPATIENT
Start: 2024-12-11

## 2024-12-11 NOTE — TELEPHONE ENCOUNTER
Spoke with patient. In the interim she figured out that it is MD's other office. Upon further conversation. She had fallen since her last office visit and x-ray. She states that she landed on her side and then hit the lateral part of her ankle on the floor after. She is having edema, it is pitting. I advised she should be reassessed. I scheduled her for this afternoon at 4:30 pm. I did advise she would likely need x-rays.

## 2024-12-11 NOTE — PROGRESS NOTES
Chief Complaint   Patient presents with    Follow - Up     Left ankle f/u. Patient rates her pain 6/10 at this time. X-Rays were done today.      HPI  Pt notes pain on the lateral foot and heel. She recently fell and hit the lateral aspect of the foot. She notes pitting edema.     Physical Exam  Gen: NAD, alert, answering questions appropriately  HEENT: NCAT, EOMI  Lungs: NL breathing, symmetrical lung expansion  Abd: Soft, ND  Extremities:   left Ankle with neutral alignment, DF/PF/EHL intact, SILT, cap refill brisk  left Foot with neutral alignment, painless foot range of motion. Limited foot inversion and eversion. TTP to heel and lateral foot, ecchymosis with various stages of heel.   Incisions CDI, no erythema    Imaging seen and reviewed by me:   3v left foot s/p calc osteotomy with stable alignment of hardware.  No evidence of hardware failure or migration.  Plantigrade foot present.  Soft tissue edema present.    Assessment/Plan: 69 year old year old female presenting with worsening pain to the lateral foot and heel.  Xrays display stable alignment. No evidence of hardware failure of migration.  Patient will increase cyclobenzaprine to try to help with pain control.  She should follow-up in 4 to 6 weeks to assess her progress.        Beth Arrington, DO  12/11/24

## 2024-12-30 NOTE — TELEPHONE ENCOUNTER
Refill passed per [facility] protocol.    Please review pended refill request as unable to refill due to high/very high interaction warning copied here:    High  Allergy/Contraindication: omeprazoleReactions: RASH. No reaction type specified. User documented allergy severity: Low.  Level 2 with CORN-RELATED PRODUCTS.    Requested Prescriptions   Pending Prescriptions Disp Refills    OMEPRAZOLE 20 MG Oral Capsule Delayed Release [Pharmacy Med Name: OMEPRAZOLE DR 20 MG CAPSULE] 90 capsule 3     Sig: TAKE 1 CAPSULE BY MOUTH BEFORE BREAKFAST EVERY DAY       Gastrointestional Medication Protocol Passed - 12/30/2024  4:16 PM        Passed - In person appointment or virtual visit in the past 12 mos or appointment in next 3 mos     Recent Outpatient Visits              2 weeks ago Orthopedic aftercare    [facility], [address], [place] Beth Arrington DO    Office Visit    1 month ago Achilles tendinitis of left lower extremity    [facility], [address], [place] Beth Arrington DO    Office Visit    1 month ago Tremor, essential    [facility] [place], Scott Mcgraw MD    Office Visit    1 month ago Other specified forms of hearing loss, unspecified laterality    [facility], [facility] Jessie, [place] Maylin, CCC-A    Office Visit    2 months ago Primary osteoarthritis of right knee    [facility], [address], [place] Verona Dyer PA-C    Office Visit          Future Appointments         Provider Department Appt Notes    In 1 week Beth Arrington DO [facility], [address], [place] 2nd POV Left achilles tendon repair 11/14    In 1 month STEPHANIE BRENNAN [facility]                       Future Appointments         Provider Department Appt Notes    In 1 week Beth Arrington DO [facility], [address], [place] 2nd POV Left achilles tendon repair 11/14    In 1 month [facility] @ [facility]          Recent Outpatient Visits              2 weeks ago Orthopedic aftercare    [facility], [address], [place] Beth Arrington, DO    Office Visit    1 month ago Achilles tendinitis of left lower extremity    [facility], [address], [place] Beth Arrington,     Office Visit    1 month ago Tremor, essential    [facility], Scott Mcgraw MD    Office Visit    1 month ago Other specified forms of hearing loss, unspecified laterality    [facility], [facility] Maylin Roman MS, CCC-A    Office Visit    2 months ago Primary osteoarthritis of right knee    [facility], [address], [place] Verona Dyer, PA-C    Office Visit

## 2025-01-08 ENCOUNTER — OFFICE VISIT (OUTPATIENT)
Dept: ORTHOPEDICS CLINIC | Facility: CLINIC | Age: 70
End: 2025-01-08

## 2025-01-08 ENCOUNTER — HOSPITAL ENCOUNTER (OUTPATIENT)
Dept: GENERAL RADIOLOGY | Age: 70
Discharge: HOME | End: 2025-01-08
Attending: ORTHOPAEDIC SURGERY
Payer: MEDICARE

## 2025-01-08 DIAGNOSIS — S93.492D SPRAIN OF ANTERIOR TALOFIBULAR LIGAMENT OF LEFT ANKLE, SUBSEQUENT ENCOUNTER: ICD-10-CM

## 2025-01-08 DIAGNOSIS — M62.838 MUSCLE SPASM: ICD-10-CM

## 2025-01-08 DIAGNOSIS — Z47.89 ORTHOPEDIC AFTERCARE: Primary | ICD-10-CM

## 2025-01-08 DIAGNOSIS — M76.62 ACHILLES TENDINITIS OF LEFT LOWER EXTREMITY: ICD-10-CM

## 2025-01-08 PROCEDURE — 1125F AMNT PAIN NOTED PAIN PRSNT: CPT | Performed by: ORTHOPAEDIC SURGERY

## 2025-01-08 PROCEDURE — 1159F MED LIST DOCD IN RCRD: CPT | Performed by: ORTHOPAEDIC SURGERY

## 2025-01-08 PROCEDURE — 1160F RVW MEDS BY RX/DR IN RCRD: CPT | Performed by: ORTHOPAEDIC SURGERY

## 2025-01-08 PROCEDURE — 99024 POSTOP FOLLOW-UP VISIT: CPT | Performed by: ORTHOPAEDIC SURGERY

## 2025-01-08 PROCEDURE — 73630 X-RAY EXAM OF FOOT: CPT | Performed by: ORTHOPAEDIC SURGERY

## 2025-01-08 NOTE — PROGRESS NOTES
Chief Complaint   Patient presents with    Follow - Up     Left foot follow up. Patient rates her pain 4/10 at this time.      HPI  Jayleen has been putting 50% of her weight on her foot. Her pain is improving.     Physical Exam  Gen: NAD, alert, answering questions appropriately  HEENT: NCAT, EOMI  Lungs: NL breathing, symmetrical lung expansion  Abd: Soft, ND  Extremities: Left Ankle with neutral alignment, plantigrade foot. Active foot DF/inversion. Weakness with foot eversion. Incisions well healed. Minimal swelling present. No erythema/fluctuance. DF/PF/EHL intact, SILT, cap refill brisk    Imaging seen and reviewed by me:   L foot 3v status post calcaneal osteotomy with stable alignment of hardware.  No evidence of hardware failure or migration.  Plantigrade foot present.    Assessment/Plan: 69 year old year old female presenting status post secondary Achilles tendon repair, posterior tibial tenolysis, MD HAMPTON, peroneus brevis to longus transfer, secondary spring ligament reconstruction, secondary Broström reconstruction, and tibial neurolysis on 11/14/2024  1. Orthopedic aftercare  Patient may begin weightbearing as tolerated in cam boot for 2 weeks and then transition to a lace up ankle brace and a gym shoe of.  She should begin aqua therapy    2. Muscle spasm  Patient will take cyclobenzaprine 10 mg 3 times daily as needed for muscle spasms.    3. Achilles tendinitis of left lower extremity  She can stretch and begin strengthening her legs.    4. Sprain of anterior talofibular ligament of left ankle, subsequent encounter  Jayleen may begin WBAT in CAM boot x 2 weeks and then may transition to lace up ankle brace in gym shoe.  We discussed aqua therapy to help her repair process.  She will follow-up in 9 weeks for repeat x-rays of her foot.    Beth Arrington, DO  01/08/25

## 2025-01-27 ENCOUNTER — TELEPHONE (OUTPATIENT)
Facility: CLINIC | Age: 70
End: 2025-01-27

## 2025-01-27 NOTE — TELEPHONE ENCOUNTER
Patient has colonoscopy on 2/4 and requesting to speak with nurse regarding her medications. Patient takes ibuprofen for her joint pain and was told she has to stop 4 days prior. Patient is also taking laxative on/off since September. Patient asking if she can use as her preparation. Patient indicates their is no way she can stop taking. Please call at [phone],thanks.

## 2025-01-28 NOTE — TELEPHONE ENCOUNTER
Spoke to patient    She states she has horrible joint pain in which she takes ibuprofen BID for.  She does not think she can hold the ibuprofen for a full 4 days prior to the endoscopy.    Patient is also taking ASA 81mg BID and states her PCP is ok with her holding that instead of the ibuprofen.    I asked patient if she could hold off taking the ibuprofen the day before the procedure and she said yes.    I let her know I will discuss with Dr. Fried and let her know the best/safest way to proceed.    Dr. Patel,  Please advise  Thank you        Patient states she takes Miralax daily for her constipation.  I advised her to continue this and stop the day she taking the bowel prep  She verbalized understanding.

## 2025-01-29 RX ORDER — ONDANSETRON 4 MG/1
4 TABLET, ORALLY DISINTEGRATING ORAL EVERY 6 HOURS PRN
Qty: 5 TABLET | Refills: 0 | Status: SHIPPED | OUTPATIENT
Start: 2025-01-29

## 2025-01-29 NOTE — TELEPHONE ENCOUNTER
Spoke to patient  Relayed the information below    She stated she is concerned she may get nauseous or vomit   Patient asking for a Rx for zofran    Please advise.    Thank you    ----------------------------------------------------------------------------------------------------------    Patient wants to discuss the bowel prep instructions.  I went through the full instructions with the patient.  We discussed:  - the preferred diet 5 days before  - hold all supplements the week of  - which medications she needs to hold/avoid  - the clear liquid diet  - to avoid the day before  - when to start the bowel prep    Patient verbalized understanding and had no further questions    Total time on phone: 25 minutes

## 2025-02-02 ENCOUNTER — APPOINTMENT (OUTPATIENT)
Dept: GENERAL RADIOLOGY | Facility: HOSPITAL | Age: 70
End: 2025-02-02
Payer: MEDICARE

## 2025-02-02 ENCOUNTER — HOSPITAL ENCOUNTER (EMERGENCY)
Facility: HOSPITAL | Age: 70
Discharge: HOME | End: 2025-02-03
Payer: MEDICARE

## 2025-02-02 DIAGNOSIS — J98.01 ACUTE BRONCHOSPASM: Primary | ICD-10-CM

## 2025-02-02 PROCEDURE — 99284 EMERGENCY DEPT VISIT MOD MDM: CPT

## 2025-02-02 PROCEDURE — 71045 X-RAY EXAM CHEST 1 VIEW: CPT

## 2025-02-02 PROCEDURE — 94640 AIRWAY INHALATION TREATMENT: CPT

## 2025-02-02 PROCEDURE — 96360 HYDRATION IV INFUSION INIT: CPT

## 2025-02-02 RX ORDER — PREDNISONE 20 MG/1
60 TABLET ORAL ONCE
Status: COMPLETED | OUTPATIENT
Start: 2025-02-02 | End: 2025-02-02

## 2025-02-02 RX ORDER — PREDNISONE 20 MG/1
40 TABLET ORAL DAILY
Qty: 10 TABLET | Refills: 0 | Status: SHIPPED | OUTPATIENT
Start: 2025-02-02 | End: 2025-02-07

## 2025-02-02 RX ORDER — IPRATROPIUM BROMIDE AND ALBUTEROL SULFATE 2.5; .5 MG/3ML; MG/3ML
3 SOLUTION RESPIRATORY (INHALATION) ONCE
Status: COMPLETED | OUTPATIENT
Start: 2025-02-02 | End: 2025-02-02

## 2025-02-02 RX ORDER — ALBUTEROL SULFATE 5 MG/ML
10 SOLUTION RESPIRATORY (INHALATION) ONCE
Status: COMPLETED | OUTPATIENT
Start: 2025-02-02 | End: 2025-02-02

## 2025-02-02 RX ORDER — ALBUTEROL SULFATE 0.83 MG/ML
2.5 SOLUTION RESPIRATORY (INHALATION) EVERY 4 HOURS PRN
Qty: 30 EACH | Refills: 0 | Status: SHIPPED | OUTPATIENT
Start: 2025-02-02 | End: 2025-03-04

## 2025-02-02 NOTE — ED INITIAL ASSESSMENT (HPI)
S: pt presents to ED with cough since Thursday. Denies fevers.    B: see chart    A: non productive cough    R: edso

## 2025-02-03 ENCOUNTER — TELEPHONE (OUTPATIENT)
Facility: CLINIC | Age: 70
End: 2025-02-03

## 2025-02-03 VITALS
RESPIRATION RATE: 20 BRPM | HEART RATE: 92 BPM | DIASTOLIC BLOOD PRESSURE: 86 MMHG | SYSTOLIC BLOOD PRESSURE: 124 MMHG | OXYGEN SATURATION: 93 % | TEMPERATURE: 99 F

## 2025-02-03 DIAGNOSIS — Z12.11 SPECIAL SCREENING FOR MALIGNANT NEOPLASMS, COLON: Primary | ICD-10-CM

## 2025-02-03 RX ORDER — NYSTATIN 100000 [USP'U]/ML
5 SUSPENSION ORAL 4 TIMES DAILY
Qty: 280 ML | Refills: 0 | Status: SHIPPED | OUTPATIENT
Start: 2025-02-03 | End: 2025-02-17

## 2025-02-03 RX ORDER — DOXYCYCLINE 100 MG/1
100 CAPSULE ORAL 2 TIMES DAILY
Qty: 14 CAPSULE | Refills: 0 | Status: SHIPPED | OUTPATIENT
Start: 2025-02-03 | End: 2025-02-10

## 2025-02-03 NOTE — ED QUICK NOTES
Rounding Completed    Plan of Care reviewed.  Elimination needs assessed.    Bed is locked and in lowest position. Call light within reach.

## 2025-02-03 NOTE — TELEPHONE ENCOUNTER
Managed care-    Patient colonoscopy [address]10 is now scheduled on 4/29/2025 with Dr. Fried at [facility], dx codes: Colon cancer screen Z12.11.    Thanks!

## 2025-02-03 NOTE — TELEPHONE ENCOUNTER
Rescheduled for:  Colonoscopy 63807     Provider Name:  Dr Rodriguez     Date: FROM 2/4/2025 TO 4/29/2025     Location:   [facility]     Sedation:  MAC     Time: FROM 12:30 pm TO 9:50am(Patient made aware EM will call the day before with an arrival time      Prep:  Suprep     Meds/Allergies Reconciled?:  Physician reviewed      Diagnosis with codes:  Colon Cancer Screen Z12.11     Was patient informed to call insurance with codes (Y/N):  Yes, I confirmed [employer] with the patient.      Referral sent?:  N/A     EM or [facility] notified?:  I sent an electronic request to Endo Scheduling and received a confirmation today.      Medication Orders:  This patient verbally confirmed that she is not taking:     Iron, blood thinners, BP meds, and is not diabetic     Not latex allergy, PCN allergy and does not have a pacemaker     Misc Orders:       Further instructions given by staff:   I discussed the prep instructions with the patient which she verbally understood and is aware that I will send the instructions today via [employer].     Advised patient:     You will not be able to drive, operate machinery or make critical decisions the day of your procedure. Please make arrangements for transportation. You must have a  (age 18 or older) to accompany you, stay in the facility for the duration of your procedure and drive you home after the procedure.  You cannot use public transportation (Uber, Lyft, Taxi). The procedure involves sedation, and you will not be allowed to leave unaccompanied. Your procedure will not proceed forward if you're unable to confirm your  planned to escort you home.

## 2025-02-03 NOTE — TELEPHONE ENCOUNTER
Patient called to inform the schedulers that she needs to cancel her procedure with Dr. Fried. The patient states that she went to the ER yesterday due to difficulty breathing and was given medications and the patient doesn't think she can do the procedure. Please call.

## 2025-02-03 NOTE — ED PROVIDER NOTES
Patient Seen in: [facility] Emergency Department      History     Chief Complaint   Patient presents with    Cough     Stated Complaint: cough,runny nose, complaints of thrush    Subjective:   68yo/f reports a painful non productive harsh cough x 3 days. Worse w time. Reports it is \"bronchitis\" that she gets it \"twice per year\" and does not \"believe it is caused by COVID or RSV\" and does not want to be tested for that. No chest pain. No hemoptysis. No leg swelling. No trouble speaking, swallowing. No dizziness. No trouble speaking, swallowing. No sick contacts.               Objective:     No pertinent past medical history.            No pertinent past surgical history.              No pertinent social history.                Physical Exam     ED Triage Vitals [02/02/25 1656]   /81   Pulse 79   Resp 20   Temp 98.6 °F (37 °C)   Temp src Temporal   SpO2 94 %   O2 Device None (Room air)       Current Vitals:   Vital Signs  BP: 127/83  Pulse: 75  Resp: 20  Temp: 98.6 °F (37 °C)  Temp src: Temporal  MAP (mmHg): 97    Oxygen Therapy  SpO2: 98 %  O2 Device: None (Room air)        Physical Exam  Vitals and nursing note reviewed.   Constitutional:       General: She is not in acute distress.     Appearance: She is well-developed.   HENT:      Head: Normocephalic and atraumatic.      Nose: Nose normal.      Mouth/Throat:      Mouth: Mucous membranes are moist.   Eyes:      Conjunctiva/sclera: Conjunctivae normal.      Pupils: Pupils are equal, round, and reactive to light.   Cardiovascular:      Rate and Rhythm: Normal rate and regular rhythm.      Heart sounds: Normal heart sounds.   Pulmonary:      Effort: Pulmonary effort is normal.      Breath sounds: Rhonchi present.   Abdominal:      General: Bowel sounds are normal.      Palpations: Abdomen is soft.   Musculoskeletal:         General: No tenderness or deformity. Normal range of motion.      Cervical back: Normal range of motion and neck supple.   Skin:    ----- Message from Niki Ag on behalf of [employer] sent at 5/11/2017  4:48 PM CDT -----  Regarding: Script needed for refill  Contact: [phone]  This message is being sent by Niki Ag on behalf of Delfino ReyesNorman León is due for a refill for the generic Focalin he takes. Please let me know when the script is available for  at front.    Thanks,    Niki     General: Skin is warm and dry.      Capillary Refill: Capillary refill takes less than 2 seconds.      Findings: No rash.      Comments: Normal color   Neurological:      General: No focal deficit present.      Mental Status: She is alert and oriented to person, place, and time.      GCS: GCS eye subscore is 4. GCS verbal subscore is 5. GCS motor subscore is 6.      Cranial Nerves: No cranial nerve deficit.      Gait: Gait normal.             ED Course   Labs Reviewed - No data to display        FINDINGS:  CARDIAC/VASC: Heart size and pulmonary vascularity normal.  MEDIAST/PRECIOUS:   No visible mass or adenopathy.  LUNGS/PLEURA: No consolidation or pleural effusion.  BONES: No acute fracture or suspicious bone lesion.  OTHER: Negative.                Impression  CONCLUSION:  1. No acute cardiopulmonary finding.           Dictated by (CST): Gurjit Fonseca MD on 2/02/2025 at 8:35 PM      Finalized by (CST): Gurjit Fonseca MD on 2/02/2025 at 8:36 PM             MDM              Medical Decision Making  68yo/f w hx and exam as stated; cough/wheezing    Wheeze in ed  Tolerating po  No chest pain  No dyspnea  Resolved w nebs  Neg cxr    Plan  Dc to home  Close fu  Albuterol home neb  Pred  Azithro        Amount and/or Complexity of Data Reviewed  Labs:  Decision-making details documented in ED Course.  Radiology:  Decision-making details documented in ED Course.    Risk  OTC drugs.  Prescription drug management.    Critical Care  Total time providing critical care: 40 minutes        Disposition and Plan     Clinical Impression:  1. Acute bronchospasm         Disposition:  Discharge  2/2/2025  9:36 pm    Follow-up:  Jill Coker MD  [address] [place] [id number] [phone]    Follow up in 2 day(s)      We recommend that you schedule follow up care with a primary care provider within the next three months to obtain basic health screening including reassessment of your blood pressure.      Medications  Prescribed:  Current Discharge Medication List        START taking these medications    Details   albuterol (2.5 MG/3ML) 0.083% Inhalation Nebu Soln Take 3 mL (2.5 mg total) by nebulization every 4 (four) hours as needed.  Qty: 30 each, Refills: 0      predniSONE 20 MG Oral Tab Take 2 tablets (40 mg total) by mouth daily for 5 days.  Qty: 10 tablet, Refills: 0                 Supplementary Documentation:

## 2025-02-04 ENCOUNTER — TELEPHONE (OUTPATIENT)
Dept: INTERNAL MEDICINE CLINIC | Facility: CLINIC | Age: 70
End: 2025-02-04

## 2025-02-04 ENCOUNTER — OFFICE VISIT (OUTPATIENT)
Dept: INTERNAL MEDICINE CLINIC | Facility: CLINIC | Age: 70
End: 2025-02-04

## 2025-02-04 ENCOUNTER — PATIENT OUTREACH (OUTPATIENT)
Dept: CASE MANAGEMENT | Age: 70
End: 2025-02-04

## 2025-02-04 VITALS
HEIGHT: 64 IN | HEART RATE: 66 BPM | WEIGHT: 185 LBS | DIASTOLIC BLOOD PRESSURE: 68 MMHG | SYSTOLIC BLOOD PRESSURE: 119 MMHG | BODY MASS INDEX: 31.58 KG/M2 | OXYGEN SATURATION: 97 %

## 2025-02-04 DIAGNOSIS — H26.9 CATARACT OF BOTH EYES, UNSPECIFIED CATARACT TYPE: Primary | ICD-10-CM

## 2025-02-04 DIAGNOSIS — J20.9 BRONCHITIS WITH BRONCHOSPASM: Primary | ICD-10-CM

## 2025-02-04 DIAGNOSIS — H25.013 CORTICAL AGE-RELATED CATARACT OF BOTH EYES: ICD-10-CM

## 2025-02-04 DIAGNOSIS — R26.81 UNSTEADY GAIT: ICD-10-CM

## 2025-02-04 DIAGNOSIS — Z59.87 MATERIAL HARDSHIP DUE TO LIMITED FINANCIAL RESOURCES, NOT ELSEWHERE CLASSIFIED: ICD-10-CM

## 2025-02-04 DIAGNOSIS — Z59.10 INADEQUATE HOUSING, UNSPECIFIED: ICD-10-CM

## 2025-02-04 PROCEDURE — 1126F AMNT PAIN NOTED NONE PRSNT: CPT | Performed by: INTERNAL MEDICINE

## 2025-02-04 PROCEDURE — 3008F BODY MASS INDEX DOCD: CPT | Performed by: INTERNAL MEDICINE

## 2025-02-04 PROCEDURE — 1159F MED LIST DOCD IN RCRD: CPT | Performed by: INTERNAL MEDICINE

## 2025-02-04 PROCEDURE — 99214 OFFICE O/P EST MOD 30 MIN: CPT | Performed by: INTERNAL MEDICINE

## 2025-02-04 PROCEDURE — 1160F RVW MEDS BY RX/DR IN RCRD: CPT | Performed by: INTERNAL MEDICINE

## 2025-02-04 PROCEDURE — 3078F DIAST BP <80 MM HG: CPT | Performed by: INTERNAL MEDICINE

## 2025-02-04 PROCEDURE — 3074F SYST BP LT 130 MM HG: CPT | Performed by: INTERNAL MEDICINE

## 2025-02-04 RX ORDER — BENZONATATE 200 MG/1
200 CAPSULE ORAL 3 TIMES DAILY PRN
Qty: 60 CAPSULE | Refills: 0 | Status: SHIPPED | OUTPATIENT
Start: 2025-02-04

## 2025-02-04 RX ORDER — ALBUTEROL SULFATE 90 UG/1
2 INHALANT RESPIRATORY (INHALATION) EVERY 4 HOURS PRN
Qty: 1 EACH | Refills: 1 | Status: SHIPPED | OUTPATIENT
Start: 2025-02-04

## 2025-02-04 SDOH — ECONOMIC STABILITY - HOUSING INSECURITY: INADEQUATE HOUSING UNSPECIFIED: Z59.10

## 2025-02-04 SDOH — ECONOMIC STABILITY - INCOME SECURITY: MATERIAL HARDSHIP DUE TO LIMITED FINANCIAL RESOURCES, NOT ELSEWHERE CLASSIFIED: Z59.87

## 2025-02-04 NOTE — TELEPHONE ENCOUNTER
Patient called (identified name and ),   Saw Dr Coker today.  Calling back to say she saw Dr Diana López in the past and would like to return to see him.  Referral pended.  She reports history of cataracts.  Dr Coker, please verify if that is the diagnosis you wish to use.

## 2025-02-04 NOTE — PROGRESS NOTES
Transitions of Care Navigation  Discharge Date: 2/3/25  Contact Date: 2025    Transitions of Care Assessment:  CAROL Initial Assessment    General:  Assessment completed with: Patient  Patient Subjective: NCM spoke with patient states is feeling the same. She reports slight shortness of breath, wheezing and crackles in her lungs. Patient has an appointment with her PCP today at 10am. Patient denies any fevers, chills, nausea, vomiting, or any other symptoms at this time. Patient is trying to keep well hydrated. Patient states she was supposed to receive a nebulizer machine in the ED, but never got one handed to her. Pt will further discuss with her PCP.  Chief Complaint: Acute bronchospasm  Verify patient name and  with patient/ caregiver: Yes    Hospital Stay/Discharge:  Tell me what you understand of why you were in the hospital or emergency department: Pt reports to ED with painful non productive harsh cough x3 days.  Prior to leaving the hospital were your Discharge Instructions reviewed with you?: Yes  Did you receive a copy of your written Discharge Instructions?: Yes  What questions do you have about your Discharge Instructions?: None  Do you feel better or worse since you left the hospital or emergency department?: Same    Follow - Up Appointment:  Do you have a follow-up appointment?: Yes  Date: 25  Physician: PCP  Are there any barriers to getting to your follow-up appointment?: No    Home Health/DME:  Prior to leaving the hospital was Home Health (HH) arranged for you?: N/A  Are HH needs identified by staff during the assessment?: No     Prior to leaving the hospital or emergency department was Durable Medical Equipment (DME), medical supplies, or infusions arranged for you?: N/A  Are DME/medical supply/infusions needs identified by staff during this assessment?: No     Medications/Diet:  Did any of your medications change, during or after your hospital stay or ED visit?: Yes  Do you have your  new or updated medications?: Yes  Do you understand what your medications are for and possible side effects?: Yes  Are there any reasons that keep you from taking your medication as prescribed?: No  Any concerns about medication refills?: No    Were you given a different diet per your Discharge Instructions?: No  Reason: n/a     Questions/Concerns:  Do you have any questions or concerns that have not been discussed?: No   CAROL Follow-up Assessment    General:  Assessment completed with: Patient  Patient Subjective: NCM spoke with patient states is feeling the same. She reports slight shortness of breath, wheezing and crackles in her lungs. Patient has an appointment with her PCP today at 10am. Patient denies any fevers, chills, nausea, vomiting, or any other symptoms at this time. Patient is trying to keep well hydrated. Patient states she was supposed to receive a nebulizer machine in the ED, but never got one handed to her. Pt will further discuss with her PCP.  Chief Complaint: Acute bronchospasm    Nursing Interventions:  All discharge instructions reviewed with the patient. Reviewed when to call MD vs when to call 911 or go the ED. Educated patient on the importance of taking all meds as prescribed as well as close f/u with PCP/specialists. Pt verbalized understanding and will contact the office with any further questions or concerns. Patient denies fevers, chills, nausea, vomiting,  chest pain, or any other symptoms at this time. NC provided contact information for any further questions/concerns. Patient verbalized understanding and agreeable.         Medications:  Medication Reconciliation:  I am aware of an inpatient discharge within the last 30 days.  The discharge medication list has been reconciled with the patient's current medication list and reviewed by me. See medication list for additions of new medication, and changes to current doses of medications and discontinued medications.  Current Outpatient  Medications   Medication Sig Dispense Refill    doxycycline 100 MG Oral Cap Take 1 capsule (100 mg total) by mouth 2 (two) times daily for 7 days. 14 capsule 0    nystatin 678075 UNIT/ML Mouth/Throat Suspension Take 5 mL (500,000 Units total) by mouth 4 (four) times daily for 14 days. 280 mL 0    albuterol (2.5 MG/3ML) 0.083% Inhalation Nebu Soln Take 3 mL (2.5 mg total) by nebulization every 4 (four) hours as needed. 30 each 0    predniSONE 20 MG Oral Tab Take 2 tablets (40 mg total) by mouth daily for 5 days. 10 tablet 0    ondansetron 4 MG Oral Tablet Dispersible Take 1 tablet (4 mg total) by mouth every 6 (six) hours as needed for Nausea. 5 tablet 0    OMEPRAZOLE 20 MG Oral Capsule Delayed Release TAKE 1 CAPSULE BY MOUTH BEFORE BREAKFAST EVERY DAY 90 capsule 3    cyclobenzaprine 10 MG Oral Tab Take 1 tablet (10 mg total) by mouth 3 (three) times daily as needed for Muscle spasms. 60 tablet 1    propranolol 40 MG Oral Tab Take 1 tablet (40 mg total) by mouth 2 (two) times daily. (Patient taking differently: Take 1 tablet (40 mg total) by mouth 2 (two) times daily.) 180 tablet 3    aspirin (ASPIRIN 81) 81 MG Oral Tab EC Take 1 tablet (81 mg total) by mouth in the morning and 1 tablet (81 mg total) before bedtime. 90 tablet 3    folic acid 800 MCG Oral Tab Take 1 tablet (800 mcg total) by mouth daily.      Na Sulfate-K Sulfate-Mg Sulf (SUPREP BOWEL PREP KIT) 17.5-3.13-1.6 GM/177ML Oral Solution Take as discussed in clinic 1 each 0    ibuprofen 600 MG Oral Tab Take 1 tablet (600 mg total) by mouth every 8 (eight) hours as needed for Pain. (Patient taking differently: Take 1 tablet (600 mg total) by mouth daily.) 180 tablet 3    EPINEPHrine 0.3 MG/0.3ML Injection Solution Auto-injector Inject 0.3 mL (1 each total) into the skin one time.      Calcium Carbonate Antacid 500 MG Oral Chew Tab Chew 1 tablet (500 mg total) by mouth as needed.      cetirizine 10 MG Oral Tab Take 1 tablet (10 mg total) by mouth as needed.            Follow-up Appointments:  Your appointments       Date & Time Appointment Department ([place])    Feb 04, 2025 10:00 AM [facility] Follow Up with Jill Coker MD [facility], [place] ([facility] [place])        Apr 29, 2025 9:50 AM CDT Procedure Mac with STEPHANIE BRENNAN [facility] (--)              [facility], [address], [place] [facility] [place]  [address]  [place] [id number] [phone] [facility]  [address]  [facility] [address] [phone]            [facility]  Was TCC Ordered: No      Primary Care Provider (If no TCC appointment)  Does patient already have a PCP appointment scheduled? Yes  Nurse Care Manager Confirmed PCP office ER Follow-up appointment with patient       Specialist  Does the patient have any other follow-up appointment(s) need to be scheduled? No         Book By Date: 2/10/25

## 2025-02-05 NOTE — PROGRESS NOTES
Subjective:     Patient ID: Jayleen Gillette is a 69 year old female presents for follow-up after recent ER visit    HPI  Patient reports that she was seen in the emergency room at [facility] 2 days ago because of the difficulty breathing, she developed cough and cold symptoms that triggered deep cough associated with difficulty breathing, she states it felt like a regular bronchitis she gets at least once a year.  Chest x-ray in the emergency room was negative for pneumonia, she was sent home on prednisone, doxycycline and nebulizer solution, but she does not have a nebulizer, she was able to start treatment last night took prednisone 40 mg.  Continues to cough a lot did not sleep for several days, does take levetiracetam BM and it seems somewhat helpful with the cough.  Patient reports that she has seen ophthalmologist diagnosed with a cataracts, seen retina specialist who seems was not concerned about her cataracts.  She has difficulty seeing, reading, she is prone to falls possibly partially related to poor vision.  She is recovering after left foot surgery which was performed 2 months ago still has pain in the foot, being followed by orthopedic specialist, she reinjured her foot dropped by falling.  History/Other:   Review of Systems  Current Outpatient Medications   Medication Sig Dispense Refill    albuterol (VENTOLIN HFA) 108 (90 Base) MCG/ACT Inhalation Aero Soln Inhale 2 puffs into the lungs every 4 (four) hours as needed for Wheezing. 1 each 1    benzonatate 200 MG Oral Cap Take 1 capsule (200 mg total) by mouth 3 (three) times daily as needed for cough. 60 capsule 0    doxycycline 100 MG Oral Cap Take 1 capsule (100 mg total) by mouth 2 (two) times daily for 7 days. 14 capsule 0    nystatin 501673 UNIT/ML Mouth/Throat Suspension Take 5 mL (500,000 Units total) by mouth 4 (four) times daily for 14 days. 280 mL 0    albuterol (2.5 MG/3ML) 0.083% Inhalation Nebu Soln Take 3 mL (2.5 mg total) by  nebulization every 4 (four) hours as needed. 30 each 0    predniSONE 20 MG Oral Tab Take 2 tablets (40 mg total) by mouth daily for 5 days. 10 tablet 0    ondansetron 4 MG Oral Tablet Dispersible Take 1 tablet (4 mg total) by mouth every 6 (six) hours as needed for Nausea. 5 tablet 0    OMEPRAZOLE 20 MG Oral Capsule Delayed Release TAKE 1 CAPSULE BY MOUTH BEFORE BREAKFAST EVERY DAY 90 capsule 3    cyclobenzaprine 10 MG Oral Tab Take 1 tablet (10 mg total) by mouth 3 (three) times daily as needed for Muscle spasms. 60 tablet 1    propranolol 40 MG Oral Tab Take 1 tablet (40 mg total) by mouth 2 (two) times daily. (Patient taking differently: Take 1 tablet (40 mg total) by mouth 2 (two) times daily.) 180 tablet 3    aspirin (ASPIRIN 81) 81 MG Oral Tab EC Take 1 tablet (81 mg total) by mouth in the morning and 1 tablet (81 mg total) before bedtime. 90 tablet 3    folic acid 800 MCG Oral Tab Take 1 tablet (800 mcg total) by mouth daily.      Na Sulfate-K Sulfate-Mg Sulf (SUPREP BOWEL PREP KIT) 17.5-3.13-1.6 GM/177ML Oral Solution Take as discussed in clinic 1 each 0    ibuprofen 600 MG Oral Tab Take 1 tablet (600 mg total) by mouth every 8 (eight) hours as needed for Pain. (Patient taking differently: Take 1 tablet (600 mg total) by mouth daily.) 180 tablet 3    EPINEPHrine 0.3 MG/0.3ML Injection Solution Auto-injector Inject 0.3 mL (1 each total) into the skin one time.      Calcium Carbonate Antacid 500 MG Oral Chew Tab Chew 1 tablet (500 mg total) by mouth as needed.      cetirizine 10 MG Oral Tab Take 1 tablet (10 mg total) by mouth as needed.       Allergies:Allergies[1]    Past Medical History:    Bronchitis    Cataract    Colon abnormality    spastic    Convulsions (HCC)    Deep vein thrombosis (HCC)    Depression    Essential tremor    takes Propranolol    Hearing impaired person, bilateral    Hx of motion sickness    Migraines    Osteoarthritis    Patent foramen ovale (HCC)    PONV (postoperative nausea and  vomiting)    Seizure disorder (HCC)    had seizure due to medication 15-18 years ago    Stroke (HCC)    TIA 2023    TIA (transient ischemic attack)    TIA (transient ischemic attack)    Tremor    Ulcer    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HCC)    Visual impairment    glasses      Past Surgical History:   Procedure Laterality Date    Arthroscopy of joint unlisted Right     knee    Hysterectomy  01/01/1984    Laparoscopy,diagnostic      Wrist fracture surgery Right     growth removed 1995      Family History   Problem Relation Age of Onset    Breast Cancer Sister 35    Breast Cancer Maternal Aunt 60    Breast Cancer Paternal Aunt 60      Social History:   Social History     Socioeconomic History    Marital status: Single   Tobacco Use    Smoking status: Never    Smokeless tobacco: Never   Vaping Use    Vaping status: Never Used   Substance and Sexual Activity    Alcohol use: No    Drug use: Never    Sexual activity: Not Currently   Other Topics Concern    Pt has a pacemaker No    Pt has a defibrillator No    Reaction to local anesthetic No    Caffeine Concern Yes     Comment: 4 cups of coffee/day    Exercise Yes     Comment: water exercise     Social Drivers of Health     Food Insecurity: No Food Insecurity (2/4/2025)    NCSS - Food Insecurity     Worried About Running Out of Food in the Last Year: No     Ran Out of Food in the Last Year: No   Transportation Needs: No Transportation Needs (2/4/2025)    NCSS - Transportation     Lack of Transportation: No   Housing Stability: At Risk (2/4/2025)    NCSS - Housing/Utilities     Has Housing: Yes     Worried About Losing Housing: Yes     Unable to Get Utilities: Yes        Objective:   Physical Exam  Constitutional:       General: She is not in acute distress.     Appearance: She is ill-appearing.   Cardiovascular:      Rate and Rhythm: Normal rate and regular rhythm.      Heart sounds: No murmur heard.  Pulmonary:      Effort: Pulmonary effort is normal. No respiratory  distress.      Breath sounds: No rhonchi.      Comments: Air movement over both lungs, coughing frequently on deep breathing  Musculoskeletal:         General: Normal range of motion.      Right lower leg: No edema.      Comments: Boot on the left leg,   Neurological:      General: No focal deficit present.      Mental Status: She is alert and oriented to person, place, and time. Mental status is at baseline.      Gait: Gait abnormal.         Assessment & Plan:   1. Bronchitis with bronchospasm advised patient to complete antibiotic, finish prednisone report in 4 days how she feels, encouraged her to start using Ventolin inhaler sent prescription to the pharmacy.  Advised that nebulizer can be purchased, most likely will not be covered by insurance because she does not have COPD.   2. Cortical age-related cataract of both eyes    See ophthalmology for evaluation and possible surgical intervention     3.        Unsteady gait, advised to use walker at all times       Asked patient to call in 4 days with report on her symptoms        Meds This Visit:  Requested Prescriptions     Signed Prescriptions Disp Refills    albuterol (VENTOLIN HFA) 108 (90 Base) MCG/ACT Inhalation Aero Soln 1 each 1     Sig: Inhale 2 puffs into the lungs every 4 (four) hours as needed for Wheezing.    benzonatate 200 MG Oral Cap 60 capsule 0     Sig: Take 1 capsule (200 mg total) by mouth 3 (three) times daily as needed for cough.       Imaging & Referrals:  None            [1]   Allergies  Allergen Reactions    Acetaminophen HIVES    Adhesive Tape HIVES and ITCHING     Cannot tolerate steri strips, no latex bandaids.     Erythromycin ANGIOEDEMA    Gentamicin HIVES    Latex HIVES and RASH    Neomycin HIVES    Penicillin G HIVES    Penicillins HIVES     No skin blistering or sloughing    Sulfa Antibiotics NAUSEA AND VOMITING and UNKNOWN    Corn-Related Products RASH    Iodine (Topical) RASH     Other reaction(s): IODINE

## 2025-02-07 RX ORDER — CYCLOBENZAPRINE HCL 10 MG
10 TABLET ORAL 3 TIMES DAILY PRN
Qty: 60 TABLET | Refills: 1 | Status: SHIPPED | OUTPATIENT
Start: 2025-02-07

## 2025-02-18 PROBLEM — N18.30 CKD (CHRONIC KIDNEY DISEASE) STAGE 3, GFR 30-59 ML/MIN (HCC): Chronic | Status: RESOLVED | Noted: 2024-11-25 | Resolved: 2025-02-18

## 2025-02-18 PROBLEM — G45.9 TRANSIENT ISCHEMIC ATTACK (TIA): Status: RESOLVED | Noted: 2022-11-29 | Resolved: 2025-02-18

## 2025-02-19 ENCOUNTER — TELEPHONE (OUTPATIENT)
Dept: INTERNAL MEDICINE CLINIC | Facility: CLINIC | Age: 70
End: 2025-02-19

## 2025-02-19 NOTE — TELEPHONE ENCOUNTER
Spoke to patient to schedule medicare annual wellness visit for 2025. Patient declines to make appointment at this time because she still has colonoscopy to complete at the end of April.

## 2025-03-03 ENCOUNTER — TELEPHONE (OUTPATIENT)
Dept: CASE MANAGEMENT | Age: 70
End: 2025-03-03

## 2025-03-03 DIAGNOSIS — R25.1 OCCASIONAL TREMORS: ICD-10-CM

## 2025-03-03 DIAGNOSIS — R26.89 BALANCE PROBLEMS: Primary | ICD-10-CM

## 2025-03-03 NOTE — TELEPHONE ENCOUNTER
Dr. Coker,     Patient called requesting referral to Dr. Barker.     Patient also is requesting a mammogram order.     Pended referral please review diagnosis and sign off if you agree.    Thank you.  Karma Levin  [facility]

## 2025-04-01 ENCOUNTER — TELEPHONE (OUTPATIENT)
Dept: CASE MANAGEMENT | Age: 70
End: 2025-04-01

## 2025-04-01 DIAGNOSIS — S93.492D SPRAIN OF ANTERIOR TALOFIBULAR LIGAMENT OF LEFT ANKLE, SUBSEQUENT ENCOUNTER: ICD-10-CM

## 2025-04-01 DIAGNOSIS — M95.8 OSTEOCHONDRAL DEFECT OF TALUS: ICD-10-CM

## 2025-04-01 DIAGNOSIS — M76.62 ACHILLES TENDINITIS OF LEFT LOWER EXTREMITY: Primary | ICD-10-CM

## 2025-04-01 DIAGNOSIS — S93.602A FOOT SPRAIN, LEFT, INITIAL ENCOUNTER: ICD-10-CM

## 2025-04-01 NOTE — TELEPHONE ENCOUNTER
Dr. Coker,     Patient called requesting referral to Dr. Arrington.     Pended referral please review diagnosis and sign off if you agree.    Thank you.  Karma Levin  [facility]

## 2025-04-28 RX ORDER — AZELASTINE 1 MG/ML
1 SPRAY, METERED NASAL AS NEEDED
COMMUNITY

## 2025-04-29 ENCOUNTER — HOSPITAL ENCOUNTER (OUTPATIENT)
Facility: HOSPITAL | Age: 70
Setting detail: HOSPITAL OUTPATIENT SURGERY
Discharge: HOME | End: 2025-04-29
Attending: INTERNAL MEDICINE | Admitting: INTERNAL MEDICINE
Payer: MEDICARE

## 2025-04-29 ENCOUNTER — ANESTHESIA EVENT (OUTPATIENT)
Dept: ENDOSCOPY | Facility: HOSPITAL | Age: 70
End: 2025-04-29
Payer: MEDICARE

## 2025-04-29 ENCOUNTER — ANESTHESIA (OUTPATIENT)
Dept: ENDOSCOPY | Facility: HOSPITAL | Age: 70
End: 2025-04-29
Payer: MEDICARE

## 2025-04-29 VITALS
DIASTOLIC BLOOD PRESSURE: 71 MMHG | OXYGEN SATURATION: 98 % | RESPIRATION RATE: 14 BRPM | TEMPERATURE: 97 F | WEIGHT: 194 LBS | HEIGHT: 63.5 IN | SYSTOLIC BLOOD PRESSURE: 130 MMHG | BODY MASS INDEX: 33.95 KG/M2 | HEART RATE: 72 BPM

## 2025-04-29 DIAGNOSIS — Z12.11 SPECIAL SCREENING FOR MALIGNANT NEOPLASMS, COLON: ICD-10-CM

## 2025-04-29 PROBLEM — K64.8 INTERNAL HEMORRHOID: Status: ACTIVE | Noted: 2025-04-29

## 2025-04-29 PROBLEM — K57.90 DIVERTICULOSIS: Status: ACTIVE | Noted: 2025-04-29

## 2025-04-29 PROBLEM — K63.5 POLYP OF COLON: Status: ACTIVE | Noted: 2025-04-29

## 2025-04-29 PROCEDURE — 45385 COLONOSCOPY W/LESION REMOVAL: CPT | Performed by: INTERNAL MEDICINE

## 2025-04-29 RX ORDER — LIDOCAINE HYDROCHLORIDE 10 MG/ML
INJECTION, SOLUTION EPIDURAL; INFILTRATION; INTRACAUDAL; PERINEURAL AS NEEDED
Status: DISCONTINUED | OUTPATIENT
Start: 2025-04-29 | End: 2025-04-29 | Stop reason: SURG

## 2025-04-29 RX ORDER — SODIUM CHLORIDE, SODIUM LACTATE, POTASSIUM CHLORIDE, CALCIUM CHLORIDE 600; 310; 30; 20 MG/100ML; MG/100ML; MG/100ML; MG/100ML
INJECTION, SOLUTION INTRAVENOUS CONTINUOUS
Status: DISCONTINUED | OUTPATIENT
Start: 2025-04-29 | End: 2025-04-29

## 2025-04-29 RX ORDER — NALOXONE HYDROCHLORIDE 0.4 MG/ML
0.08 INJECTION, SOLUTION INTRAMUSCULAR; INTRAVENOUS; SUBCUTANEOUS ONCE AS NEEDED
Status: DISCONTINUED | OUTPATIENT
Start: 2025-04-29 | End: 2025-04-29

## 2025-04-29 RX ADMIN — SODIUM CHLORIDE, SODIUM LACTATE, POTASSIUM CHLORIDE, CALCIUM CHLORIDE: 600; 310; 30; 20 INJECTION, SOLUTION INTRAVENOUS at 09:58:00

## 2025-04-29 RX ADMIN — LIDOCAINE HYDROCHLORIDE 50 MG: 10 INJECTION, SOLUTION EPIDURAL; INFILTRATION; INTRACAUDAL; PERINEURAL at 09:30:00

## 2025-04-29 RX ADMIN — SODIUM CHLORIDE, SODIUM LACTATE, POTASSIUM CHLORIDE, CALCIUM CHLORIDE: 600; 310; 30; 20 INJECTION, SOLUTION INTRAVENOUS at 09:26:00

## 2025-04-29 NOTE — OPERATIVE REPORT
COLONOSCOPY REPORT    Jayleen Gillette     1955 Age 69 year old   PCP Jill Coker MD Endoscopist Amanda Fried MD       Date of procedure: 25    Procedure: Colonoscopy w/ cold snare polypectomy     Pre-operative diagnosis: CRC screening     Post-operative diagnosis: colon polyps, diverticulosis, hemorrhoids     Medications: MAC    Withdrawal time: 11 minutes    Procedure:  Informed consent was obtained from the patient after the risks of the procedure were discussed, including but not limited to bleeding, perforation, aspiration, infection, or possibility of a missed lesion. After discussions of the risks/benefits and alternatives to this procedure, as well as the planned sedation, the patient was placed in the left lateral decubitus position and begun on continuous blood pressure pulse oximetry and EKG monitoring and this was maintained throughout the procedure. Once an adequate level of sedation was obtained a digital rectal exam was completed. Then the lubricated tip of the [id number] diagnostic video colonoscope was inserted and advanced with some difficulty to the cecum requiring pediatric colonoscope and manual pressure due to sigmoid diverticulosis with resultant sigmoid looping using water immersion and CO2 insufflation technique. The cecum was identified by localizing the trifold, the appendix and the ileocecal valve. Withdrawal was begun with thorough washing and careful examination of the colonic walls and folds. A routine second examination of the cecum/ascending colon was performed. Photodocumentation was obtained. The bowel prep was excellent. Views of the colon were excellent with washing. I then carefully withdrew the instrument from the patient who tolerated the procedure well.     Complications: none.    Findings:   1. Two polyps noted as follows:      A. TWO -- 3 mm polyps in the transverse colon; sessile morphology; cold snare polypectomy performed, polyps  retrieved.    2. Diverticulosis: sigmoid colon causing fixed/angulated colon.    3. Ileocecal valve appeared healthy and normal.    4. The colonic mucosa throughout the colon showed normal vascular pattern, without evidence of angioectasias or inflammation.     5. A retroflexed view of the rectum revealed small internal hemorrhoids.    6. REJI: normal rectal tone, no masses palpated.     Impression:   Two small (< 5 mm) polyps removed.   Sigmoid diverticulosis.   Small internal hemorrhoids.   The colon was otherwise normal with glistening mucosa and intact vascular pattern.    Recommend:  Await pathology. The interval for the next colonoscopy will be determined after reviewing pathology. If new signs or symptoms develop, colonoscopy may need to be repeated sooner.   High fiber diet.  Monitor for blood in the stool. If having more than just tinge of blood, call office or go to the ER.  Avoid NSAIDs (motrin, ibuprofen, aleve, advil, naproxen, midol, naprosyn, excedrin) for 14 days.     >>>If tissue was obtained and you have not received your pathology results either by phone or letter within 2 weeks, please call our office at [phone].    Specimens: colon polyps     Blood loss: <1 ml

## 2025-04-29 NOTE — DISCHARGE INSTRUCTIONS
Home Care Instructions for Colonoscopy with Sedation    Diet:  - Resume your regular diet as tolerated unless otherwise instructed.  - Start with light meals to minimize bloating.  - Do not drink alcohol today.    Medication:  - If you have questions about resuming your normal medications, please contact your Primary Care Physician.    Activities:  - Take it easy today. Do not return to work today.  - Do not drive today.  - Do not operate any machinery today (including kitchen equipment).    Colonoscopy:  - You may notice some rectal \"spotting\" (a little blood on the toilet tissue) for a day or two after the exam. This is normal.  - If you experience any rectal bleeding (not spotting), persistent tenderness or sharp severe abdominal pains, oral temperature over 100 degrees Fahrenheit, light-headedness or dizziness, or any other problems, contact your doctor.    **If unable to reach your doctor, please go to the [facility] Emergency Room**    - Your referring physician will receive a full report of your examination.  - If you do not hear from your doctor's office within two weeks of your biopsy, please call them for your results.    You may be able to see your laboratory results in [employer] between 4 and 7 business days.  In some cases, your physician may not have viewed the results before they are released to [employer].  If you have questions regarding your results contact the physician who ordered the test/exam by phone or via [employer] by choosing \"Ask a Medical Question.\"

## 2025-04-29 NOTE — H&P
History & Physical Examination    Patient Name: Jayleen Gillette  MRN: [id number]  [facility]: [id number]  YOB: 1955    Diagnosis: screening for colon cancer    Prescriptions Prior to Admission[1]  Current Hospital Medications[2]    Allergies: Allergies[3]    Past Medical History[4]  Past Surgical History[5]  Family History[6]  Social History     Tobacco Use    Smoking status: Never    Smokeless tobacco: Never   Substance Use Topics    Alcohol use: No       SYSTEM Check if Review is Normal Check if Physical Exam is Normal If not normal, please explain:   HEENT [X ] [ X]    NECK  [X ] [ X]    HEART [X ] [ X]    LUNGS [X ] [ X]    ABDOMEN [X ] [ X]    EXTREMITIES [X ] [ X]    OTHER        I have discussed the risks and benefits and alternatives of the procedure with the patient/family.  They understand and agree to proceed with plan of care.   I have reviewed the History and Physical done within the last 30 days.  Any changes noted above.    Amanda Fried MD  [facility]                   [1]   Medications Prior to Admission   Medication Sig Dispense Refill Last Dose/Taking    azelastine 0.1 % Nasal Solution 1 spray by Nasal route as needed for Rhinitis.   Taking As Needed    OMEPRAZOLE 20 MG Oral Capsule Delayed Release TAKE 1 CAPSULE BY MOUTH BEFORE BREAKFAST EVERY DAY 90 capsule 3 Taking    propranolol 40 MG Oral Tab Take 1 tablet (40 mg total) by mouth 2 (two) times daily. (Patient taking differently: Take 1 tablet (40 mg total) by mouth 2 (two) times daily.) 180 tablet 3 Taking Differently    aspirin (ASPIRIN 81) 81 MG Oral Tab EC Take 1 tablet (81 mg total) by mouth in the morning and 1 tablet (81 mg total) before bedtime. 90 tablet 3 Taking    folic acid 800 MCG Oral Tab Take 1 tablet (800 mcg total) by mouth daily.   Taking    ibuprofen 600 MG Oral Tab Take 1 tablet (600 mg total) by mouth every 8 (eight) hours as needed for Pain. (Patient taking differently: Take 1 tablet (600  mg total) by mouth daily.) 180 tablet 3 Taking Differently    Calcium Carbonate Antacid 500 MG Oral Chew Tab Chew 1 tablet (500 mg total) by mouth as needed.   Taking As Needed    cetirizine 10 MG Oral Tab Take 1 tablet (10 mg total) by mouth as needed.   Taking As Needed    CYCLOBENZAPRINE 10 MG Oral Tab TAKE 1 TABLET BY MOUTH THREE TIMES A DAY AS NEEDED FOR MUSCLE SPASMS 60 tablet 1 Taking As Needed    albuterol (VENTOLIN HFA) 108 (90 Base) MCG/ACT Inhalation Aero Soln Inhale 2 puffs into the lungs every 4 (four) hours as needed for Wheezing. 1 each 1 Taking As Needed    benzonatate 200 MG Oral Cap Take 1 capsule (200 mg total) by mouth 3 (three) times daily as needed for cough. 60 capsule 0 Taking As Needed    [] doxycycline 100 MG Oral Cap Take 1 capsule (100 mg total) by mouth 2 (two) times daily for 7 days. 14 capsule 0     [] nystatin 497646 UNIT/ML Mouth/Throat Suspension Take 5 mL (500,000 Units total) by mouth 4 (four) times daily for 14 days. 280 mL 0     [] albuterol (2.5 MG/3ML) 0.083% Inhalation Nebu Soln Take 3 mL (2.5 mg total) by nebulization every 4 (four) hours as needed. 30 each 0     [] predniSONE 20 MG Oral Tab Take 2 tablets (40 mg total) by mouth daily for 5 days. 10 tablet 0     ondansetron 4 MG Oral Tablet Dispersible Take 1 tablet (4 mg total) by mouth every 6 (six) hours as needed for Nausea. 5 tablet 0 Taking As Needed    Na Sulfate-K Sulfate-Mg Sulf (SUPREP BOWEL PREP KIT) 17.5-3.13-1.6 GM/177ML Oral Solution Take as discussed in clinic 1 each 0     EPINEPHrine 0.3 MG/0.3ML Injection Solution Auto-injector Inject 0.3 mL (1 each total) into the skin one time.      [2]   No current facility-administered medications for this encounter.   [3]   Allergies  Allergen Reactions    Acetaminophen HIVES    Adhesive Tape HIVES and ITCHING     Cannot tolerate steri strips, no latex bandaids.     Erythromycin ANGIOEDEMA    Gentamicin HIVES    Latex HIVES and RASH     Maltodextrin [place] [Dextrin] ANAPHYLAXIS    Neomycin HIVES    Other ANAPHYLAXIS and OTHER (SEE COMMENTS)     Pine,Maple, Tree Sap    Penicillin G HIVES    Penicillins HIVES     No skin blistering or sloughing    Corn-Related Products RASH     Projectile vomiting    Peanuts NAUSEA AND VOMITING    Sulfa Antibiotics NAUSEA AND VOMITING and UNKNOWN    Iodine (Topical) RASH     Other reaction(s): IODINE   [4]   Past Medical History:   Bronchitis    Cataract    Colon abnormality    spastic    Convulsions ([facility])    Deep vein thrombosis ([facility])    Depression    Essential tremor    takes Propranolol    Fall at home    Hearing impaired person, bilateral    Hx of motion sickness    Migraines    Osteoarthritis    Patent foramen ovale ([facility])    PFO (patent foramen ovale) ([facility])    no symptoms,found incidentally with workup for possible TIA    PONV (postoperative nausea and vomiting)    Seizure disorder ([facility])    had seizure due to medication 15-18 years ago    Stroke ([facility])    TIA 2023    TIA (transient ischemic attack)    TIA (transient ischemic attack)    Transient cerebral ischemia    Transient ischemic attack (TIA)    Tremor    Ulcer    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

## 2025-04-29 NOTE — ANESTHESIA POSTPROCEDURE EVALUATION
Patient: Jayleen Gillette    Procedure Summary       Date: 04/29/25 Room / Location: [facility] ENDOSCOPY 01 / [facility] ENDOSCOPY; [facility]    Anesthesia Start: 0926 Anesthesia Stop:     Procedures:       COLONOSCOPY      PROCEDURE MAC Diagnosis:       Special screening for malignant neoplasms, colon      (diverticulosis, polyps, hemorrhoids)    Scheduled Providers: Amanda Fried MD Anesthesiologist: Ayana Miles CRNA    Anesthesia Type: MAC ASA Status: 2            Anesthesia Type: MAC    Vitals Value Taken Time   /69 04/29/25 10:02   Temp 97 °F (36.1 °C) 04/29/25 10:02   Pulse 69 04/29/25 10:03   Resp 16 04/29/25 10:03   SpO2 94 % 04/29/25 10:03   Vitals shown include unfiled device data.    [facility] AN Post Evaluation:   Patient Evaluated in PACU  Patient Participation: complete - patient participated  Level of Consciousness: sleepy but conscious  Pain Score: 0  Pain Management: adequate  Airway Patency:patent  Dental exam unchanged from preop  Yes    Cardiovascular Status: stable and acceptable  Respiratory Status: acceptable and room air  Postoperative Hydration acceptable      AYANA MILES CRNA  4/29/2025 10:03 AM

## 2025-04-29 NOTE — ANESTHESIA PREPROCEDURE EVALUATION
Anesthesia PreOp Note    HPI:     Jayleen Gilltete is a 69 year old female who presents for preoperative consultation requested by: Amanda Fried MD    Date of Surgery: 4/29/2025    Procedure(s):  COLONOSCOPY  Indication: Special screening for malignant neoplasms, colon    Relevant Problems   No relevant active problems       NPO:  Last Liquid Consumption Date: 04/29/25  Last Liquid Consumption Time: 0630  Last Solid Consumption Date: 04/28/25  Last Solid Consumption Time: 0800  Last Liquid Consumption Date: 04/29/25          History Review:  Patient Active Problem List    Diagnosis Date Noted    Vitreous degeneration 11/21/2024    TIA (transient ischemic attack)     Ataxia 09/23/2024    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[facility]) 09/23/2024    Osteoarthritis of multiple joints 07/23/2024    Polyarthralgia 07/23/2024    Frequent episodes of bronchitis 10/19/2023    PFO (patent foramen ovale) ([facility]) 11/29/2022    Vitamin deficiency 05/03/2018    Disorder of bone and cartilage 03/13/2018    Poor short term memory 03/13/2018    Tremor, essential 02/17/2018    Chronic tension-type headache, not intractable 02/17/2018    Recurrent major depressive disorder, in partial remission 02/17/2018    Jerking movements of extremities 09/22/2017    Nocturnal myoclonus 09/22/2017    Primary osteoarthritis of right knee 08/15/2016    Chondromalacia, right knee 08/15/2016    Orthostasis 09/17/2015    Impairment of balance 05/12/2015    Peripheral vestibulopathy of both ears 05/12/2015    IBS (irritable bowel syndrome) 11/10/2014    Melena 11/10/2014    Occipital neuralgia of left side 10/14/2014    Essential tremor 10/14/2014    Generalized muscle weakness 08/29/2010    Weakness of face muscles 08/19/2010    Dysuria 08/19/2010    Intervertebral lumbar disc disorder with myelopathy, lumbar region 03/18/2009    Cramp of limb 05/19/2008    Convulsions ([facility]) 04/28/2008    Intractable chronic migraine without aura and with status  migrainosus 08/30/2007       Past Medical History[1]    Past Surgical History[2]    Prescriptions Prior to Admission[3]  Current Medications and Prescriptions Ordered in Epic[4]    Allergies[5]    Family History[6]  Social Hx on file[7]    Available pre-op labs reviewed.             Vital Signs:  Body mass index is 33.83 kg/m².   height is 1.613 m (5' 3.5\") and weight is 88 kg (194 lb). Her blood pressure is 122/76 and her pulse is 76. Her respiration is 16 and oxygen saturation is 97%.   Vitals:    01/27/25 1619 04/28/25 1257 04/29/25 0910   BP:   122/76   Pulse:   76   Resp:   16   SpO2:   97%   Weight: 87.1 kg (192 lb) 88 kg (194 lb) 88 kg (194 lb)   Height: 1.626 m (5' 4\") 1.613 m (5' 3.5\") 1.613 m (5' 3.5\")        Anesthesia Evaluation     Patient summary reviewed and Nursing notes reviewed    History of anesthetic complications (nausea)   Airway   Mallampati: I  TM distance: >3 FB  Neck ROM: full  Dental - Dentition appears grossly intact     Pulmonary - normal exam   (+) asthma (related to allergies)  Cardiovascular - negative ROS and normal exam    Neuro/Psych    (+)  TIA,  depression      GI/Hepatic/Renal - negative ROS     Endo/Other - negative ROS   Abdominal  - normal exam                 Anesthesia Plan:   ASA:  2  Plan:   MAC  Informed Consent Plan and Risks Discussed With:  Patient  Discussed plan with:  Surgeon      I have informed Jayleen RODOLFO Gillette and/or legal guardian or family member of the nature of the anesthetic plan, benefits, risks including possible dental damage if relevant, major complications, and any alternative forms of anesthetic management.   All of the patient's questions were answered to the best of my ability. The patient desires the anesthetic management as planned.  CINTHYA MILES CRNA  4/29/2025 9:16 AM  Present on Admission:  **None**           [1]   Past Medical History:   Bronchitis    Cataract    Colon abnormality    spastic    Convulsions (HCC)    Deep vein thrombosis (HCC)     Depression    Essential tremor    takes Propranolol    Fall at home    Hearing impaired person, bilateral    Hx of motion sickness    Migraines    Osteoarthritis    Patent foramen ovale ([facility])    PFO (patent foramen ovale) ([facility])    no symptoms,found incidentally with workup for possible TIA    PONV (postoperative nausea and vomiting)    Seizure disorder ([facility])    had seizure due to medication 15-18 years ago    Stroke ([facility])    TIA 2023    TIA (transient ischemic attack)    TIA (transient ischemic attack)    Transient cerebral ischemia    Transient ischemic attack (TIA)    Tremor    Ulcer    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   Taking As Needed    OMEPRAZOLE 20 MG Oral Capsule Delayed Release TAKE 1 CAPSULE BY MOUTH BEFORE BREAKFAST EVERY DAY 90 capsule 3 4/29/2025 Morning    propranolol 40 MG Oral Tab Take 1 tablet (40 mg total) by mouth 2 (two) times daily. (Patient taking differently: Take 1 tablet (40 mg total) by mouth in the morning and 1 tablet (40 mg total) before bedtime.) 180 tablet 3 4/29/2025 Morning    aspirin (ASPIRIN 81) 81 MG Oral Tab EC Take 1 tablet (81 mg total) by mouth in the morning and 1 tablet (81 mg total) before bedtime. 90 tablet 3 3/13/2025    folic acid 800 MCG Oral Tab Take 1 tablet (800 mcg total) by mouth daily.   Taking    ibuprofen 600 MG Oral Tab Take 1 tablet (600 mg total) by mouth every 8 (eight) hours as needed for Pain. (Patient taking differently: Take 1 tablet (600 mg total) by mouth in the morning.) 180 tablet 3 4/29/2025 Morning    Calcium Carbonate  Antacid 500 MG Oral Chew Tab Chew 1 tablet (500 mg total) by mouth as needed.   Taking As Needed    cetirizine 10 MG Oral Tab Take 1 tablet (10 mg total) by mouth as needed.   2025 Morning    CYCLOBENZAPRINE 10 MG Oral Tab TAKE 1 TABLET BY MOUTH THREE TIMES A DAY AS NEEDED FOR MUSCLE SPASMS 60 tablet 1 2025 Morning    albuterol (VENTOLIN HFA) 108 (90 Base) MCG/ACT Inhalation Aero Soln Inhale 2 puffs into the lungs every 4 (four) hours as needed for Wheezing. 1 each 1 2025    benzonatate 200 MG Oral Cap Take 1 capsule (200 mg total) by mouth 3 (three) times daily as needed for cough. 60 capsule 0 Taking As Needed    [] doxycycline 100 MG Oral Cap Take 1 capsule (100 mg total) by mouth 2 (two) times daily for 7 days. 14 capsule 0     [] nystatin 478027 UNIT/ML Mouth/Throat Suspension Take 5 mL (500,000 Units total) by mouth 4 (four) times daily for 14 days. 280 mL 0     [] albuterol (2.5 MG/3ML) 0.083% Inhalation Nebu Soln Take 3 mL (2.5 mg total) by nebulization every 4 (four) hours as needed. 30 each 0     [] predniSONE 20 MG Oral Tab Take 2 tablets (40 mg total) by mouth daily for 5 days. 10 tablet 0     ondansetron 4 MG Oral Tablet Dispersible Take 1 tablet (4 mg total) by mouth every 6 (six) hours as needed for Nausea. (Patient not taking: Reported on 2025) 5 tablet 0 Not Taking    Na Sulfate-K Sulfate-Mg Sulf (SUPREP BOWEL PREP KIT) 17.5-3.13-1.6 GM/177ML Oral Solution Take as discussed in clinic 1 each 0     EPINEPHrine 0.3 MG/0.3ML Injection Solution Auto-injector Inject 0.3 mL (1 each total) into the skin one time.      [4]   Current Facility-Administered Medications Ordered in Epic   Medication Dose Route Frequency Provider Last Rate Last Admin    lactated ringers infusion   Intravenous Continuous Amanda Fried MD         No current [facility]-ordered outpatient medications on file.   [5]   Allergies  Allergen Reactions    Acetaminophen HIVES     Adhesive Tape HIVES and ITCHING     Cannot tolerate steri strips, no latex bandaids.     Erythromycin ANGIOEDEMA    Gentamicin HIVES    Latex HIVES and RASH    Maltodextrin [place] [Dextrin] ANAPHYLAXIS    Neomycin HIVES    Other ANAPHYLAXIS and OTHER (SEE COMMENTS)     Pine,Maple, Tree Sap    Penicillin G HIVES    Penicillins HIVES     No skin blistering or sloughing    Corn-Related Products RASH     Projectile vomiting    Peanuts NAUSEA AND VOMITING    Sulfa Antibiotics NAUSEA AND VOMITING and UNKNOWN    Iodine (Topical) RASH     Other reaction(s): IODINE   [6]   Family History  Problem Relation Age of Onset    Breast Cancer Sister 35    Breast Cancer Maternal Aunt 60    Breast Cancer Paternal Aunt 60   [7]   Social History  Socioeconomic History    Marital status: Single   Tobacco Use    Smoking status: Never    Smokeless tobacco: Never   Vaping Use    Vaping status: Never Used   Substance and Sexual Activity    Alcohol use: No    Drug use: Never    Sexual activity: Not Currently   Other Topics Concern    Pt has a pacemaker No    Pt has a defibrillator No    Reaction to local anesthetic No    Caffeine Concern Yes     Comment: 4 cups of coffee/day    Exercise Yes     Comment: water exercise

## 2025-05-02 NOTE — PROGRESS NOTES
GI Staff:    Can you please place recall for this patient to have a colonoscopy in 7 years.    Thank you,  Amanda

## 2025-05-06 ENCOUNTER — TELEPHONE (OUTPATIENT)
Facility: CLINIC | Age: 70
End: 2025-05-06

## 2025-05-06 NOTE — TELEPHONE ENCOUNTER
Colonoscopy  recall in 7 years per Dr. Fried. Entered 7 year recall in health maintenance and patient outreach. .

## 2025-05-06 NOTE — TELEPHONE ENCOUNTER
----- Message from Amanda Fried sent at 5/2/2025 12:56 PM CDT -----  GI Staff:    Can you please place recall for this patient to have a colonoscopy in 7 years.    Thank you,  Amanda    ----- Message -----  From: Lab, Background User  Sent: 4/29/2025   4:18 PM CDT  To: Amanda Fried MD

## 2025-05-28 ENCOUNTER — HOSPITAL ENCOUNTER (OUTPATIENT)
Dept: GENERAL RADIOLOGY | Age: 70
Discharge: HOME | End: 2025-05-28
Attending: ORTHOPAEDIC SURGERY
Payer: MEDICARE

## 2025-05-28 ENCOUNTER — OFFICE VISIT (OUTPATIENT)
Dept: ORTHOPEDICS CLINIC | Facility: CLINIC | Age: 70
End: 2025-05-28

## 2025-05-28 DIAGNOSIS — M62.462 CONTRACTURE OF MUSCLE OF LEFT LOWER LEG: ICD-10-CM

## 2025-05-28 DIAGNOSIS — M76.822 INSUFFICIENCY OF LEFT POSTERIOR TIBIAL TENDON: Primary | ICD-10-CM

## 2025-05-28 DIAGNOSIS — M76.822 INSUFFICIENCY OF LEFT POSTERIOR TIBIAL TENDON: ICD-10-CM

## 2025-05-28 PROCEDURE — 1125F AMNT PAIN NOTED PAIN PRSNT: CPT | Performed by: ORTHOPAEDIC SURGERY

## 2025-05-28 PROCEDURE — 1159F MED LIST DOCD IN RCRD: CPT | Performed by: ORTHOPAEDIC SURGERY

## 2025-05-28 PROCEDURE — 99214 OFFICE O/P EST MOD 30 MIN: CPT | Performed by: ORTHOPAEDIC SURGERY

## 2025-05-28 PROCEDURE — 73630 X-RAY EXAM OF FOOT: CPT | Performed by: ORTHOPAEDIC SURGERY

## 2025-05-28 PROCEDURE — 1160F RVW MEDS BY RX/DR IN RCRD: CPT | Performed by: ORTHOPAEDIC SURGERY

## 2025-05-28 NOTE — PROGRESS NOTES
Chief Complaint   Patient presents with    Follow - Up     Follow up Left foot. Patient rates her pain 5/10 at this time.      HA Clemens presents for follow-up of her left ankle after previous surgery. She completed her physical therapy sessions but feels she has not regained her expected range of motion. She is unable to turn she left foot inward as far as her right foot and experiences pain on the side of her left foot, sometimes resulting in twisting. She notes difficulty in proper weight distribution while walking. She reports sharp, needle-like pain at the heel when stepping on it. She uses a shoehorn to put on her shoes due to difficulty with footwear. She has returned to water fitness classes and water volleyball but struggles to perform activities like pirouettes in the water. She desires to return to her previous level of activity.    Physical Exam  Gen: NAD, alert, answering questions appropriately  SheENT: NCAT, EOMI  Lungs: NL breathing, symmetrical lung expansion  Abd: Soft, ND    - Foot & Ankle Inspection/Palpation:    - Left foot appears straight and aligned.    - No erythema, swelling, or deformity.    - Skin intact without lesions.    - Tenderness noted at the heel over the calcaneus.    -minimal TTP to posterior tibial tendon    - Range of Motion:    - Dorsiflexion and plantarflexion are good.    - Eversion is excellent.    - Inversion of the left foot is mildly limited compared to the right.  - Skin:    - Incision sites healed with well-formed scars.  - Neurovascular:    - Sensation intact to light touch.    - DP/PT pulses 2+/4+.    - Capillary refill < 2 seconds.  - Coordination or Gait:    - Ambulates with slightly altered gait due to limited inversion and heel pain.    Imaging seen and reviewed by me:   3v of the left foot s/p calcaneal osteotomy and FDL transfer with tunnel site intact. No evidence of hardware failure/ migration. Well healed calcaneal osteotomy    Assessment/Plan: 70 year old  year old female presenting with  1. Insufficiency of left posterior tibial tendon    - XR FOOT WEIGHTBEARING (3 VIEWS), LEFT (CPT=73630); Future  - Physical Therapy Referral - [facility]  - Physical Therapy Referral - External    2. Contracture of muscle of left lower leg    - Physical Therapy Referral - [facility]  - Physical Therapy Referral - External     Home Exercise Program:  Continue prescribed exercises to enhance flexibility and strength.  Referrals:  Physical therapy referral for left ankle rehabilitation.  Activity / Weight-bearing:  Encourage continuation of water aerobics and low-impact activities as tolerated.  Follow-up:  Return to clinic in 3 months to assess progress and review X-ray findings.    Return in about 3 months (around 8/28/2025).      Beth Arrington DO  05/28/25       30 minutes was spent on this patient on reviewing xrays and prior notes, preforming a physical exam and history and documenting with over 50% of the time spent face to face with the patient

## 2025-06-18 ENCOUNTER — TELEPHONE (OUTPATIENT)
Dept: CASE MANAGEMENT | Age: 70
End: 2025-06-18

## 2025-06-18 ENCOUNTER — TELEPHONE (OUTPATIENT)
Dept: PHYSICAL THERAPY | Age: 70
End: 2025-06-18

## 2025-06-18 DIAGNOSIS — M25.561 RIGHT KNEE PAIN, UNSPECIFIED CHRONICITY: ICD-10-CM

## 2025-06-18 DIAGNOSIS — H26.9 CATARACT OF LEFT EYE, UNSPECIFIED CATARACT TYPE: Primary | ICD-10-CM

## 2025-06-18 NOTE — TELEPHONE ENCOUNTER
Dr. Coker,     Patient requesting referral to Dr. Childs and Dr. López.     Pended referral please review diagnosis and sign off if you agree.    Thank you.  Karma Levin  Managed Care

## 2025-06-26 ENCOUNTER — TELEPHONE (OUTPATIENT)
Dept: INTERNAL MEDICINE CLINIC | Facility: CLINIC | Age: 70
End: 2025-06-26

## 2025-06-26 NOTE — TELEPHONE ENCOUNTER
Patient is due for annual medicare wellness visit for 2025 with PCP or coordinating NP. This can be done anytime in the calendar year.     Spoke to patient and she refused to schedule physical appointment.

## 2025-07-16 ENCOUNTER — TELEPHONE (OUTPATIENT)
Dept: PHYSICAL THERAPY | Facility: HOSPITAL | Age: 70
End: 2025-07-16

## 2025-07-17 ENCOUNTER — TELEPHONE (OUTPATIENT)
Dept: PHYSICAL THERAPY | Age: 70
End: 2025-07-17

## 2025-07-17 ENCOUNTER — TELEPHONE (OUTPATIENT)
Dept: PHYSICAL THERAPY | Facility: HOSPITAL | Age: 70
End: 2025-07-17

## 2025-07-22 ENCOUNTER — OFFICE VISIT (OUTPATIENT)
Dept: PHYSICAL THERAPY | Age: 70
End: 2025-07-22
Attending: ORTHOPAEDIC SURGERY
Payer: MEDICARE

## 2025-07-22 DIAGNOSIS — M76.822 INSUFFICIENCY OF LEFT POSTERIOR TIBIAL TENDON: Primary | ICD-10-CM

## 2025-07-22 DIAGNOSIS — M62.462 CONTRACTURE OF MUSCLE OF LEFT LOWER LEG: ICD-10-CM

## 2025-07-22 PROCEDURE — 97162 PT EVAL MOD COMPLEX 30 MIN: CPT | Performed by: PHYSICAL THERAPIST

## 2025-07-22 NOTE — PROGRESS NOTES
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  Went to immediate care - x rays completed with nothing broken. Tried PT, 4 weeks - without help.  THen had MRI and surgery was done.  Using crutches and really couldn't walk on it.  States had some therapy done after surgery , goes to the pool, tries to strengthen the muscles.  States still has pain at the ankle.  Went to therapy for 8-10 weeks. Had to change PT places due to insurance no longer covering her facility.   Pain level: current 5 /10, at best 3 /10, at worst 7 /10  Description of symptoms: Pain at night with spasms - calf/foot , inside of ankle, inside of foot  Patient reports also has problems with her R knee and needs to have it looked at.  Feels at times it might collapse on stairs.    Occupation:   Retired  Leisure activities/Hobbies: pool - 3 x week, walks dog 4 x daily 1/4 mile to 1/2 mile   Prior level of function:  minimal limitations prior to fall  Current limitations: going up/down stairs- due to R knee pain and L ankle, going up on toes for high shelf, turning/twisting, difficulty carrying objects up stairs.  Pt goals: 1. Strengthening the ankle, get rid of the pain  Past medical history was reviewed with Jayleen.  Significant findings include:  ataxia, essential tremore  Imaging/Tests:     Jayleen  has a past medical history of Bronchitis, Cataract, Colon abnormality, Convulsions (HCC) (04/28/2008), Deep vein thrombosis (HCC), Depression, Essential  tremor, Fall at home (09/17/2015), Hearing impaired person, bilateral, motion sickness, Migraines, Osteoarthritis, Patent foramen ovale (HCC), PFO (patent foramen ovale) ([facility]) (2022), PONV (postoperative nausea and vomiting), Seizure disorder ([facility]), Stroke (HCC), TIA (transient ischemic attack), TIA (transient ischemic attack), Transient cerebral ischemia (08/16/2010), Transient ischemic attack (TIA) (11/29/2022), Tremor, Ulcer, 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  She  has a past surgical history that includes wrist fracture surgery (Right); hysterectomy (01/01/1984); laparoscopy,diagnostic; arthroscopy of joint unlisted (Right); colonoscopy (N/A, 04/29/2025); and colonoscopy (N/A, 4/29/2025).    ASSESSMENT  Jayleen presents to physical therapy evaluation with primary c/o ankle pain. The results of the objective tests and measures show impaired strength L to R ankle,  most noted with PF and inversion,impaired hip strength,  impaired mobility most noted with inversion L to R, impaired balance L > R, antalgic gait pattern with impaired push off, stance time L and c/o pain. Functional deficits include but are not limited to going up/down stairs- due to R knee pain and L ankle, going up on toes for high shelf, turning/twisting, difficulty carrying objects up stairs.. 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 Pt and PT discussed evaluation findings, pathology, POC and HEP.  Pt voiced understanding and performs HEP correctly without reported pain. Skilled Physical Therapy is medically necessary to address the above impairments and reach functional goals.    OBJECTIVE:      Musculoskeletal    Palpation:  tenderness along medial ankle , medial foot        ROM and Strength: (* denotes performed with pain)  hip abd 4/5 bilaterally   Ankle/Foot   ROM MMT (-/5)    R L R L     PF 75 70  5/5  3/5 with pain- non weight bearing.     DF (L4) 15 10 with pain 5/5 4/5 with pain     Inversion 40 15 with pain 5/5 4 minus     Eversion 10 8 5/5 4 minus     Grt Toe Ext (L5) WNL WNL 5/5 5/5       Flexibility:    LE Flexibility R L     Hip Flexor WNL WNL     Hamstrings  Mod restriction  Mod restriction     ITB WNL WNL     Piriformis not tested not tested     Quads min restricted min restricted     Gastroc-soleus min restricted min restricted       Neurological:  Sensation: sometimes middle 2 toes feel numb     Balance and Functional Mobility:  Gait: pt ambulates on level ground with  impaired push off L, decrease stance time L, impaired heel strike   Tandem Stance: R back: 20 sec ; L back:  10 sec  SLS: R 5 sec ,  L  3 sec       Today's Treatment and Response:   Pt education was provided on exam findings, treatment diagnosis, treatment plan, expectations, and prognosis.    Today's Treatment       7/22/2025   LE Treatment   Evaluation Minutes 45   Total Time Of Timed Procedures 0   Total Time Of Service-Based Procedures 45   Total Treatment Time 45        Patient was instructed in and issued a HEP for:  none initiated    Charges:  PT EVAL:  ,    In agreement with evaluation findings and clinical rationale, this evaluation involved MODERATE COMPLEXITY decision making due to 1-2 personal factors/comorbidities, 3 or more body structures involved/activity limitations, and evolving symptoms as documented in the evaluation.                                                                                                                PLAN OF CARE:      Goals: (to be met in 12 visits)   Patient independent with ongoing HEP  Patient to have improved push off and gait pattern with minimal pain noted.  Patient to have increase in strength at ankle all motions with minimal to no pain noted.  Patient to report 50% or greater decrease in difficulty with stairs.   Improvement of PF strength to be able to reach up into cabinets  and do pirouette in the pool .   Patient to report no pain to minimal pain with turning motion at ankle.       Frequency / Duration: Patient will be seen 2x/week or a total of 12  visits over a 90 day period. Treatment will include: Gait training; Manual Therapy; Home Exercise Program instruction; Therapeutic Exercise    Education or treatment limitation: None   Rehab Potential: good     LEFS Score  No data recorded    Patient/Family/Caregiver was advised of these findings, precautions, and treatment options and has agreed to actively participate in planning and for this course of care.    Thank you for your referral. Please co-sign or sign and return this letter via fax as soon as possible to [phone]. If you have any questions, please contact me at Dept: [phone]    Sincerely,  Electronically signed by therapist: Stephanie Rocha PT  Physician's certification required: Yes  I certify the need for these services furnished under this plan of treatment and while under my care.    X___________________________________________________ Date____________________    Certification From: 7/22/2025  To: 10/20/2025

## 2025-07-24 ENCOUNTER — OFFICE VISIT (OUTPATIENT)
Dept: PHYSICAL THERAPY | Age: 70
End: 2025-07-24
Attending: ORTHOPAEDIC SURGERY
Payer: MEDICARE

## 2025-07-24 PROCEDURE — 97140 MANUAL THERAPY 1/> REGIONS: CPT | Performed by: PHYSICAL THERAPIST

## 2025-07-24 PROCEDURE — 97530 THERAPEUTIC ACTIVITIES: CPT | Performed by: PHYSICAL THERAPIST

## 2025-07-24 PROCEDURE — 97110 THERAPEUTIC EXERCISES: CPT | Performed by: PHYSICAL THERAPIST

## 2025-07-24 NOTE — PROGRESS NOTES
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        Pain: 7/10     Objective  see outpatient daily record         Assessment   Revamped HEP as appears patient was overstressing ankle with multiple exercise bouts in a day and exercises that were too stressful for tissues.  Reorganized HEP and pool program with no exercises on Sunday. Will look for this change to reduce irritation at ankle and overall pain.  Patient with swelling medial to gastroc region with pain and tenderness achilles tendon.  Will  look to increase exercise and strengthening as pain lessens.     Goals (to be met in 12 visits)   Patient independent with ongoing HEP  Patient to have improved push off and gait pattern with minimal pain noted.  Patient to have increase in strength at ankle all motions with minimal to no pain noted.  Patient to report 50% or greater decrease in difficulty with stairs.   Improvement of PF strength to be able to reach up into cabinets and do pirouette in the pool .   Patient to report no pain to minimal pain with turning motion at ankle.         Plan  Continue ROM, strengthening , balance , advance as able.Continue with strengthening, ROM work, advance as able. .    Treatment Last 4 Visits  Treatment Day: 2       7/22/2025 7/24/2025   LE Treatment   Therapeutic Exercise  Nu step x 5 minutes level 5  Towel stretch DF x 30 seconds gentle stretch  Isometrics EV with opposite foot x 10, hold 3 sec no pain  Isometric INV with ball bilat x 10, hold 3 sec  PF x 15 with  blue theraband  DF x 15 with RTB  Towel curls x 15  Tenderness to palpation medial to achilles with noted swelling.  STM prone gastroc/achilles  Ice x 10 minutes  During session - assessed resistance bands for INV/EV - pain ful and limited motion  Seated Heel raises - increased pain   Therapeutic Activity  Patient was educated on the reasons for reducing and modifying exercises.  Discussed overstressing tissue can lead to increase pain and inability to advance with strengthening.  Also modified pool program and set new parameters.   Therapeutic Exercise Minutes  30   Manual Therapy Minutes  10   Therapeutic Activity Minutes  15   Evaluation Minutes 45    Total Time Of Timed Procedures 0 55   Total Time Of Service-Based Procedures 45 0   Total Treatment Time 45 55        HEP   Revamped HEP.pool program     Charges   Ex 2, MM, therapeutic activity

## 2025-07-29 ENCOUNTER — OFFICE VISIT (OUTPATIENT)
Dept: PHYSICAL THERAPY | Age: 70
End: 2025-07-29
Attending: ORTHOPAEDIC SURGERY
Payer: MEDICARE

## 2025-07-29 PROCEDURE — 97110 THERAPEUTIC EXERCISES: CPT | Performed by: PHYSICAL THERAPIST

## 2025-07-30 ENCOUNTER — TELEPHONE (OUTPATIENT)
Dept: INTERNAL MEDICINE CLINIC | Facility: CLINIC | Age: 70
End: 2025-07-30

## 2025-07-30 ENCOUNTER — LAB ENCOUNTER (OUTPATIENT)
Dept: LAB | Age: 70
End: 2025-07-30
Attending: INTERNAL MEDICINE

## 2025-07-30 DIAGNOSIS — M25.561 CHRONIC PAIN OF BOTH KNEES: ICD-10-CM

## 2025-07-30 DIAGNOSIS — R25.2 JERKING MOVEMENTS OF EXTREMITIES: ICD-10-CM

## 2025-07-30 DIAGNOSIS — F33.41 RECURRENT MAJOR DEPRESSIVE DISORDER, IN PARTIAL REMISSION: ICD-10-CM

## 2025-07-30 DIAGNOSIS — G89.29 CHRONIC PAIN OF BOTH KNEES: ICD-10-CM

## 2025-07-30 DIAGNOSIS — R27.0 ATAXIA: ICD-10-CM

## 2025-07-30 DIAGNOSIS — G25.2 COARSE TREMORS: ICD-10-CM

## 2025-07-30 DIAGNOSIS — G25.0 ESSENTIAL TREMOR: ICD-10-CM

## 2025-07-30 DIAGNOSIS — Q85.83 VHL (VON HIPPEL-LINDAU SYNDROME) (HCC): Primary | ICD-10-CM

## 2025-07-30 DIAGNOSIS — M25.562 CHRONIC PAIN OF BOTH KNEES: ICD-10-CM

## 2025-07-30 LAB
ALBUMIN SERPL-MCNC: 4.5 G/DL (ref 3.2–4.8)
ALBUMIN/GLOB SERPL: 1.8 (ref 1–2)
ALP LIVER SERPL-CCNC: 71 U/L (ref 55–142)
ALT SERPL-CCNC: 24 U/L (ref 10–49)
ANION GAP SERPL CALC-SCNC: 6 MMOL/L (ref 0–18)
AST SERPL-CCNC: 19 U/L (ref ?–34)
BASOPHILS # BLD AUTO: 0.06 X10(3) UL (ref 0–0.2)
BASOPHILS NFR BLD AUTO: 0.8 %
BILIRUB SERPL-MCNC: 0.3 MG/DL (ref 0.2–1.1)
BUN BLD-MCNC: 18 MG/DL (ref 9–23)
BUN/CREAT SERPL: 18.6 (ref 10–20)
CALCIUM BLD-MCNC: 10 MG/DL (ref 8.7–10.4)
CHLORIDE SERPL-SCNC: 108 MMOL/L (ref 98–112)
CO2 SERPL-SCNC: 29 MMOL/L (ref 21–32)
CREAT BLD-MCNC: 0.97 MG/DL (ref 0.55–1.02)
DEPRECATED RDW RBC AUTO: 45.2 FL (ref 35.1–46.3)
EGFRCR SERPLBLD CKD-EPI 2021: 63 ML/MIN/1.73M2 (ref 60–?)
EOSINOPHIL # BLD AUTO: 0.44 X10(3) UL (ref 0–0.7)
EOSINOPHIL NFR BLD AUTO: 5.9 %
ERYTHROCYTE [DISTWIDTH] IN BLOOD BY AUTOMATED COUNT: 13.6 % (ref 11–15)
FASTING STATUS PATIENT QL REPORTED: NO
GLOBULIN PLAS-MCNC: 2.5 G/DL (ref 2–3.5)
GLUCOSE BLD-MCNC: 105 MG/DL (ref 70–99)
HCT VFR BLD AUTO: 38.9 % (ref 35–48)
HGB BLD-MCNC: 12.2 G/DL (ref 12–16)
IMM GRANULOCYTES # BLD AUTO: 0.01 X10(3) UL (ref 0–1)
IMM GRANULOCYTES NFR BLD: 0.1 %
LYMPHOCYTES # BLD AUTO: 2.43 X10(3) UL (ref 1–4)
LYMPHOCYTES NFR BLD AUTO: 32.8 %
MCH RBC QN AUTO: 28.2 PG (ref 26–34)
MCHC RBC AUTO-ENTMCNC: 31.4 G/DL (ref 31–37)
MCV RBC AUTO: 90 FL (ref 80–100)
MONOCYTES # BLD AUTO: 0.88 X10(3) UL (ref 0.1–1)
MONOCYTES NFR BLD AUTO: 11.9 %
NEUTROPHILS # BLD AUTO: 3.59 X10 (3) UL (ref 1.5–7.7)
NEUTROPHILS # BLD AUTO: 3.59 X10(3) UL (ref 1.5–7.7)
NEUTROPHILS NFR BLD AUTO: 48.5 %
OSMOLALITY SERPL CALC.SUM OF ELEC: 298 MOSM/KG (ref 275–295)
PLATELET # BLD AUTO: 302 10(3)UL (ref 150–450)
POTASSIUM SERPL-SCNC: 5.2 MMOL/L (ref 3.5–5.1)
PROT SERPL-MCNC: 7 G/DL (ref 5.7–8.2)
RBC # BLD AUTO: 4.32 X10(6)UL (ref 3.8–5.3)
SODIUM SERPL-SCNC: 143 MMOL/L (ref 136–145)
WBC # BLD AUTO: 7.4 X10(3) UL (ref 4–11)

## 2025-07-30 PROCEDURE — 36415 COLL VENOUS BLD VENIPUNCTURE: CPT

## 2025-07-30 PROCEDURE — 80053 COMPREHEN METABOLIC PANEL: CPT

## 2025-07-30 PROCEDURE — 85025 COMPLETE CBC W/AUTO DIFF WBC: CPT

## 2025-07-31 ENCOUNTER — OFFICE VISIT (OUTPATIENT)
Dept: PHYSICAL THERAPY | Age: 70
End: 2025-07-31
Attending: ORTHOPAEDIC SURGERY
Payer: MEDICARE

## 2025-07-31 PROCEDURE — 97110 THERAPEUTIC EXERCISES: CPT | Performed by: PHYSICAL THERAPIST

## 2025-08-02 ENCOUNTER — HOSPITAL ENCOUNTER (EMERGENCY)
Facility: HOSPITAL | Age: 70
Discharge: HOME | End: 2025-08-02

## 2025-08-02 VITALS
HEART RATE: 66 BPM | SYSTOLIC BLOOD PRESSURE: 132 MMHG | DIASTOLIC BLOOD PRESSURE: 72 MMHG | TEMPERATURE: 100 F | RESPIRATION RATE: 18 BRPM | OXYGEN SATURATION: 98 %

## 2025-08-02 DIAGNOSIS — S51.852A DOG BITE OF LEFT FOREARM, INITIAL ENCOUNTER: Primary | ICD-10-CM
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
## 2025-08-02 PROCEDURE — 12002 RPR S/N/AX/GEN/TRNK2.6-7.5CM: CPT

## 2025-08-02 PROCEDURE — 90471 IMMUNIZATION ADMIN: CPT

## 2025-08-02 PROCEDURE — 99283 EMERGENCY DEPT VISIT LOW MDM: CPT

## 2025-08-02 RX ORDER — DOXYCYCLINE 100 MG/1
100 CAPSULE ORAL 2 TIMES DAILY
Qty: 6 CAPSULE | Refills: 0 | Status: SHIPPED | OUTPATIENT
Start: 2025-08-02 | End: 2025-08-05

## 2025-08-03 PROBLEM — F33.1 MODERATE EPISODE OF RECURRENT MAJOR DEPRESSIVE DISORDER (HCC): Status: ACTIVE | Noted: 2025-08-03

## 2025-08-03 PROBLEM — G21.8 OTHER SECONDARY PARKINSONISM (HCC): Status: ACTIVE | Noted: 2025-08-03

## 2025-08-07 ENCOUNTER — APPOINTMENT (OUTPATIENT)
Dept: PHYSICAL THERAPY | Age: 70
End: 2025-08-07
Attending: ORTHOPAEDIC SURGERY

## 2025-08-07 ENCOUNTER — TELEPHONE (OUTPATIENT)
Dept: PHYSICAL THERAPY | Age: 70
End: 2025-08-07

## 2025-08-08 ENCOUNTER — TELEPHONE (OUTPATIENT)
Dept: INTERNAL MEDICINE CLINIC | Facility: CLINIC | Age: 70
End: 2025-08-08

## 2025-08-11 ENCOUNTER — OFFICE VISIT (OUTPATIENT)
Dept: PHYSICAL THERAPY | Age: 70
End: 2025-08-11
Attending: ORTHOPAEDIC SURGERY

## 2025-08-11 PROCEDURE — 97014 ELECTRIC STIMULATION THERAPY: CPT | Performed by: PHYSICAL THERAPIST

## 2025-08-11 PROCEDURE — 97110 THERAPEUTIC EXERCISES: CPT | Performed by: PHYSICAL THERAPIST

## 2025-08-12 ENCOUNTER — TELEPHONE (OUTPATIENT)
Dept: ORTHOPEDICS CLINIC | Facility: CLINIC | Age: 70
End: 2025-08-12

## 2025-08-12 DIAGNOSIS — M25.561 RIGHT KNEE PAIN, UNSPECIFIED CHRONICITY: ICD-10-CM

## 2025-08-12 DIAGNOSIS — M25.562 LEFT KNEE PAIN, UNSPECIFIED CHRONICITY: Primary | ICD-10-CM

## 2025-08-13 ENCOUNTER — OFFICE VISIT (OUTPATIENT)
Dept: PHYSICAL THERAPY | Age: 70
End: 2025-08-13
Attending: ORTHOPAEDIC SURGERY

## 2025-08-13 PROCEDURE — 97110 THERAPEUTIC EXERCISES: CPT

## 2025-08-18 ENCOUNTER — HOSPITAL ENCOUNTER (OUTPATIENT)
Dept: GENERAL RADIOLOGY | Age: 70
End: 2025-08-18
Attending: ORTHOPAEDIC SURGERY

## 2025-08-18 ENCOUNTER — TELEPHONE (OUTPATIENT)
Age: 70
End: 2025-08-18

## 2025-08-18 ENCOUNTER — OFFICE VISIT (OUTPATIENT)
Dept: ORTHOPEDICS CLINIC | Facility: CLINIC | Age: 70
End: 2025-08-18

## 2025-08-18 ENCOUNTER — HOSPITAL ENCOUNTER (OUTPATIENT)
Dept: GENERAL RADIOLOGY | Age: 70
Discharge: HOME | End: 2025-08-18
Attending: ORTHOPAEDIC SURGERY

## 2025-08-18 VITALS — BODY MASS INDEX: 34.2 KG/M2 | WEIGHT: 193 LBS | HEIGHT: 63 IN

## 2025-08-18 DIAGNOSIS — M17.11 PRIMARY OSTEOARTHRITIS OF RIGHT KNEE: Primary | ICD-10-CM

## 2025-08-18 DIAGNOSIS — M25.561 RIGHT KNEE PAIN, UNSPECIFIED CHRONICITY: ICD-10-CM

## 2025-08-18 PROCEDURE — 1159F MED LIST DOCD IN RCRD: CPT | Performed by: ORTHOPAEDIC SURGERY

## 2025-08-18 PROCEDURE — 73564 X-RAY EXAM KNEE 4 OR MORE: CPT | Performed by: ORTHOPAEDIC SURGERY

## 2025-08-18 PROCEDURE — 1160F RVW MEDS BY RX/DR IN RCRD: CPT | Performed by: ORTHOPAEDIC SURGERY

## 2025-08-18 PROCEDURE — 3008F BODY MASS INDEX DOCD: CPT | Performed by: ORTHOPAEDIC SURGERY

## 2025-08-18 PROCEDURE — 20610 DRAIN/INJ JOINT/BURSA W/O US: CPT | Performed by: ORTHOPAEDIC SURGERY

## 2025-08-18 PROCEDURE — 99214 OFFICE O/P EST MOD 30 MIN: CPT | Performed by: ORTHOPAEDIC SURGERY

## 2025-08-18 RX ORDER — TRIAMCINOLONE ACETONIDE 40 MG/ML
40 INJECTION, SUSPENSION INTRA-ARTICULAR; INTRAMUSCULAR ONCE
Status: COMPLETED | OUTPATIENT
Start: 2025-08-18 | End: 2025-08-18

## 2025-08-18 RX ADMIN — TRIAMCINOLONE ACETONIDE 40 MG: 40 INJECTION, SUSPENSION INTRA-ARTICULAR; INTRAMUSCULAR at 10:19:00

## 2025-08-19 ENCOUNTER — OFFICE VISIT (OUTPATIENT)
Dept: PHYSICAL THERAPY | Age: 70
End: 2025-08-19
Attending: ORTHOPAEDIC SURGERY

## 2025-08-19 PROCEDURE — 97110 THERAPEUTIC EXERCISES: CPT

## 2025-08-21 ENCOUNTER — OFFICE VISIT (OUTPATIENT)
Dept: PHYSICAL THERAPY | Age: 70
End: 2025-08-21
Attending: ORTHOPAEDIC SURGERY

## 2025-08-21 PROCEDURE — 97110 THERAPEUTIC EXERCISES: CPT | Performed by: PHYSICAL THERAPIST

## 2025-08-26 ENCOUNTER — OFFICE VISIT (OUTPATIENT)
Dept: PHYSICAL THERAPY | Age: 70
End: 2025-08-26
Attending: ORTHOPAEDIC SURGERY

## 2025-08-26 PROCEDURE — 97110 THERAPEUTIC EXERCISES: CPT | Performed by: PHYSICAL THERAPIST

## 2025-08-28 ENCOUNTER — OFFICE VISIT (OUTPATIENT)
Dept: PHYSICAL THERAPY | Age: 70
End: 2025-08-28
Attending: ORTHOPAEDIC SURGERY

## 2025-08-28 PROCEDURE — 97530 THERAPEUTIC ACTIVITIES: CPT | Performed by: PHYSICAL THERAPIST

## 2025-08-28 PROCEDURE — 97110 THERAPEUTIC EXERCISES: CPT | Performed by: PHYSICAL THERAPIST

## (undated) DEVICE — DRESSING FOAM 4.25IN LEN 12.5IN W WND PD N

## (undated) DEVICE — SUT MCRYL 3-0 27IN ABSRB UD L24MM PS-1

## (undated) DEVICE — PACK CDS LOWER EXTREMITY

## (undated) DEVICE — 3M™ TEGADERM™ TRANSPARENT FILM DRESSING FRAME STYLE, 1628, 6 IN X 8 IN (15 CM X 20 CM), 10/CT 8CT/CASE: Brand: 3M™ TEGADERM™

## (undated) DEVICE — 60 ML SYRINGE REGULAR TIP: Brand: MONOJECT

## (undated) DEVICE — TOWEL,OR,DSP,ST,BLUE,DLX,2/PK,40PK/CS: Brand: MEDLINE

## (undated) DEVICE — SUT MCRYL 3-0 18IN PS-2 ABSRB UD 19MM 3/8 CIR

## (undated) DEVICE — ISLAND DRESSING 4IN X 12IN: Brand: SILVERLON ANTIMICROBIAL WOUND DRESSING

## (undated) DEVICE — GOWN,SLEEVE,STERILE,W/CSR WRAP,1/P: Brand: MEDLINE

## (undated) DEVICE — INTENDED FOR TISSUE SEPARATION, AND OTHER PROCEDURES THAT REQUIRE A SHARP SURGICAL BLADE TO PUNCTURE OR CUT.: Brand: BARD-PARKER ® STAINLESS STEEL BLADES

## (undated) DEVICE — PRECISION (9.0 X 0.51 X 31.0MM)

## (undated) DEVICE — SUT COAT VCRL+ 0 27IN CT-1 ABSRB VLT ANTIBACT

## (undated) DEVICE — DRIVER, CANNULATED, T30: Brand: MEDLINE

## (undated) DEVICE — TETRA-FLEX CF WOVEN LATEX FREE ELASTIC BANDAGE 6" X 5.5 YD: Brand: TETRA-FLEX™CF

## (undated) DEVICE — SUT COAT VCRL + 2-0 27IN ABSRB UD ANTIBACT CT-1

## (undated) DEVICE — SPLINT ONESTEP 5X30IN FBRGLS MOLD

## (undated) DEVICE — DISPOSABLE TOURNIQUET CUFF SINGLE BLADDER, DUAL PORT AND QUICK CONNECT CONNECTOR: Brand: COLOR CUFF

## (undated) DEVICE — KIT CLEAN ENDOKIT 1.1OZ GOWNX2

## (undated) DEVICE — SPONGE 4X4 10PK

## (undated) DEVICE — GUIDEPIN, NON-THREADED, 2.5 X 200MM: Brand: MEDLINEUNITE

## (undated) DEVICE — V2 SPECIMEN COLLECTION MANIFOLD KIT: Brand: NEPTUNE

## (undated) DEVICE — OCCLUSIVE GAUZE STRIP,3% BISMUTH TRIBROMOPHENATE IN PETROLATUM BLEND: Brand: XEROFORM

## (undated) DEVICE — WEBRIL COTTON UNDERCAST PADDING: Brand: WEBRIL

## (undated) DEVICE — Device

## (undated) DEVICE — APPLICATOR PREP 26ML CHG 2% ISO ALC 70%

## (undated) DEVICE — DRILL BIT, CANNULATED, LONG, 4.5MM: Brand: MEDLINE

## (undated) DEVICE — C-ARM: Brand: UNBRANDED

## (undated) DEVICE — COTTON UNDERCAST PADDING,REGULAR FINISH: Brand: WEBRIL

## (undated) DEVICE — KIT ENDO ORCAPOD 160/180/190

## (undated) DEVICE — TETRA-FLEX CF WOVEN LATEX FREE ELASTIC BANDAGE 4" X 5.5 YD: Brand: TETRA-FLEX™CF

## (undated) DEVICE — SCREW, CANN, HEADLESS, 6.5 X 50 X 32MM
Type: IMPLANTABLE DEVICE | Site: FOOT | Status: NON-FUNCTIONAL
Brand: MEDLINE

## (undated) DEVICE — LASSO POLYPECTOMY SNARE: Brand: LASSO

## (undated) DEVICE — PAD,ABDOMINAL,8"X7.5",STERILE,LF,1/PK: Brand: MEDLINE

## (undated) DEVICE — SUT ETHBND XL 0 30IN NABSRB GRN 26MM SH 1/2 CIR TAPR

## (undated) DEVICE — GAMMEX® NON-LATEX PI ORTHO SIZE 6.5, STERILE POLYISOPRENE POWDER-FREE SURGICAL GLOVE: Brand: GAMMEX

## (undated) DEVICE — SOLUTION IRRIG 1000ML 0.9% NACL USP BTL

## (undated) DEVICE — GAMMEX® PI HYBRID SIZE 6.5, STERILE POWDER-FREE SURGICAL GLOVE, POLYISOPRENE AND NEOPRENE BLEND: Brand: GAMMEX

## (undated) NOTE — Clinical Note
5/6/2025    Jayleen Gillette        [address] [place] [address]            Dear Jayleen Gillette,      Our records indicate that you are due for an appointment for a {ELM Gastro Procedure:6484} on or about *** with {EM GASTRO DOCTORS:6485}.    Please call our office to schedule this appointment.  Your medical well-being is important to us.    If your insurance requires a referral, please call our primary care office to request one.      Thank you,      The Physicians and Staff at [facility]

## (undated) NOTE — LETTER
AUTHORIZATION FOR SURGICAL OPERATION OR OTHER PROCEDURE    1. I hereby authorize RC Rodriguez, and [address] [place] [address] staff assigned to my case to perform the following operation and/or procedure at the [address] [place] [address]:    ___________________________________Cortisone Injection Right knee___________      _______________________________________________________________________________________________    2. My physician has explained the nature and purpose of the operation or other procedure, possible alternative methods of treatment, the risks involved, and the possibility of complication to me. I acknowledge that no guarantee has been made as to the result that may be obtained. 3.  I recognize that, during the course of this operation, or other procedure, unforseen conditions may necessitate additional or different procedure than those listed above. I, therefore, further authorize and request that the above named physician, his/her physician assistants or designees perform such procedures as are, in his/her professional opinion, necessary and desirable. 4.  Any tissue or organs removed in the operation or other procedure may be disposed of by and at the discretion of the [address] [place] [address] and [facility]. 5.  I understand that in the event of a medical emergency, I will be transported by local paramedics to [facility] or other hospital emergency department. 6.  I certify that I have read and fully understand the above consent to operation and/or other procedure. 7.  I acknowledge that my physician has explained sedation/analgesia administration to me including the risks and benefits. I consent to the administration of sedation/analgesia as may be necessary or desirable in the judgement of my physician. Witness signature: ___________________________________________________ Date:  ______/______/_____                    Time:  ________ A. M.  P.M.        Patient Name: ______________________________________________________  (please print)      Patient signature:  ___________________________________________________             Relationship to Patient:           []  Parent    Responsible person                          []  Spouse  In case of minor or                    [] Other  _____________   Incompetent name:  __________________________________________________                               (please print)      _____________      Responsible person  In case of minor or  Incompetent signature:  _______________________________________________    Statement of Physician  My signature below affirms that prior to the time of the procedure, I have explained to the patient and/or his/her guardian, the risks and benefits involved in the proposed treatment and any reasonable alternative to the proposed treatment. I have also explained the risks and benefits involved in the refusal of the proposed treatment and have answered the patient's questions.                         Date:  ______/______/_______  Provider                      Signature:  __________________________________________________________       Time:  ___________ A.M    P.M.

## (undated) NOTE — Clinical Note
Ortho Sx Request Surgery: Left foot posterior tibial tendon tenolysis, medial displacement calcaneal osteotomy, peroneal brevis to longus transfer, secondary Achilles tendon repair, spring ligament secondary reconstruction Surgical Assist: yes Sx Day Request: Per pt.  Estimated Procedure Length: 3 hours Anesthesia type: General + popliteal/saphenous Block  Position: lateral beanbag  Special Needs:[x]Mini C-Arm   Equipment: Medline 6.5 headless screws,  Ashley Artelon, tenodesis screw Location:Main OR Post Op Visit Date: 1-2 Week in [place] CPT Code: 43627,64225, 17038, 89569,88255  ICD Code: Medical Clearance Needed: Medical Preadmission Testing Orders: Anesthesia testing protocols and anesthesia pre op orders will be used Additional PAT orders: Pre OP Orders: Use the prophylactic antibiotic protocol Additional Pre Op Orders: PCN Allergy: Yes If Yes:  Proceed with PCN/Cephalosporin recommend antibiotic as benefit outweighs risk Admit: Hospital outpatient surgery Home Health No

## (undated) NOTE — LETTER
[facility]  [address], [place], [place]    Authorization for Surgical Operation and Procedure                               I hereby authorize Amanda Fried MD, my physician and his/her assistants (if applicable), which may include medical students, residents, and/or fellows, to perform the following surgical operation/ procedure and administer such anesthesia as may be determined necessary by my physician: Operation/Procedure name (s) COLONOSCOPY on Jayleen Muñozgianniemily   2.   I recognize that during the surgical operation/procedure, unforeseen conditions may necessitate additional or different procedures than those listed above.  I, therefore, further authorize and request that the above-named surgeon, assistants, or designees perform such procedures as are, in their judgment, necessary and desirable.    3.   My surgeon/physician has discussed prior to my surgery the potential benefits, risks and side effects of this procedure; the likelihood of achieving goals; and potential problems that might occur during recuperation.  They also discussed reasonable alternatives to the procedure, including risks, benefits, and side effects related to the alternatives and risks related to not receiving this procedure.  I have had all my questions answered and I acknowledge that no guarantee has been made as to the result that may be obtained.    4.   Should the need arise during my operation/procedure, which includes change of level of care prior to discharge, I also consent to the administration of blood and/or blood products.  Further, I understand that despite careful testing and screening of blood or blood products by collecting agencies, I may still be subject to ill effects as a result of receiving a blood transfusion and/or blood products.  The following are some, but not all, of the potential risks that can occur: fever and allergic reactions, hemolytic reactions, transmission of  diseases such as Hepatitis, AIDS and Cytomegalovirus (CMV) and fluid overload.  In the event that I wish to have an autologous transfusion of my own blood, or a directed donor transfusion, I will discuss this with my physician.  Check only if Refusing Blood or Blood Products  I understand refusal of blood or blood products as deemed necessary by my physician may have serious consequences to my condition to include possible death. I hereby assume responsibility for my refusal and release the hospital, its personnel, and my physicians from any responsibility for the consequences of my refusal.    o  Refuse   5.   I authorize the use of any specimen, organs, tissues, body parts or foreign objects that may be removed from my body during the operation/procedure for diagnosis, research or teaching purposes and their subsequent disposal by hospital authorities.  I also authorize the release of specimen test results and/or written reports to my treating physician on the hospital medical staff or other referring or consulting physicians involved in my care, at the discretion of the Pathologist or my treating physician.    6.   I consent to the photographing or videotaping of the operations or procedures to be performed, including appropriate portions of my body for medical, scientific, or educational purposes, provided my identity is not revealed by the pictures or by descriptive texts accompanying them.  If the procedure has been photographed/videotaped, the surgeon will obtain the original picture, image, videotape or CD.  The hospital will not be responsible for storage, release or maintenance of the picture, image, tape or CD.    7.   I consent to the presence of a  or observers in the operating room as deemed necessary by my physician or their designees.    8.   I recognize that in the event my procedure results in extended X-Ray/fluoroscopy time, I may develop a skin reaction.    9. If I have a Do Not  Attempt Resuscitation (DNAR) order in place, that status will be suspended while in the operating room, procedural suite, and during the recovery period unless otherwise explicitly stated by me (or a person authorized to consent on my behalf). The surgeon or my attending physician will determine when the applicable recovery period ends for purposes of reinstating the DNAR order.  10. Patients having a sterilization procedure: I understand that if the procedure is successful the results will be permanent and it will therefore be impossible for me to inseminate, conceive, or bear children.  I also understand that the procedure is intended to result in sterility, although the result has not been guaranteed.   11. I acknowledge that my physician has explained sedation/analgesia administration to me including the risk and benefits I consent to the administration of sedation/analgesia as may be necessary or desirable in the judgment of my physician.    I CERTIFY THAT I HAVE READ AND FULLY UNDERSTAND THE ABOVE CONSENT TO OPERATION and/or OTHER PROCEDURE.     ____________________________________  _________________________________        ______________________________  Signature of Patient    Signature of Responsible Person                Printed Name of Responsible Person                                      ____________________________________  _____________________________                ________________________________  Signature of Witness        Date  Time         Relationship to Patient    STATEMENT OF PHYSICIAN My signature below affirms that prior to the time of the procedure; I have explained to the patient and/or his/her legal representative, the risks and benefits involved in the proposed treatment and any reasonable alternative to the proposed treatment. I have also explained the risks and benefits involved in refusal of the proposed treatment and alternatives to the proposed treatment and have answered the  patient's questions. If I have a significant financial interest in a co-management agreement or a significant financial interest in any product or implant, or other significant relationship used in this procedure/surgery, I have disclosed this and had a discussion with my patient.     _____________________________________________________              _____________________________  (Signature of Physician)                                                                                         (Date)                                   (Time)  Patient Name: Jayleen Gillette      : 1955      Printed: 2025     Medical Record #: [id number]                                      Page 1 of 1

## (undated) NOTE — LETTER
[facility]  Administration of Anesthesia  I Jayleen Gillette agree to be cared for by a physician anesthesiologist alone and/or with a nurse anesthetist, who is specially trained to monitor me and give me medicine to put me to sleep or keep me comfortable during my procedure    I understand that my anesthesiologist and/or anesthetist is not an employee or agent of [facility] or [employer]. He or she works for [facility]    As the patient asking for anesthesia services, I agree to:  Allow the anesthesiologist (anesthesia doctor) to give me medicine and do additional procedures as necessary. Some examples are: Starting or using an “IV” to give me medicine, fluids or blood during my procedure, and having a breathing tube placed to help me breathe when I’m asleep (intubation). In the event that my heart stops working properly, I understand that my anesthesiologist will make every effort to sustain my life, unless otherwise directed by [facility] Do Not Resuscitate documents.  Tell my anesthesia doctor before my procedure:  If I am pregnant.  The last time that I ate or drank.  iii. All of the medicines I take (including prescriptions, herbal supplements, and pills I can buy without a prescription (including street drugs/illegal medications). Failure to inform my anesthesiologist about these medicines may increase my risk of anesthetic complications.  iv.If I am allergic to anything or have had a reaction to anesthesia before.  I understand how the anesthesia medicine will help me (benefits).  I understand that with any type of anesthesia medicine there are risks:  The most common risks are: nausea, vomiting, sore throat, muscle soreness, damage to my eyes, mouth, or teeth (from breathing tube placement).  Rare risks include: remembering what happened during my procedure, allergic reactions to medications, injury to my airway, heart, lungs, vision, nerves, or  muscles and in extremely rare instances death.  My doctor has explained to me other choices available to me for my care (alternatives).  Pregnant Patients (“epidural”):  I understand that the risks of having an epidural (medicine given into my back to help control pain during labor), include itching, low blood pressure, difficulty urinating, headache or slowing of the baby’s heart. Very rare risks include infection, bleeding, seizure, irregular heart rhythms and nerve injury.  Regional Anesthesia (“spinal”, “epidural”, & “nerve blocks”):  I understand that rare but potential complications include headache, bleeding, infection, seizure, irregular heart rhythms, and nerve injury.    _____________________________________________________________________________  Patient (or Representative) Signature/Relationship to Patient  Date   Time    _____________________________________________________________________________   Name (if used)    Language/Organization   Time    _____________________________________________________________________________  Nurse Anesthetist Signature     Date   Time  _____________________________________________________________________________  Anesthesiologist Signature     Date   Time  I have discussed the procedure and information above with the patient (or patient’s representative) and answered their questions. The patient or their representative has agreed to have anesthesia services.    _____________________________________________________________________________  Witness        Date   Time  I have verified that the signature is that of the patient or patient’s representative, and that it was signed before the procedure  Patient Name: Jayleen Gillette     : 1955                 Printed: 2025 at 8:51 AM    Medical Record #: [id number]                                            Page 1 of 1  ----------ANESTHESIA CONSENT----------

## (undated) NOTE — LETTER
5/6/2025              Jayleen Gillette        [address] [place] [address]         Dear Jayleen,    Your biopsy results from your recent Colonoscopy are benign, NO Cancer. Dr. Morrell recommends that you repeat your Colonoscopy in 7 years. Our office will reach out to you around February 2032 to begin the process to schedule your colonoscopy. There is no need for further testing at this time.  I look forward to seeing you at your next scheduled appointment.      Sincerely,    Amanda Fried MD  [facility] [address] [facility] [address]  [facility] [address]  Ph: [phone]  Fax: [phone]

## (undated) NOTE — LETTER
[facility]  Administration of Anesthesia  I Jayleen Gillette agree to be cared for by a physician anesthesiologist alone and/or with a nurse anesthetist, who is specially trained to monitor me and give me medicine to put me to sleep or keep me comfortable during my procedure    I understand that my anesthesiologist and/or anesthetist is not an employee or agent of [facility] or [employer]. He or she works for [facility]    As the patient asking for anesthesia services, I agree to:  Allow the anesthesiologist (anesthesia doctor) to give me medicine and do additional procedures as necessary. Some examples are: Starting or using an “IV” to give me medicine, fluids or blood during my procedure, and having a breathing tube placed to help me breathe when I’m asleep (intubation). In the event that my heart stops working properly, I understand that my anesthesiologist will make every effort to sustain my life, unless otherwise directed by [facility] Do Not Resuscitate documents.  Tell my anesthesia doctor before my procedure:  If I am pregnant.  The last time that I ate or drank.  iii. All of the medicines I take (including prescriptions, herbal supplements, and pills I can buy without a prescription (including street drugs/illegal medications). Failure to inform my anesthesiologist about these medicines may increase my risk of anesthetic complications.  iv.If I am allergic to anything or have had a reaction to anesthesia before.  I understand how the anesthesia medicine will help me (benefits).  I understand that with any type of anesthesia medicine there are risks:  The most common risks are: nausea, vomiting, sore throat, muscle soreness, damage to my eyes, mouth, or teeth (from breathing tube placement).  Rare risks include: remembering what happened during my procedure, allergic reactions to medications, injury to my airway, heart, lungs, vision, nerves, or  muscles and in extremely rare instances death.  My doctor has explained to me other choices available to me for my care (alternatives).  Pregnant Patients (“epidural”):  I understand that the risks of having an epidural (medicine given into my back to help control pain during labor), include itching, low blood pressure, difficulty urinating, headache or slowing of the baby’s heart. Very rare risks include infection, bleeding, seizure, irregular heart rhythms and nerve injury.  Regional Anesthesia (“spinal”, “epidural”, & “nerve blocks”):  I understand that rare but potential complications include headache, bleeding, infection, seizure, irregular heart rhythms, and nerve injury.    _____________________________________________________________________________  Patient (or Representative) Signature/Relationship to Patient  Date   Time    _____________________________________________________________________________   Name (if used)    Language/Organization   Time    _____________________________________________________________________________  Nurse Anesthetist Signature     Date   Time  _____________________________________________________________________________  Anesthesiologist Signature     Date   Time  I have discussed the procedure and information above with the patient (or patient’s representative) and answered their questions. The patient or their representative has agreed to have anesthesia services.    _____________________________________________________________________________  Witness        Date   Time  I have verified that the signature is that of the patient or patient’s representative, and that it was signed before the procedure  Patient Name: Jayleen Gillette     : 1955                 Printed: 2025 at 9:24 AM    Medical Record #: [id number]                                            Page 1 of 1  ----------ANESTHESIA CONSENT----------

## (undated) NOTE — LETTER
AUTHORIZATION FOR SURGICAL OPERATION OR OTHER PROCEDURE    1.  I hereby authorize Dr. Damaso Lawler and the [facility] staff assigned to my case to perform the following operation and/or procedure at the [facility]:    _____occipital nerve block  _______________ P.M.       Patient Name:  _Dawn Loresch_____________________________________________________  (please print)       Patient signature:  ___________________________________________________             Relationship to Patient:           []  Parent    Responsi

## (undated) NOTE — LETTER
[facility]  [address], [place], [place]    Authorization for Surgical Operation and Procedure                               I hereby authorize Amanda Fried MD, my physician and his/her assistants (if applicable), which may include medical students, residents, and/or fellows, to perform the following surgical operation/ procedure and administer such anesthesia as may be determined necessary by my physician: Operation/Procedure name (s) COLONOSCOPY on Jayleen Muñozgianniemily   2.   I recognize that during the surgical operation/procedure, unforeseen conditions may necessitate additional or different procedures than those listed above.  I, therefore, further authorize and request that the above-named surgeon, assistants, or designees perform such procedures as are, in their judgment, necessary and desirable.    3.   My surgeon/physician has discussed prior to my surgery the potential benefits, risks and side effects of this procedure; the likelihood of achieving goals; and potential problems that might occur during recuperation.  They also discussed reasonable alternatives to the procedure, including risks, benefits, and side effects related to the alternatives and risks related to not receiving this procedure.  I have had all my questions answered and I acknowledge that no guarantee has been made as to the result that may be obtained.    4.   Should the need arise during my operation/procedure, which includes change of level of care prior to discharge, I also consent to the administration of blood and/or blood products.  Further, I understand that despite careful testing and screening of blood or blood products by collecting agencies, I may still be subject to ill effects as a result of receiving a blood transfusion and/or blood products.  The following are some, but not all, of the potential risks that can occur: fever and allergic reactions, hemolytic reactions, transmission of  diseases such as Hepatitis, AIDS and Cytomegalovirus (CMV) and fluid overload.  In the event that I wish to have an autologous transfusion of my own blood, or a directed donor transfusion, I will discuss this with my physician.  Check only if Refusing Blood or Blood Products  I understand refusal of blood or blood products as deemed necessary by my physician may have serious consequences to my condition to include possible death. I hereby assume responsibility for my refusal and release the hospital, its personnel, and my physicians from any responsibility for the consequences of my refusal.    o  Refuse   5.   I authorize the use of any specimen, organs, tissues, body parts or foreign objects that may be removed from my body during the operation/procedure for diagnosis, research or teaching purposes and their subsequent disposal by hospital authorities.  I also authorize the release of specimen test results and/or written reports to my treating physician on the hospital medical staff or other referring or consulting physicians involved in my care, at the discretion of the Pathologist or my treating physician.    6.   I consent to the photographing or videotaping of the operations or procedures to be performed, including appropriate portions of my body for medical, scientific, or educational purposes, provided my identity is not revealed by the pictures or by descriptive texts accompanying them.  If the procedure has been photographed/videotaped, the surgeon will obtain the original picture, image, videotape or CD.  The hospital will not be responsible for storage, release or maintenance of the picture, image, tape or CD.    7.   I consent to the presence of a  or observers in the operating room as deemed necessary by my physician or their designees.    8.   I recognize that in the event my procedure results in extended X-Ray/fluoroscopy time, I may develop a skin reaction.    9. If I have a Do Not  Attempt Resuscitation (DNAR) order in place, that status will be suspended while in the operating room, procedural suite, and during the recovery period unless otherwise explicitly stated by me (or a person authorized to consent on my behalf). The surgeon or my attending physician will determine when the applicable recovery period ends for purposes of reinstating the DNAR order.  10. Patients having a sterilization procedure: I understand that if the procedure is successful the results will be permanent and it will therefore be impossible for me to inseminate, conceive, or bear children.  I also understand that the procedure is intended to result in sterility, although the result has not been guaranteed.   11. I acknowledge that my physician has explained sedation/analgesia administration to me including the risk and benefits I consent to the administration of sedation/analgesia as may be necessary or desirable in the judgment of my physician.    I CERTIFY THAT I HAVE READ AND FULLY UNDERSTAND THE ABOVE CONSENT TO OPERATION and/or OTHER PROCEDURE.     ____________________________________  _________________________________        ______________________________  Signature of Patient    Signature of Responsible Person                Printed Name of Responsible Person                                      ____________________________________  _____________________________                ________________________________  Signature of Witness        Date  Time         Relationship to Patient    STATEMENT OF PHYSICIAN My signature below affirms that prior to the time of the procedure; I have explained to the patient and/or his/her legal representative, the risks and benefits involved in the proposed treatment and any reasonable alternative to the proposed treatment. I have also explained the risks and benefits involved in refusal of the proposed treatment and alternatives to the proposed treatment and have answered the  patient's questions. If I have a significant financial interest in a co-management agreement or a significant financial interest in any product or implant, or other significant relationship used in this procedure/surgery, I have disclosed this and had a discussion with my patient.     _____________________________________________________              _____________________________  (Signature of Physician)                                                                                         (Date)                                   (Time)  Patient Name: Jayleen Gillette      : 1955      Printed: 2025     Medical Record #: [id number]                                      Page 1 of 1

## (undated) NOTE — Clinical Note
5/6/2025    Jayleen Gillette        [address] [place] [address]            Dear Jayleen Gillette,      Our records indicate that you are due for an appointment for a {ELM Gastro Procedure:6484} on or about *** with {EM GASTRO DOCTORS:6485}.    Please call our office to schedule this appointment.  Your medical well-being is important to us.    If your insurance requires a referral, please call our primary care office to request one.      Thank you,      The Physicians and Staff at [facility]

## (undated) NOTE — Clinical Note
5/6/2025    Jayleen Gillette        [address] [place] [address]            Dear Jayleen Gillette,      Our records indicate that you are due for an appointment for a {ELM Gastro Procedure:6484} on or about *** with {EM GASTRO DOCTORS:6485}.    Please call our office to schedule this appointment.  Your medical well-being is important to us.    If your insurance requires a referral, please call our primary care office to request one.      Thank you,      The Physicians and Staff at [facility]